# Patient Record
Sex: MALE | Race: WHITE | NOT HISPANIC OR LATINO | Employment: OTHER | URBAN - NONMETROPOLITAN AREA
[De-identification: names, ages, dates, MRNs, and addresses within clinical notes are randomized per-mention and may not be internally consistent; named-entity substitution may affect disease eponyms.]

---

## 2020-09-28 ENCOUNTER — HOSPITAL ENCOUNTER (EMERGENCY)
Facility: OTHER | Age: 70
Discharge: HOME | End: 2020-09-28
Attending: PHYSICIAN ASSISTANT | Admitting: PHYSICIAN ASSISTANT
Payer: COMMERCIAL

## 2020-09-28 ENCOUNTER — APPOINTMENT (OUTPATIENT)
Dept: GENERAL RADIOLOGY | Facility: OTHER | Age: 70
End: 2020-09-28
Attending: PHYSICIAN ASSISTANT
Payer: COMMERCIAL

## 2020-09-28 VITALS
DIASTOLIC BLOOD PRESSURE: 83 MMHG | HEART RATE: 72 BPM | RESPIRATION RATE: 15 BRPM | TEMPERATURE: 98.5 F | BODY MASS INDEX: 21.36 KG/M2 | SYSTOLIC BLOOD PRESSURE: 126 MMHG | WEIGHT: 175.4 LBS | HEIGHT: 76 IN | OXYGEN SATURATION: 97 %

## 2020-09-28 DIAGNOSIS — K92.1 MELENA: ICD-10-CM

## 2020-09-28 DIAGNOSIS — N39.0 URINARY TRACT INFECTION WITH PYURIA: ICD-10-CM

## 2020-09-28 DIAGNOSIS — K64.4 RESIDUAL HEMORRHOIDAL SKIN TAGS: ICD-10-CM

## 2020-09-28 DIAGNOSIS — K61.1 PERIRECTAL ABSCESS: ICD-10-CM

## 2020-09-28 LAB
ABO + RH BLD: NORMAL
ABO + RH BLD: NORMAL
ALBUMIN SERPL-MCNC: 4.3 G/DL (ref 3.5–5.7)
ALBUMIN UR-MCNC: 10 MG/DL
ALP SERPL-CCNC: 61 U/L (ref 34–104)
ALT SERPL W P-5'-P-CCNC: 10 U/L (ref 7–52)
ANION GAP SERPL CALCULATED.3IONS-SCNC: 8 MMOL/L (ref 3–14)
APPEARANCE UR: ABNORMAL
AST SERPL W P-5'-P-CCNC: 17 U/L (ref 13–39)
BASOPHILS # BLD AUTO: 0.1 10E9/L (ref 0–0.2)
BASOPHILS NFR BLD AUTO: 0.7 %
BILIRUB SERPL-MCNC: 0.7 MG/DL (ref 0.3–1)
BILIRUB UR QL STRIP: NEGATIVE
BLD GP AB SCN SERPL QL: NORMAL
BLOOD BANK CMNT PATIENT-IMP: NORMAL
BUN SERPL-MCNC: 19 MG/DL (ref 7–25)
CALCIUM SERPL-MCNC: 10.3 MG/DL (ref 8.6–10.3)
CHLORIDE SERPL-SCNC: 100 MMOL/L (ref 98–107)
CO2 SERPL-SCNC: 27 MMOL/L (ref 21–31)
COLOR UR AUTO: YELLOW
CREAT SERPL-MCNC: 0.99 MG/DL (ref 0.7–1.3)
DIFFERENTIAL METHOD BLD: NORMAL
EOSINOPHIL # BLD AUTO: 0.2 10E9/L (ref 0–0.7)
EOSINOPHIL NFR BLD AUTO: 2.7 %
ERYTHROCYTE [DISTWIDTH] IN BLOOD BY AUTOMATED COUNT: 12.2 % (ref 10–15)
GFR SERPL CREATININE-BSD FRML MDRD: 75 ML/MIN/{1.73_M2}
GLUCOSE SERPL-MCNC: 111 MG/DL (ref 70–105)
GLUCOSE UR STRIP-MCNC: NEGATIVE MG/DL
HCT VFR BLD AUTO: 46.8 % (ref 40–53)
HEMOCCULT STL QL: NEGATIVE
HGB BLD-MCNC: 15.9 G/DL (ref 13.3–17.7)
HGB UR QL STRIP: ABNORMAL
IMM GRANULOCYTES # BLD: 0 10E9/L (ref 0–0.4)
IMM GRANULOCYTES NFR BLD: 0.3 %
INR PPP: 1 (ref 0–1.3)
INTERPRETATION ECG - MUSE: NORMAL
KETONES UR STRIP-MCNC: NEGATIVE MG/DL
LEUKOCYTE ESTERASE UR QL STRIP: ABNORMAL
LYMPHOCYTES # BLD AUTO: 2.1 10E9/L (ref 0.8–5.3)
LYMPHOCYTES NFR BLD AUTO: 29 %
MCH RBC QN AUTO: 31.2 PG (ref 26.5–33)
MCHC RBC AUTO-ENTMCNC: 34 G/DL (ref 31.5–36.5)
MCV RBC AUTO: 92 FL (ref 78–100)
MONOCYTES # BLD AUTO: 0.6 10E9/L (ref 0–1.3)
MONOCYTES NFR BLD AUTO: 8.3 %
MUCOUS THREADS #/AREA URNS LPF: PRESENT /LPF
NEUTROPHILS # BLD AUTO: 4.2 10E9/L (ref 1.6–8.3)
NEUTROPHILS NFR BLD AUTO: 59 %
NITRATE UR QL: NEGATIVE
PH UR STRIP: 6.5 PH (ref 5–7)
PLATELET # BLD AUTO: 230 10E9/L (ref 150–450)
POTASSIUM SERPL-SCNC: 3.9 MMOL/L (ref 3.5–5.1)
PROT SERPL-MCNC: 7.6 G/DL (ref 6.4–8.9)
RBC # BLD AUTO: 5.09 10E12/L (ref 4.4–5.9)
RBC #/AREA URNS AUTO: 33 /HPF (ref 0–2)
SODIUM SERPL-SCNC: 135 MMOL/L (ref 134–144)
SOURCE: ABNORMAL
SP GR UR STRIP: 1.01 (ref 1–1.03)
SPECIMEN EXP DATE BLD: NORMAL
UROBILINOGEN UR STRIP-MCNC: NORMAL MG/DL (ref 0–2)
WBC # BLD AUTO: 7.1 10E9/L (ref 4–11)
WBC #/AREA URNS AUTO: >182 /HPF (ref 0–5)
WBC CLUMPS #/AREA URNS HPF: PRESENT /HPF
YEAST #/AREA URNS HPF: ABNORMAL /HPF

## 2020-09-28 PROCEDURE — 36415 COLL VENOUS BLD VENIPUNCTURE: CPT | Performed by: PHYSICIAN ASSISTANT

## 2020-09-28 PROCEDURE — 25000128 H RX IP 250 OP 636: Performed by: PHYSICIAN ASSISTANT

## 2020-09-28 PROCEDURE — 86850 RBC ANTIBODY SCREEN: CPT | Performed by: PHYSICIAN ASSISTANT

## 2020-09-28 PROCEDURE — 80053 COMPREHEN METABOLIC PANEL: CPT | Performed by: PHYSICIAN ASSISTANT

## 2020-09-28 PROCEDURE — 81001 URINALYSIS AUTO W/SCOPE: CPT | Performed by: PHYSICIAN ASSISTANT

## 2020-09-28 PROCEDURE — 25000132 ZZH RX MED GY IP 250 OP 250 PS 637: Performed by: PHYSICIAN ASSISTANT

## 2020-09-28 PROCEDURE — 99283 EMERGENCY DEPT VISIT LOW MDM: CPT | Mod: Z6 | Performed by: PHYSICIAN ASSISTANT

## 2020-09-28 PROCEDURE — 85610 PROTHROMBIN TIME: CPT | Performed by: PHYSICIAN ASSISTANT

## 2020-09-28 PROCEDURE — 93005 ELECTROCARDIOGRAM TRACING: CPT | Performed by: PHYSICIAN ASSISTANT

## 2020-09-28 PROCEDURE — 93010 ELECTROCARDIOGRAM REPORT: CPT | Performed by: INTERNAL MEDICINE

## 2020-09-28 PROCEDURE — 86901 BLOOD TYPING SEROLOGIC RH(D): CPT | Performed by: PHYSICIAN ASSISTANT

## 2020-09-28 PROCEDURE — 25800030 ZZH RX IP 258 OP 636: Performed by: PHYSICIAN ASSISTANT

## 2020-09-28 PROCEDURE — 85025 COMPLETE CBC W/AUTO DIFF WBC: CPT | Performed by: PHYSICIAN ASSISTANT

## 2020-09-28 PROCEDURE — 74019 RADEX ABDOMEN 2 VIEWS: CPT

## 2020-09-28 PROCEDURE — 99285 EMERGENCY DEPT VISIT HI MDM: CPT | Mod: 25 | Performed by: PHYSICIAN ASSISTANT

## 2020-09-28 PROCEDURE — 87086 URINE CULTURE/COLONY COUNT: CPT | Performed by: PHYSICIAN ASSISTANT

## 2020-09-28 PROCEDURE — C9113 INJ PANTOPRAZOLE SODIUM, VIA: HCPCS | Performed by: PHYSICIAN ASSISTANT

## 2020-09-28 PROCEDURE — 82272 OCCULT BLD FECES 1-3 TESTS: CPT | Performed by: PHYSICIAN ASSISTANT

## 2020-09-28 PROCEDURE — 96365 THER/PROPH/DIAG IV INF INIT: CPT | Performed by: PHYSICIAN ASSISTANT

## 2020-09-28 PROCEDURE — 86900 BLOOD TYPING SEROLOGIC ABO: CPT | Performed by: PHYSICIAN ASSISTANT

## 2020-09-28 RX ORDER — TAMSULOSIN HYDROCHLORIDE 0.4 MG/1
0.4 CAPSULE ORAL DAILY
COMMUNITY
End: 2021-12-14

## 2020-09-28 RX ORDER — FLUCONAZOLE 150 MG/1
150 TABLET ORAL ONCE
Status: COMPLETED | OUTPATIENT
Start: 2020-09-28 | End: 2020-09-28

## 2020-09-28 RX ORDER — FERROUS SULFATE 325(65) MG
325 TABLET ORAL
COMMUNITY
End: 2020-11-05

## 2020-09-28 RX ORDER — SULFAMETHOXAZOLE/TRIMETHOPRIM 800-160 MG
1 TABLET ORAL 2 TIMES DAILY
Qty: 20 TABLET | Refills: 0 | Status: SHIPPED | OUTPATIENT
Start: 2020-09-28 | End: 2020-10-08

## 2020-09-28 RX ORDER — SODIUM CHLORIDE 9 MG/ML
INJECTION, SOLUTION INTRAVENOUS CONTINUOUS
Status: DISCONTINUED | OUTPATIENT
Start: 2020-09-28 | End: 2020-09-28 | Stop reason: HOSPADM

## 2020-09-28 RX ORDER — FINASTERIDE 1 MG/1
5 TABLET, FILM COATED ORAL DAILY
COMMUNITY
End: 2020-11-05

## 2020-09-28 RX ORDER — SULFAMETHOXAZOLE/TRIMETHOPRIM 800-160 MG
1 TABLET ORAL ONCE
Status: COMPLETED | OUTPATIENT
Start: 2020-09-28 | End: 2020-09-28

## 2020-09-28 RX ADMIN — PANTOPRAZOLE SODIUM 80 MG: 40 INJECTION, POWDER, FOR SOLUTION INTRAVENOUS at 11:51

## 2020-09-28 RX ADMIN — SULFAMETHOXAZOLE AND TRIMETHOPRIM 1 TABLET: 800; 160 TABLET ORAL at 12:51

## 2020-09-28 RX ADMIN — SODIUM CHLORIDE 1000 ML: 9 INJECTION, SOLUTION INTRAVENOUS at 11:45

## 2020-09-28 RX ADMIN — FLUCONAZOLE 150 MG: 150 TABLET ORAL at 12:51

## 2020-09-28 ASSESSMENT — ENCOUNTER SYMPTOMS
NAUSEA: 0
SORE THROAT: 0
DIZZINESS: 0
SEIZURES: 0
CHEST TIGHTNESS: 0
VOMITING: 0
LIGHT-HEADEDNESS: 0
ACTIVITY CHANGE: 0
FACIAL SWELLING: 0
APPETITE CHANGE: 0
SINUS PRESSURE: 0
ABDOMINAL PAIN: 0
VOICE CHANGE: 0
FATIGUE: 0
BACK PAIN: 0
HEADACHES: 0
FEVER: 0
CHILLS: 0
WOUND: 0
ADENOPATHY: 0
WEAKNESS: 0
NECK PAIN: 0
COUGH: 0
TROUBLE SWALLOWING: 0
FREQUENCY: 0
HEMATURIA: 0
DIARRHEA: 0
CONFUSION: 0
SHORTNESS OF BREATH: 0
BRUISES/BLEEDS EASILY: 0
DYSURIA: 0
EYE PAIN: 0

## 2020-09-28 ASSESSMENT — MIFFLIN-ST. JEOR: SCORE: 1662.11

## 2020-09-28 NOTE — ED AVS SNAPSHOT
[facility]  [address]  [place] [address]  Phone:  [phone]  Fax:  [phone]                                    Avinash Wray   MRN: [id number]    Department:  [facility]   Date of Visit:  9/28/2020           After Visit Summary Signature Page    I have received my discharge instructions, and my questions have been answered. I have discussed any challenges I see with this plan with the nurse or doctor.    ..........................................................................................................................................  Patient/Patient Representative Signature      ..........................................................................................................................................  Patient Representative Print Name and Relationship to Patient    ..................................................               ................................................  Date                                   Time    ..........................................................................................................................................  Reviewed by Signature/Title    ...................................................              ..............................................  Date                                               Time          22EPIC Rev 08/18

## 2020-09-28 NOTE — ED PROVIDER NOTES
History     Chief Complaint   Patient presents with     Melena     HPI  Avinash Wray is a 70 year old male who [facility] is [facility].  He reports having a history of back pain that has caused him to take an increased amount of Tylenol and ibuprofen.  About a week ago he developed some black tarry stools.  He also noted to have some perirectal boils.  He reports one is in the groin at this time.  Denies any recent lightheadedness but to 1/2 years ago had an episode of this.  No nausea or vomiting.  Denies any dizziness or vertigo.  Denies any fever or chills.  No abdominal pain.  No diarrhea or constipation.  Denies any fatigue or weakness.  No shortness of breath or chest pain.    Allergies:  No Known Allergies    Problem List:    There are no active problems to display for this patient.       Past Medical History:    History reviewed. No pertinent past medical history.    Past Surgical History:    History reviewed. No pertinent surgical history.    Family History:    History reviewed. No pertinent family history.    Social History:  Marital Status:   [4]  Social History     Tobacco Use     Smoking status: Current Every Day Smoker     Packs/day: 1.00     Smokeless tobacco: Never Used   Substance Use Topics     Alcohol use: Not Currently     Drug use: Not Currently        Medications:    ferrous sulfate (FEROSUL) 325 (65 Fe) MG tablet  finasteride (PROPECIA) 1 MG tablet  Magnesium Oxide 250 MG TABS  tamsulosin (FLOMAX) 0.4 MG capsule          Review of Systems   Constitutional: Negative for activity change, appetite change, chills, fatigue and fever.   HENT: Negative for congestion, facial swelling, sinus pressure, sore throat, trouble swallowing and voice change.    Eyes: Negative for pain and visual disturbance.   Respiratory: Negative for cough, chest tightness and shortness of breath.    Cardiovascular: Negative for chest pain.   Gastrointestinal: Negative for abdominal pain, diarrhea, nausea and  "vomiting.        Dark tarry stools   Genitourinary: Negative for dysuria, frequency, hematuria and urgency.   Musculoskeletal: Negative for back pain and neck pain.   Skin: Negative for rash and wound.   Neurological: Negative for dizziness, seizures, syncope, weakness, light-headedness and headaches.   Hematological: Negative for adenopathy. Does not bruise/bleed easily.   Psychiatric/Behavioral: Negative for confusion.       Physical Exam   BP: 138/84  Pulse: 101  Temp: 98.5  F (36.9  C)  Resp: 15  Height: 193 cm (6' 4\")  Weight: 79.6 kg (175 lb 6.4 oz)  SpO2: 97 %      Physical Exam  Constitutional:       General: He is not in acute distress.     Appearance: He is not ill-appearing, toxic-appearing or diaphoretic.   HENT:      Head: No raccoon eyes or Miller's sign.      Jaw: No trismus.      Right Ear: No drainage or tenderness.      Left Ear: No drainage or tenderness.      Nose: Nose normal.   Eyes:      General: No scleral icterus.     Extraocular Movements: Extraocular movements intact.      Right eye: Normal extraocular motion and no nystagmus.      Left eye: Normal extraocular motion and no nystagmus.      Pupils: Pupils are equal, round, and reactive to light.      Right eye: Pupil is reactive and not sluggish.      Left eye: Pupil is reactive and not sluggish.      Funduscopic exam:     Right eye: No AV nicking, arteriolar narrowing or papilledema. Red reflex present.         Left eye: No AV nicking, arteriolar narrowing or papilledema. Red reflex present.  Neck:      Musculoskeletal: Normal range of motion. Normal range of motion. No neck rigidity, pain with movement, spinous process tenderness or muscular tenderness.      Vascular: No JVD.      Trachea: No tracheal deviation.   Cardiovascular:      Rate and Rhythm: Normal rate and regular rhythm.   Pulmonary:      Effort: Pulmonary effort is normal. No respiratory distress.      Breath sounds: Normal breath sounds. No stridor. No wheezing.   Abdominal: "      General: There is no distension.      Palpations: There is no mass.      Tenderness: There is no abdominal tenderness. There is no right CVA tenderness, left CVA tenderness, guarding or rebound.      Comments: Abdomen is soft nontender no rebound or masses.  Bowel sounds are normal   Genitourinary:     Comments: He has a small perirectal abscess noted as well as some deflated perirectal hemorrhoids.  Guaiac was obtained  Musculoskeletal: Normal range of motion.         General: No tenderness or deformity.   Lymphadenopathy:      Cervical: No cervical adenopathy.      Right cervical: No superficial cervical adenopathy.     Left cervical: No superficial cervical adenopathy.   Skin:     General: Skin is warm and dry.      Capillary Refill: Capillary refill takes less than 2 seconds.   Neurological:      Mental Status: He is alert and oriented to person, place, and time.      GCS: GCS eye subscore is 4. GCS verbal subscore is 5. GCS motor subscore is 6.      Motor: No tremor or seizure activity.      Coordination: Coordination normal.      Gait: Gait normal.   Psychiatric:         Mood and Affect: Mood normal.         Behavior: Behavior normal.         Thought Content: Thought content normal.         Judgment: Judgment normal.         ED Course     EKG shows normal sinus rhythm.  Septal infarct, age undetermined.  Heart rate is 76.    Results for orders placed or performed during the hospital encounter of 09/28/20 (from the past 24 hour(s))   CBC with platelets differential   Result Value Ref Range    WBC 7.1 4.0 - 11.0 10e9/L    RBC Count 5.09 4.4 - 5.9 10e12/L    Hemoglobin 15.9 13.3 - 17.7 g/dL    Hematocrit 46.8 40.0 - 53.0 %    MCV 92 78 - 100 fl    MCH 31.2 26.5 - 33.0 pg    MCHC 34.0 31.5 - 36.5 g/dL    RDW 12.2 10.0 - 15.0 %    Platelet Count 230 150 - 450 10e9/L    Diff Method Automated Method     % Neutrophils 59.0 %    % Lymphocytes 29.0 %    % Monocytes 8.3 %    % Eosinophils 2.7 %    % Basophils 0.7 %     % Immature Granulocytes 0.3 %    Absolute Neutrophil 4.2 1.6 - 8.3 10e9/L    Absolute Lymphocytes 2.1 0.8 - 5.3 10e9/L    Absolute Monocytes 0.6 0.0 - 1.3 10e9/L    Absolute Eosinophils 0.2 0.0 - 0.7 10e9/L    Absolute Basophils 0.1 0.0 - 0.2 10e9/L    Abs Immature Granulocytes 0.0 0 - 0.4 10e9/L   Comprehensive metabolic panel   Result Value Ref Range    Sodium 135 134 - 144 mmol/L    Potassium 3.9 3.5 - 5.1 mmol/L    Chloride 100 98 - 107 mmol/L    Carbon Dioxide 27 21 - 31 mmol/L    Anion Gap 8 3 - 14 mmol/L    Glucose 111 (H) 70 - 105 mg/dL    Urea Nitrogen 19 7 - 25 mg/dL    Creatinine 0.99 0.70 - 1.30 mg/dL    GFR Estimate 75 >60 mL/min/[1.73_m2]    GFR Estimate If Black >90 >60 mL/min/[1.73_m2]    Calcium 10.3 8.6 - 10.3 mg/dL    Bilirubin Total 0.7 0.3 - 1.0 mg/dL    Albumin 4.3 3.5 - 5.7 g/dL    Protein Total 7.6 6.4 - 8.9 g/dL    Alkaline Phosphatase 61 34 - 104 U/L    ALT 10 7 - 52 U/L    AST 17 13 - 39 U/L   INR   Result Value Ref Range    INR 1.00 0 - 1.3   ABO/Rh type and screen   Result Value Ref Range    ABO O     RH(D) Neg     Antibody Screen Neg     Test Valid Only At [facility]        Specimen Expires 10/01/2020    UA reflex to Microscopic   Result Value Ref Range    Color Urine Yellow     Appearance Urine Cloudy     Glucose Urine Negative NEG^Negative mg/dL    Bilirubin Urine Negative NEG^Negative    Ketones Urine Negative NEG^Negative mg/dL    Specific Gravity Urine 1.015 1.003 - 1.035    Blood Urine Small (A) NEG^Negative    pH Urine 6.5 5.0 - 7.0 pH    Protein Albumin Urine 10 (A) NEG^Negative mg/dL    Urobilinogen mg/dL Normal 0.0 - 2.0 mg/dL    Nitrite Urine Negative NEG^Negative    Leukocyte Esterase Urine Large (A) NEG^Negative    Source Midstream Urine     RBC Urine 33 (H) 0 - 2 /HPF    WBC Urine >182 (H) 0 - 5 /HPF    WBC Clumps Present (A) NEG^Negative /HPF    Yeast Urine Many (A) NEG^Negative /HPF    Mucous Urine Present (A) NEG^Negative /LPF   Occult blood  stool   Result Value Ref Range    Occult Blood Negative NEG^Negative   XR Abdomen 2 Views    Narrative    PROCEDURE: XR ABDOMEN 2 VW 9/28/2020 12:36 PM    HISTORY: melena    COMPARISONS: None.    TECHNIQUE: Flat and upright    FINDINGS: The intestinal gas pattern is normal. There is no  extraluminal gas or pathologic intra-abdominal calcifications. Lumbar  scoliosis and degenerative changes are noted.         Impression    IMPRESSION: Normal abdominal gas pattern    MARIANA SUMMERS MD       Medications   0.9% sodium chloride BOLUS (0 mLs Intravenous Stopped 9/28/20 1300)     Followed by   sodium chloride 0.9% infusion (has no administration in time range)   pantoprazole (PROTONIX) 80 mg in sodium chloride 0.9 % 100 mL intermittent infusion (0 mg Intravenous Stopped 9/28/20 1201)   fluconazole (DIFLUCAN) tablet 150 mg (150 mg Oral Given 9/28/20 1251)   sulfamethoxazole-trimethoprim (BACTRIM DS) 800-160 MG per tablet 1 tablet (1 tablet Oral Given 9/28/20 1251)       Assessments & Plan (with Medical Decision Making)     I have reviewed the nursing notes.    I have reviewed the findings, diagnosis, plan and need for follow up with the patient.      Discharge Medication List as of 9/28/2020  1:01 PM      START taking these medications    Details   psyllium (METAMUCIL/KONSYL) 58.6 % powder Mix 1  tablespoonful in 8 ounces of water and take p.o. twice daily, Disp-1 Bottle,R-2, Local Print      sulfamethoxazole-trimethoprim (BACTRIM DS) 800-160 MG tablet Take 1 tablet by mouth 2 times daily for 10 days, Disp-20 tablet,R-0, Local Print             Final diagnoses:   Melena   Urinary tract infection with pyuria   Residual hemorrhoidal skin tags   Perirectal abscess     Afebrile.  Vital signs stable.  Patient complained of black tarry stools.  IV established and he was given fluids and Protonix initially.  He was typed and screened.  Guaiac however returns negative.  CBC shows normal white blood cells and no left shift.  His  hemoglobin is 15.9.  Orthostatics are unremarkable.  CMP is normal.  His UA returns with a large amount of leukocyte Estrace and greater than 182 white blood cells with yeast in bacteria.  UC is pending.  He was given an oral Bactrim as well as Diflucan in the ER.  Patient had some skin tags from hemorrhoids as well as a small perirectal abscess.  I discussed close follow-up with his primary care at the [facility] for further evaluation within a week.  Follow-up sooner if there is any other concerns problems or questions.  He is overdue for colonoscopy as well.  9/28/2020   [facility]     Vinny Auguste PA-C  09/28/20 [id number]

## 2020-09-30 LAB
BACTERIA SPEC CULT: NO GROWTH
SPECIMEN SOURCE: NORMAL

## 2020-09-30 NOTE — RESULT ENCOUNTER NOTE
"Final urine culture report shows \"NO GROWTH\" and is NEGATIVE.  Emergency Dept discharge antibiotic: Sulfamethoxazole-Trimethoprim (Bactrim DS, Septra DS) 800-160 mg PO tablet,  1 tablet by mouth 2 times daily for 10 days.  Is ED discharge Rx antibiotic for UTI only (Yes/No): Perirectal abscess as well as UTI  Recommendations per [place] ED Lab result protocol - Urine culture protocol."

## 2020-10-27 ENCOUNTER — TELEPHONE (OUTPATIENT)
Dept: SURGERY | Facility: OTHER | Age: 70
End: 2020-10-27

## 2020-10-27 DIAGNOSIS — Z12.11 ENCOUNTER FOR SCREENING COLONOSCOPY: Primary | ICD-10-CM

## 2020-10-27 RX ORDER — POLYETHYLENE GLYCOL 3350, SODIUM CHLORIDE, SODIUM BICARBONATE, POTASSIUM CHLORIDE 420; 11.2; 5.72; 1.48 G/4L; G/4L; G/4L; G/4L
4000 POWDER, FOR SOLUTION ORAL ONCE
Qty: 4000 ML | Refills: 0 | Status: SHIPPED | OUTPATIENT
Start: 2020-10-27 | End: 2020-10-27

## 2020-10-27 RX ORDER — BISACODYL 5 MG/1
TABLET, DELAYED RELEASE ORAL
Qty: 2 TABLET | Refills: 0 | Status: ON HOLD | OUTPATIENT
Start: 2020-10-27 | End: 2020-11-12

## 2020-10-27 NOTE — TELEPHONE ENCOUNTER
[facility] ,  Diagnosis is black stool.  Notes have been sent. Please advise.  Thank you. Shannon Lynn on 10/27/2020 at 9:49 AM

## 2020-10-27 NOTE — TELEPHONE ENCOUNTER
Screening Questions for the Scheduling of Screening Colonoscopies   (If Colonoscopy is diagnostic, Provider should review the chart before scheduling.)  Are you younger than 50 or older than 80?   NO   Do you take aspirin or fish oil?  NO  (if yes, tell patient to stop 1 week prior to Colonoscopy)  Do you take warfarin (Coumadin), clopidogrel (Plavix), apixaban (Eliquis), dabigatram (Pradaxa), rivaroxaban (Xarelto) or any blood thinner? NO   Do you use oxygen at home?  NO   Do you have kidney disease? NO   Are you on dialysis? NO   Have you had a stroke or heart attack in the last year? NO   Have you had a stent in your heart or any blood vessel in the last year? NO   Have you had a transplant of any organ? NO   Have you had a colonoscopy or upper endoscopy (EGD) before? YES          When?  7 YRS AGO   Date of scheduled Colonoscopy/ EGD  11/12/2020  Provider [facility]   Pharm[facility]

## 2020-11-05 RX ORDER — FINASTERIDE 5 MG/1
5 TABLET, FILM COATED ORAL
COMMUNITY
End: 2021-12-14

## 2020-11-08 ENCOUNTER — ALLIED HEALTH/NURSE VISIT (OUTPATIENT)
Dept: FAMILY MEDICINE | Facility: OTHER | Age: 70
End: 2020-11-08
Attending: SURGERY
Payer: MEDICARE

## 2020-11-08 DIAGNOSIS — Z12.11 ENCOUNTER FOR SCREENING COLONOSCOPY: ICD-10-CM

## 2020-11-08 PROCEDURE — 99207 PR NO CHARGE NURSE ONLY: CPT

## 2020-11-08 PROCEDURE — U0003 INFECTIOUS AGENT DETECTION BY NUCLEIC ACID (DNA OR RNA); SEVERE ACUTE RESPIRATORY SYNDROME CORONAVIRUS 2 (SARS-COV-2) (CORONAVIRUS DISEASE [COVID-19]), AMPLIFIED PROBE TECHNIQUE, MAKING USE OF HIGH THROUGHPUT TECHNOLOGIES AS DESCRIBED BY CMS-2020-01-R: HCPCS | Mod: ZL | Performed by: SURGERY

## 2020-11-08 PROCEDURE — C9803 HOPD COVID-19 SPEC COLLECT: HCPCS

## 2020-11-09 LAB
SARS-COV-2 RNA SPEC QL NAA+PROBE: NOT DETECTED
SPECIMEN SOURCE: NORMAL

## 2020-11-12 ENCOUNTER — HOSPITAL ENCOUNTER (OUTPATIENT)
Facility: OTHER | Age: 70
Discharge: HOME | End: 2020-11-12
Attending: SURGERY | Admitting: SURGERY
Payer: MEDICARE

## 2020-11-12 ENCOUNTER — ANESTHESIA EVENT (OUTPATIENT)
Dept: SURGERY | Facility: OTHER | Age: 70
End: 2020-11-12
Payer: MEDICARE

## 2020-11-12 ENCOUNTER — ANESTHESIA (OUTPATIENT)
Dept: SURGERY | Facility: OTHER | Age: 70
End: 2020-11-12
Payer: MEDICARE

## 2020-11-12 VITALS
RESPIRATION RATE: 14 BRPM | SYSTOLIC BLOOD PRESSURE: 108 MMHG | TEMPERATURE: 97.5 F | WEIGHT: 175 LBS | BODY MASS INDEX: 21.3 KG/M2 | DIASTOLIC BLOOD PRESSURE: 75 MMHG | HEART RATE: 72 BPM | OXYGEN SATURATION: 99 %

## 2020-11-12 PROCEDURE — 45385 COLONOSCOPY W/LESION REMOVAL: CPT | Mod: PT | Performed by: SURGERY

## 2020-11-12 PROCEDURE — 250N000011 HC RX IP 250 OP 636: Performed by: NURSE ANESTHETIST, CERTIFIED REGISTERED

## 2020-11-12 PROCEDURE — 88305 TISSUE EXAM BY PATHOLOGIST: CPT

## 2020-11-12 PROCEDURE — 258N000003 HC RX IP 258 OP 636: Performed by: SURGERY

## 2020-11-12 PROCEDURE — 250N000009 HC RX 250: Performed by: SURGERY

## 2020-11-12 PROCEDURE — 999N000010 HC STATISTIC ANES STAT CODE-CRNA PER MINUTE: Performed by: SURGERY

## 2020-11-12 PROCEDURE — 45385 COLONOSCOPY W/LESION REMOVAL: CPT | Performed by: NURSE ANESTHETIST, CERTIFIED REGISTERED

## 2020-11-12 PROCEDURE — 45380 COLONOSCOPY AND BIOPSY: CPT | Performed by: SURGERY

## 2020-11-12 PROCEDURE — 250N000009 HC RX 250: Performed by: NURSE ANESTHETIST, CERTIFIED REGISTERED

## 2020-11-12 PROCEDURE — 43239 EGD BIOPSY SINGLE/MULTIPLE: CPT | Performed by: SURGERY

## 2020-11-12 RX ORDER — PROPOFOL 10 MG/ML
INJECTION, EMULSION INTRAVENOUS CONTINUOUS PRN
Status: DISCONTINUED | OUTPATIENT
Start: 2020-11-12 | End: 2020-11-12

## 2020-11-12 RX ORDER — PROCHLORPERAZINE MALEATE 5 MG
5 TABLET ORAL EVERY 6 HOURS PRN
Status: DISCONTINUED | OUTPATIENT
Start: 2020-11-12 | End: 2020-11-12 | Stop reason: HOSPADM

## 2020-11-12 RX ORDER — ONDANSETRON 2 MG/ML
4 INJECTION INTRAMUSCULAR; INTRAVENOUS
Status: DISCONTINUED | OUTPATIENT
Start: 2020-11-12 | End: 2020-11-12 | Stop reason: HOSPADM

## 2020-11-12 RX ORDER — LIDOCAINE HYDROCHLORIDE 20 MG/ML
INJECTION, SOLUTION INFILTRATION; PERINEURAL PRN
Status: DISCONTINUED | OUTPATIENT
Start: 2020-11-12 | End: 2020-11-12

## 2020-11-12 RX ORDER — SODIUM CHLORIDE, SODIUM LACTATE, POTASSIUM CHLORIDE, CALCIUM CHLORIDE 600; 310; 30; 20 MG/100ML; MG/100ML; MG/100ML; MG/100ML
INJECTION, SOLUTION INTRAVENOUS CONTINUOUS
Status: DISCONTINUED | OUTPATIENT
Start: 2020-11-12 | End: 2020-11-12 | Stop reason: HOSPADM

## 2020-11-12 RX ORDER — ONDANSETRON 2 MG/ML
4 INJECTION INTRAMUSCULAR; INTRAVENOUS EVERY 6 HOURS PRN
Status: DISCONTINUED | OUTPATIENT
Start: 2020-11-12 | End: 2020-11-12 | Stop reason: HOSPADM

## 2020-11-12 RX ORDER — LIDOCAINE 40 MG/G
CREAM TOPICAL
Status: DISCONTINUED | OUTPATIENT
Start: 2020-11-12 | End: 2020-11-12 | Stop reason: HOSPADM

## 2020-11-12 RX ORDER — ONDANSETRON 4 MG/1
4 TABLET, ORALLY DISINTEGRATING ORAL EVERY 6 HOURS PRN
Status: DISCONTINUED | OUTPATIENT
Start: 2020-11-12 | End: 2020-11-12 | Stop reason: HOSPADM

## 2020-11-12 RX ORDER — PROPOFOL 10 MG/ML
INJECTION, EMULSION INTRAVENOUS PRN
Status: DISCONTINUED | OUTPATIENT
Start: 2020-11-12 | End: 2020-11-12

## 2020-11-12 RX ORDER — NALOXONE HYDROCHLORIDE 0.4 MG/ML
.1-.4 INJECTION, SOLUTION INTRAMUSCULAR; INTRAVENOUS; SUBCUTANEOUS
Status: DISCONTINUED | OUTPATIENT
Start: 2020-11-12 | End: 2020-11-12 | Stop reason: HOSPADM

## 2020-11-12 RX ORDER — FLUMAZENIL 0.1 MG/ML
0.2 INJECTION, SOLUTION INTRAVENOUS
Status: DISCONTINUED | OUTPATIENT
Start: 2020-11-12 | End: 2020-11-12 | Stop reason: HOSPADM

## 2020-11-12 RX ADMIN — PROPOFOL 140 MCG/KG/MIN: 10 INJECTION, EMULSION INTRAVENOUS at 07:33

## 2020-11-12 RX ADMIN — SODIUM CHLORIDE, POTASSIUM CHLORIDE, SODIUM LACTATE AND CALCIUM CHLORIDE: 600; 310; 30; 20 INJECTION, SOLUTION INTRAVENOUS at 08:19

## 2020-11-12 RX ADMIN — PROPOFOL 40 MG: 10 INJECTION, EMULSION INTRAVENOUS at 07:33

## 2020-11-12 RX ADMIN — LIDOCAINE HYDROCHLORIDE 40 MG: 20 INJECTION, SOLUTION INFILTRATION; PERINEURAL at 07:33

## 2020-11-12 RX ADMIN — SODIUM CHLORIDE, POTASSIUM CHLORIDE, SODIUM LACTATE AND CALCIUM CHLORIDE: 600; 310; 30; 20 INJECTION, SOLUTION INTRAVENOUS at 07:07

## 2020-11-12 RX ADMIN — PROPOFOL 40 MG: 10 INJECTION, EMULSION INTRAVENOUS at 07:35

## 2020-11-12 ASSESSMENT — LIFESTYLE VARIABLES: TOBACCO_USE: 1

## 2020-11-12 NOTE — H&P
CHIEF COMPLAINT / REASON FOR PROCEDURE: Black stools, screening    PERTINENT HISTORY   Patient complains of black stools. Previous EGD - None.     History reviewed. No pertinent past medical history.    History reviewed. No pertinent surgical history.    Other:  None  Bleeding tendencies:  None      Relevant Family History: None     Relevant Social History:  None     10 point ROS of systems including Constitutional, Eyes, Respiratory, Cardiovascular, Gastroenterology, Genitourinary, Integumentary, Muscularskeletal, Psychiatric were all negative except for pertinent positives noted in my HPI.      ALLERGIES/SENSITIVITIES: No Known Allergies     CURRENT MEDICATIONS:  No current facility-administered medications on file prior to encounter.        finasteride (PROSCAR) 5 MG tablet, Take 5 mg by mouth       psyllium (METAMUCIL/KONSYL) 58.6 % powder, Mix 1  tablespoonful in 8 ounces of water and take p.o. twice daily       tamsulosin (FLOMAX) 0.4 MG capsule, Take 0.4 mg by mouth daily          PRE-SEDATION ASSESSMENT:    LUNGS:  CTA B/L, no wheezing or crackles.  Heart & CV:  RRR no murmur.  Intact distal pulses, good cap refill.    Comment(s):      IMPRESSION:  70 year old male in need of EGD to evaluate for bleeding and colonoscopy for screening.    PLAN:  I discussed diagnostic EGD/ screening colonoscopy with the patient. Anesthesia requested    Pj Person MD

## 2020-11-12 NOTE — OR NURSING
Discharge instructions given to patient and patient's spouse. No questions. W/C out of unit. Denies pain, nausea or dizziness

## 2020-11-12 NOTE — ANESTHESIA POSTPROCEDURE EVALUATION
Patient: Avinash Wray    Procedure(s):  ESOPHAGOGASTRODUODENOSCOPY, WITH BIOPSY  COLONOSCOPY, WITH POLYPECTOMY    Diagnosis:Black stools [K92.1]  Diagnosis Additional Information: No value filed.    Anesthesia Type:  MAC    Note:  Anesthesia Post Evaluation    Patient location during evaluation: Phase 2  Patient participation: Able to fully participate in evaluation  Level of consciousness: awake and alert  Pain management: adequate  Airway patency: patent  Cardiovascular status: acceptable  Respiratory status: acceptable  Hydration status: acceptable  PONV: none             Last vitals:  Vitals:    11/12/20 0845 11/12/20 0900 11/12/20 0906   BP: 112/71 (!) 96/33 108/75   Pulse: 69 70 72   Resp: 16 14 14   Temp:      SpO2: 97% 97% 99%         Electronically Signed By: TIMOTEO MORENO CRNA  November 12, 2020  10:58 AM

## 2020-11-12 NOTE — DISCHARGE INSTRUCTIONS
[facility]  Adult Discharge Orders & Instructions    ________________________________________________________________          For 12 hours after surgery  1. Get plenty of rest.  A responsible adult must stay with you for at least 12 hours after you leave the hospital.   2. You may feel lightheaded.  IF so, sit for a few minutes before standing.  Have someone help you get up.   3. You may have a slight fever. Call the doctor if your fever is over 101 F (38.3 C) (taken under the tongue) or lasts longer than 24 hours.  4. You may have a dry mouth, a sore throat, muscle aches or trouble sleeping.  These should go away after 24 hours.  5. Do not make important or legal decisions.  6.   Do not drive or use heavy equipment.  If you have weakness or tingling, don't drive or use heavy equipment until this feeling goes away.    To contact a doctor, call   [phone]_______________________

## 2020-11-12 NOTE — ANESTHESIA PREPROCEDURE EVALUATION
Anesthesia Pre-Procedure Evaluation    Patient: Avinash Wray   MRN: [id number] : 1950          Preoperative Diagnosis: Black stools [K92.1]    Procedure(s):  ESOPHAGOGASTRODUODENOSCOPY (EGD)  COLONOSCOPY    History reviewed. No pertinent past medical history.  History reviewed. No pertinent surgical history.    Anesthesia Evaluation     . Pt has had prior anesthetic.     No history of anesthetic complications          ROS/MED HX    ENT/Pulmonary: Comment: Hx [place] pleurodesis for recurrent pneumothorax     (+)tobacco use, Current use 1.5 packs/day  , . .    Neurologic: Comment: Unstable balance      Cardiovascular:  - neg cardiovascular ROS       METS/Exercise Tolerance:  >4 METS   Hematologic:  - neg hematologic  ROS       Musculoskeletal:   (+) arthritis,  -       GI/Hepatic:     (+) bowel prep,       Renal/Genitourinary:     (+) Other Renal/ Genitourinary, Hx bladder CA      Endo:  - neg endo ROS       Psychiatric:  - neg psychiatric ROS       Infectious Disease:  - neg infectious disease ROS       Malignancy:   (+) Malignancy History of Other  Other CA Remission status post Surgery Bladder CA        Other:    - neg other ROS                      Physical Exam  Normal systems: cardiovascular and pulmonary    Airway   Mallampati: II  TM distance: >3 FB  Neck ROM: full    Dental   (+) upper dentures and lower dentures    Cardiovascular   Rhythm and rate: regular and normal      Pulmonary (+) decreased breath sounds               Lab Results   Component Value Date    WBC 7.1 2020    HGB 15.9 2020    HCT 46.8 2020     2020     2020    POTASSIUM 3.9 2020    CHLORIDE 100 2020    CO2 27 2020    BUN 19 2020    CR 0.99 2020     (H) 2020    PRESTON 10.3 2020    ALBUMIN 4.3 2020    PROTTOTAL 7.6 2020    ALT 10 2020    AST 17 2020    ALKPHOS 61 2020    BILITOTAL 0.7 2020    INR 1.00 2020  "      Preop Vitals  BP Readings from Last 3 Encounters:   11/12/20 132/88   09/28/20 126/83    Pulse Readings from Last 3 Encounters:   09/28/20 72      Resp Readings from Last 3 Encounters:   11/12/20 18   09/28/20 15    SpO2 Readings from Last 3 Encounters:   11/12/20 97%   09/28/20 97%      Temp Readings from Last 1 Encounters:   11/12/20 97.5  F (36.4  C) (Tympanic)    Ht Readings from Last 1 Encounters:   09/28/20 1.93 m (6' 4\")      Wt Readings from Last 1 Encounters:   11/12/20 79.4 kg (175 lb)    Estimated body mass index is 21.3 kg/m  as calculated from the following:    Height as of 9/28/20: 1.93 m (6' 4\").    Weight as of this encounter: 79.4 kg (175 lb).       Anesthesia Plan      History & Physical Review      ASA Status:  2 .    NPO Status:  > 8 hours    Plan for MAC Reason for MAC:  Chronic cardiopulmonary disease (G9)           Postoperative Care      Consents  Anesthetic plan, risks, benefits and alternatives discussed with:  Patient.  Use of blood products discussed: No .   .                 TIMOTEO Garcia CRNA  "

## 2020-11-12 NOTE — OP NOTE
PROCEDURE NOTE    SURGEON:Pj Person MD    PRE-OP DIAGNOSIS:  Black stools, Screening      POST-OP DIAGNOSIS: Likely Reflux Esophagitis, Colon polyps    PROCEDURE PLANNED:   Diagnostic EGD      Screening Colonoscopy    PROCEDURE PERFORMED:  Flexible EGD with biopsies, Colonoscopy with cold snare.     SPECIMEN:      ID Type Source Tests Collected by Time Destination   A :  Biopsy Small Intestine, Duodenum SURGICAL PATHOLOGY EXAM Pj Person MD 11/12/2020  7:39 AM    B :  Biopsy Stomach, Antrum SURGICAL PATHOLOGY EXAM Pj Person MD 11/12/2020  7:40 AM    C :  Biopsy Esophagus, Distal SURGICAL PATHOLOGY EXAM Pj Person MD 11/12/2020  7:42 AM    D : polyps Polyp Large Intestine, Right/Ascending SURGICAL PATHOLOGY EXAM Pj Person MD 11/12/2020  7:56 AM    E :  Polyp Large Intestine, Transverse SURGICAL PATHOLOGY EXAM Pj Person MD 11/12/2020  8:03 AM    F : polyps Polyp Large Intestine, Sigmoid SURGICAL PATHOLOGY EXAM Pj Person MD 11/12/2020  8:16 AM          ANESTHESIA:  Monitor Anesthesia Care  No anesthesia staff entered.  Anesthesia coverage requested    ESTIMATED BLOOD LOSS: none    COMPLICATIONS:  None    INDICATION FOR THE PROCEDURE: The patient is a 70 year old male. The patient presents with need for screening and a hx of black stools. I explained to the patient the risks, benefits and alternatives to diagnostic EGD and colonoscopy for evaluating for GI bleeding and polyps. We specifically discussed the risks of bleeding, infection, perforation, potential inability to reach the cecum and the risks of sedation. The patient's questions were answered and the patient wished to proceed. Informed consent paperwork was completed.    PROCEDURE: The patient was taken to the endoscopy suite. Appropriate monitors were attached.  The patient was placed in the left lateral decubitus position. Bite block was positioned.Timeout was performed confirming the patient's identity and  procedure to be performed.  After appropriate sedation was confirmed, the flexible endoscope was advanced into the oropharynx. The posterior oropharynx appeared grossly normal. The scope was advanced into the proximal esophagus. The esophagus was insufflated with air. The scope was advanced under direct visualization. No acute abnormalities of the esophagus were noted. The scope was advanced into the stomach. The scope was advanced through the pylorus into the duodenal bulb. The bulb and distal duodenum appeared grossly normal. The scope was withdrawn back into the stomach. Antral biopsy was obtained and sent to pathology. The scope was retroflexed and the GE junction inspected. No abnormalities were noted. The scope was returned to a neutral position and the stomach was decompressed. The scope was withdrawnto the GE junction and biopsy obtained.  There was an area compatible with healing reflux esophagitis.  The mucosa of the esophagus was inspected while withdrawing the scope. No abnormalities were noted. The scope was withdrawn from the patient. The bite block was removed.    The patient was repositioned. After appropriate sedation, digital rectal exam was performed.  There was normal tone and no gross abnormality was noted.  The lubricated flexible colonoscope was introduced into the anus the colon was insufflated with air. The prep quality was adequate. Under direct visualization the scope was advanced to the cecum. The mucosa of colon was inspected while withdrawing the scope.  Four ascending colon polyps were removed with cold snare. (2-7mm)  3 transverse colon polyps were removed cold (5-9mm).  6 polyps were removed from the colon (2-7mm). The scope was retroflexed in the rectum and the anorectal junction was inspected. No abnormalities were noted. The scope was returned to a neutral position and the colon was decompressed. The scope was removed. The patient tolerated the procedure with no immediately  apparent complication. The patient was taken to recovery in stable condition.    FOLLOW UP:  RECOMMENDATIONS Continue PPI, Follow-up pathology.     Pj Person MD     CC: No Ref-Primary, Physician

## 2020-11-12 NOTE — ANESTHESIA CARE TRANSFER NOTE
Patient: Avinash Wray    Procedure(s):  ESOPHAGOGASTRODUODENOSCOPY, WITH BIOPSY  COLONOSCOPY, WITH POLYPECTOMY    Diagnosis: Black stools [K92.1]  Diagnosis Additional Information: No value filed.    Anesthesia Type:   MAC     Note:  Airway :Room Air  Patient transferred to:Phase II  Handoff Report: Identifed the Patient, Identified the Reponsible Provider, Reviewed the pertinent medical history, Discussed the surgical course, Reviewed Intra-OP anesthesia mangement and issues during anesthesia, Set expectations for post-procedure period and Allowed opportunity for questions and acknowledgement of understanding      Vitals: (Last set prior to Anesthesia Care Transfer)    CRNA VITALS  11/12/2020 0803 - 11/12/2020 0833      11/12/2020             Pulse:  72    Ht Rate:  72    SpO2:  97 %    Resp Rate (set):  10                Electronically Signed By: TIMOTEO MORENO CRNA  November 12, 2020  8:33 AM

## 2020-11-17 ENCOUNTER — TELEPHONE (OUTPATIENT)
Dept: SURGERY | Facility: OTHER | Age: 70
End: 2020-11-17

## 2020-11-17 DIAGNOSIS — K22.10 BARRETT'S ESOPHAGEAL ULCERATION: Primary | ICD-10-CM

## 2020-11-19 ENCOUNTER — TELEPHONE (OUTPATIENT)
Dept: SURGERY | Facility: OTHER | Age: 70
End: 2020-11-19

## 2020-11-19 DIAGNOSIS — K22.10 BARRETT'S ESOPHAGEAL ULCERATION: ICD-10-CM

## 2020-11-19 NOTE — TELEPHONE ENCOUNTER
Patient needs the medication Omeprazole 20 MG Oral Capsule Delayed Release [facility] Fax # [phone] ATTN: Vinny Valle. That way it gets covered

## 2020-12-27 ENCOUNTER — HEALTH MAINTENANCE LETTER (OUTPATIENT)
Age: 70
End: 2020-12-27

## 2021-02-25 ENCOUNTER — OFFICE VISIT (OUTPATIENT)
Dept: FAMILY MEDICINE | Facility: OTHER | Age: 71
End: 2021-02-25
Attending: NURSE PRACTITIONER
Payer: MEDICARE

## 2021-02-25 ENCOUNTER — HOSPITAL ENCOUNTER (OUTPATIENT)
Dept: GENERAL RADIOLOGY | Facility: OTHER | Age: 71
End: 2021-02-25
Attending: NURSE PRACTITIONER
Payer: MEDICARE

## 2021-02-25 VITALS
BODY MASS INDEX: 22.71 KG/M2 | RESPIRATION RATE: 20 BRPM | DIASTOLIC BLOOD PRESSURE: 66 MMHG | WEIGHT: 186.6 LBS | OXYGEN SATURATION: 98 % | SYSTOLIC BLOOD PRESSURE: 138 MMHG | TEMPERATURE: 97.9 F | HEART RATE: 89 BPM

## 2021-02-25 DIAGNOSIS — R07.81 RIB PAIN ON LEFT SIDE: ICD-10-CM

## 2021-02-25 DIAGNOSIS — S22.42XA CLOSED FRACTURE OF MULTIPLE RIBS OF LEFT SIDE, INITIAL ENCOUNTER: Primary | ICD-10-CM

## 2021-02-25 PROCEDURE — G0463 HOSPITAL OUTPT CLINIC VISIT: HCPCS

## 2021-02-25 PROCEDURE — 99213 OFFICE O/P EST LOW 20 MIN: CPT | Performed by: NURSE PRACTITIONER

## 2021-02-25 PROCEDURE — 71101 X-RAY EXAM UNILAT RIBS/CHEST: CPT | Mod: LT

## 2021-02-25 ASSESSMENT — PAIN SCALES - GENERAL: PAINLEVEL: EXTREME PAIN (8)

## 2021-02-25 NOTE — NURSING NOTE
"Chief Complaint   Patient presents with     Cough     Breathing Problem     Back Pain     low back     Patient is here for a cough, SOB and pain in his left \"lung\" that started 2/13/21. Patient states he had his second COVID vaccine on 2/12/21. Patient also has back pain in his lower back that he takes advil for with some relief of pain at night.     Initial /66   Pulse 89   Temp 97.9  F (36.6  C) (Tympanic)   Resp 20   Wt 84.6 kg (186 lb 9.6 oz)   SpO2 98%   BMI 22.71 kg/m   Estimated body mass index is 22.71 kg/m  as calculated from the following:    Height as of 9/28/20: 1.93 m (6' 4\").    Weight as of this encounter: 84.6 kg (186 lb 9.6 oz).  Medication Reconciliation: complete    Joselin Dumont LPN  "

## 2021-02-25 NOTE — PROGRESS NOTES
HPI:     Avinash Wray is a 70 year old male  who presents to [facility] today for concern for pneumonia.    States he has been having left lung and lower rib pain since 2/13, about 12 days now.  States he has a chronic cough and mild shortness of breath from smoking.  States his breathing is at his baseline.  Mild discomfort with deep breathing.  States he occasionally coughs up clear thick phlegm.  No chest tightness or heaviness.  No fevers.  Feels hot at night.  No chills.  Pain worsens with activity.  Painful to lay on left side.  States he also fell around the same time as the pain started.  States he landed on his left side on the deck while carrying in groceries.  Chronic back pain since 1996 from injury.  Chronic tingling in both feet.  Chronic weakness in bilateral legs.  No numbness or tingling in legs.  States he does exercises and stretches to keep up his strength.  States his back is not straight and his legs appear different lengths since his last chiropractor adjustment causing pain in his left lower back.  States he had his 1st covid vaccine on 1/14/21 and 2nd covid vaccine on 2/12.    Taking ibuprofen 600 mg at bedtime for his chronic back pain.        History reviewed. No pertinent past medical history.  Past Surgical History:   Procedure Laterality Date     COLONOSCOPY N/A 11/12/2020    7 tubular adenomas, follow up 3 years, 11/12/2023     ESOPHAGOSCOPY, GASTROSCOPY, DUODENOSCOPY (EGD), COMBINED N/A 11/12/2020    ALONSO's follow up 3 years, 11/12/2023     Social History     Tobacco Use     Smoking status: Current Every Day Smoker     Packs/day: 1.00     Smokeless tobacco: Never Used   Substance Use Topics     Alcohol use: Not Currently     Current Outpatient Medications   Medication Sig Dispense Refill     finasteride (PROSCAR) 5 MG tablet Take 5 mg by mouth       tamsulosin (FLOMAX) 0.4 MG capsule Take 0.4 mg by mouth daily       omeprazole (PRILOSEC) 20 MG DR capsule Take 1 capsule (20 mg)  by mouth daily (Patient not taking: Reported on 2/25/2021) 90 capsule 4     psyllium (METAMUCIL/KONSYL) 58.6 % powder Mix 1  tablespoonful in 8 ounces of water and take p.o. twice daily (Patient not taking: Reported on 2/25/2021) 1 Bottle 2     No Known Allergies      Past medical history, past surgical history, current medications and allergies reviewed and accurate to the best of my knowledge.        ROS:  Refer to HPI    /66   Pulse 89   Temp 97.9  F (36.6  C) (Tympanic)   Resp 20   Wt 84.6 kg (186 lb 9.6 oz)   SpO2 98%   BMI 22.71 kg/m      EXAM:  General Appearance: Well appearing adult male, appropriate appearance for age. No acute distress  Respiratory: normal chest wall and respirations.  Normal effort.  Clear to auscultation bilaterally, no wheezing, crackles or rhonchi.  No increased work of breathing. Occasional non productive cough appreciated.  Left lower lateral rib tenderness to palpation.  Cardiac: RRR with no murmurs  Musculoskeletal:  Equal movement of bilateral upper extremities.  Equal movement of bilateral lower extremities.  Limping gait due to leg height discrepancy.     Dermatological: Skin intact to back without erythema, rash, or bruising  Psychological: normal affect, alert, oriented, and pleasant.           Xray:  Results for orders placed or performed during the hospital encounter of 02/25/21   XR Ribs & Chest Lt 3v     Status: None    Narrative    XR RIBS & CHEST LT 3VW    HISTORY: 70 years Male Rib pain on left side    COMPARISON: None    TECHNIQUE: Chest x-ray with 3 views of the left ribs, 4 views total    FINDINGS: There are mildly displaced fractures of the distal left 10th  and 11th ribs.    Heart size and pulmonary vascularity are within normal limits. Lungs  are clear. There is no evidence of pneumothorax or hemothorax.      Impression    IMPRESSION: Left 10th and 11th rib fractures with mild displacement.  No evidence of pneumothorax or hemothorax.    JUDSON  MD STEVE           ASSESSMENT/PLAN:    I have reviewed the nursing notes.  I have reviewed the findings, diagnosis, plan and need for follow up with the patient.    1. Rib pain on left side    - XR Ribs & Chest Lt 3v; Future    2. Closed fracture of multiple ribs of left side, initial encounter    Xray of chest and left ribs completed and personally reviewed, appears to have a left rib fracture, radiologist over read:  Left 10th and 11th rib fractures with mild displacement.  No evidence of pneumothorax or hemothorax.        Symptomatic treatment - ice, heat, OTC lidocaine/pain patches PRN    May use over-the-counter Tylenol or ibuprofen PRN    Discussed warning signs/symptoms indicative of need to f/u  Follow up if symptoms persist or worsen or concerns      I explained my diagnostic considerations and recommendations to the patient, who voiced understanding and agreement with the treatment plan. All questions were answered. We discussed potential side effects of any prescribed or recommended therapies, as well as expectations for response to treatments.    Disclaimer:  This note consists of words and symbols derived from keyboarding, dictation, or using voice recognition software. As a result, there may be errors in the script that have gone undetected. Please consider this when interpreting information found in this note.

## 2021-03-04 ENCOUNTER — OFFICE VISIT (OUTPATIENT)
Dept: FAMILY MEDICINE | Facility: OTHER | Age: 71
End: 2021-03-04
Attending: NURSE PRACTITIONER
Payer: MEDICARE

## 2021-03-04 VITALS
DIASTOLIC BLOOD PRESSURE: 62 MMHG | HEIGHT: 76 IN | SYSTOLIC BLOOD PRESSURE: 108 MMHG | WEIGHT: 192 LBS | RESPIRATION RATE: 16 BRPM | OXYGEN SATURATION: 97 % | HEART RATE: 97 BPM | TEMPERATURE: 98.5 F | BODY MASS INDEX: 23.38 KG/M2

## 2021-03-04 DIAGNOSIS — N30.01 ACUTE CYSTITIS WITH HEMATURIA: Primary | ICD-10-CM

## 2021-03-04 DIAGNOSIS — R39.89 URINARY PROBLEM: ICD-10-CM

## 2021-03-04 DIAGNOSIS — Z87.898 H/O URINARY RETENTION: ICD-10-CM

## 2021-03-04 DIAGNOSIS — Z87.440 HISTORY OF UTI: ICD-10-CM

## 2021-03-04 LAB
ALBUMIN UR-MCNC: 30 MG/DL
APPEARANCE UR: ABNORMAL
BACTERIA #/AREA URNS HPF: ABNORMAL /HPF
BILIRUB UR QL STRIP: NEGATIVE
COLOR UR AUTO: YELLOW
GLUCOSE UR STRIP-MCNC: NEGATIVE MG/DL
HGB UR QL STRIP: ABNORMAL
KETONES UR STRIP-MCNC: NEGATIVE MG/DL
LEUKOCYTE ESTERASE UR QL STRIP: ABNORMAL
MUCOUS THREADS #/AREA URNS LPF: PRESENT /LPF
NITRATE UR QL: NEGATIVE
PH UR STRIP: 6.5 PH (ref 5–7)
RBC #/AREA URNS AUTO: 41 /HPF (ref 0–2)
SOURCE: ABNORMAL
SP GR UR STRIP: 1.02 (ref 1–1.03)
UROBILINOGEN UR STRIP-MCNC: NORMAL MG/DL (ref 0–2)
WBC #/AREA URNS AUTO: >182 /HPF (ref 0–5)
WBC CLUMPS #/AREA URNS HPF: PRESENT /HPF

## 2021-03-04 PROCEDURE — 99214 OFFICE O/P EST MOD 30 MIN: CPT | Performed by: NURSE PRACTITIONER

## 2021-03-04 PROCEDURE — G0463 HOSPITAL OUTPT CLINIC VISIT: HCPCS

## 2021-03-04 PROCEDURE — 81001 URINALYSIS AUTO W/SCOPE: CPT | Mod: ZL | Performed by: NURSE PRACTITIONER

## 2021-03-04 PROCEDURE — 87086 URINE CULTURE/COLONY COUNT: CPT | Mod: ZL | Performed by: NURSE PRACTITIONER

## 2021-03-04 RX ORDER — CIPROFLOXACIN 500 MG/1
500 TABLET, FILM COATED ORAL 2 TIMES DAILY
Qty: 14 TABLET | Refills: 0 | Status: SHIPPED | OUTPATIENT
Start: 2021-03-04 | End: 2021-03-11

## 2021-03-04 ASSESSMENT — MIFFLIN-ST. JEOR: SCORE: 1737.41

## 2021-03-04 ASSESSMENT — PAIN SCALES - GENERAL: PAINLEVEL: NO PAIN (0)

## 2021-03-04 NOTE — PROGRESS NOTES
HPI:    Avinash Wray is a 70 year old male  who presents to [facility] today for urinary concern.      States chronic urinary problems for years.  States [facility], next appointment is in June.  Difficulty initiating urinary stream, feels like pushing a boweling bowel out of a straw.  Nocturia about every 1.5 hours.  No urinary frequency during the day.  Intermittent dysuria.  States hx of incomplete bladder emptying.  States he voids small amounts.  No noted blood in urine.  States urine is darker, orange colored in the mornings.  States he doesn't drink water except sips with his medications.  Poor fluid intake.  States he drinks 2 to 3 cups of coffee and 1 can of ginger ale per day.    No fevers or chills.  No nausea or vomiting.  States abdominal pain and pressure in the morning just prior to urinating then resolves.  Chronic back pain, no change.  No diarrhea.  Constipation, irregular bowel movements, states he usually has a bowel movement about every 3 days but then goes up to 5 times that day but all hard stools.    Seen in ER 6 months ago with yeast in his urine but negative urine culture.  [facility]    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  States hx of bladder lesions that were non malignant that he had removed in 2013 and now gets scoped yearly for surveillance.    He states he previously had an enlarged prostate but since it has since reduced in size.  He takes Proscar and Flomax.    Normal colonoscopy November 2020.        History reviewed. No pertinent past medical history.  Past Surgical History:   Procedure Laterality Date     COLONOSCOPY N/A 11/12/2020    7 tubular adenomas, follow up 3 years, 11/12/2023     ESOPHAGOSCOPY, GASTROSCOPY, DUODENOSCOPY (EGD), COMBINED N/A 11/12/2020    ALONSO's follow up 3 years, 11/12/2023     Social History     Tobacco Use     Smoking status: Current Every  "Day Smoker     Packs/day: 1.00     Smokeless tobacco: Never Used   Substance Use Topics     Alcohol use: Not Currently     Current Outpatient Medications   Medication Sig Dispense Refill     ciprofloxacin (CIPRO) 500 MG tablet Take 1 tablet (500 mg) by mouth 2 times daily for 7 days 14 tablet 0     finasteride (PROSCAR) 5 MG tablet Take 5 mg by mouth       tamsulosin (FLOMAX) 0.4 MG capsule Take 0.4 mg by mouth daily       omeprazole (PRILOSEC) 20 MG DR capsule Take 1 capsule (20 mg) by mouth daily (Patient not taking: Reported on 2/25/2021) 90 capsule 4     psyllium (METAMUCIL/KONSYL) 58.6 % powder Mix 1  tablespoonful in 8 ounces of water and take p.o. twice daily (Patient not taking: Reported on 2/25/2021) 1 Bottle 2     No Known Allergies      Past medical history, past surgical history, current medications and allergies reviewed and accurate to the best of my knowledge.        ROS:  Refer to HPI    /62   Pulse 97   Temp 98.5  F (36.9  C) (Tympanic)   Resp 16   Ht 1.93 m (6' 4\")   Wt 87.1 kg (192 lb)   SpO2 97%   BMI 23.37 kg/m      EXAM:  General Appearance: Well appearing adult male, non ill appearance, appropriate appearance for age. No acute distress  Respiratory: normal chest wall and respirations.  Normal effort.  Clear to auscultation bilaterally, no wheezing, crackles or rhonchi.  No increased work of breathing.  No cough appreciated.  Cardiac: RRR with no murmurs  Abdomen: soft, nontender, no rigidity, no rebound tenderness or guarding, normal bowel sounds present  :  No suprapubic tenderness to palpation.  No CVA tenderness to palpation.     male exam deferred   Musculoskeletal:  Equal movement of bilateral upper extremities.  Equal movement of bilateral lower extremities.  Normal gait.    Psychological: normal affect, alert, oriented, and pleasant.       Labs:  Results for orders placed or performed in visit on 03/04/21   UA reflex to Microscopic and Culture     Status: Abnormal    " Specimen: Midstream Urine   Result Value Ref Range    Color Urine Yellow     Appearance Urine Slightly Cloudy     Glucose Urine Negative NEG^Negative mg/dL    Bilirubin Urine Negative NEG^Negative    Ketones Urine Negative NEG^Negative mg/dL    Specific Gravity Urine 1.019 1.003 - 1.035    Blood Urine Small (A) NEG^Negative    pH Urine 6.5 5.0 - 7.0 pH    Protein Albumin Urine 30 (A) NEG^Negative mg/dL    Urobilinogen mg/dL Normal 0.0 - 2.0 mg/dL    Nitrite Urine Negative NEG^Negative    Leukocyte Esterase Urine Large (A) NEG^Negative    Source Midstream Urine     RBC Urine 41 (H) 0 - 2 /HPF    WBC Urine >182 (H) 0 - 5 /HPF    WBC Clumps Present (A) NEG^Negative /HPF    Bacteria Urine Moderate (A) NEG^Negative /HPF    Mucous Urine Present (A) NEG^Negative /LPF               ASSESSMENT/PLAN:    I have reviewed the nursing notes.  I have reviewed the findings, diagnosis, plan and need for follow up with the patient.      ICD-10-CM    1. Acute cystitis with hematuria  N30.01 ciprofloxacin (CIPRO) 500 MG tablet   2. Urinary problem  R39.89 UA reflex to Microscopic and Culture     Urine Culture Aerobic Bacterial   3. H/O urinary retention  Z87.898 CANCELED: MEASURE POST-VOID RESIDUAL URINE/BLADDER CAPACITY, US NON-IMAGING   4. History of UTI  Z87.440          Urinalysis - slightly cloudy, small blood, negative nitrite, large leukocyte estrace  Urine microscopic - RBC 41, WBC >182 with clumps present, bacteria moderate    Urine culture pending  Will call if culture warrants change of abx.     May use Pyridium OTC PRN.     Continue Flomax and Proscar as previously prescribed    Encouraged fluids and frequent bladder emptying.    Patient refuses post void bladder scan today    Discussed warning signs/symptoms indicative of need to f/u    Follow up if symptoms persist or worsen or concerns  [facility]        I explained my diagnostic considerations and recommendations to the patient,  who voiced understanding and agreement with the treatment plan. All questions were answered. We discussed potential side effects of any prescribed or recommended therapies, as well as expectations for response to treatments.    Disclaimer:  This note consists of words and symbols derived from keyboarding, dictation, or using voice recognition software. As a result, there may be errors in the script that have gone undetected. Please consider this when interpreting information found in this note.

## 2021-03-04 NOTE — NURSING NOTE
"Chief Complaint   Patient presents with     Urinary Problem     Patient is here for pain with urination that started a \"while ago\". Patient states it has been worse in the last couple months.      Initial /62   Pulse 97   Temp 98.5  F (36.9  C) (Tympanic)   Resp 16   Ht 1.93 m (6' 4\")   Wt 87.1 kg (192 lb)   SpO2 97%   BMI 23.37 kg/m   Estimated body mass index is 23.37 kg/m  as calculated from the following:    Height as of this encounter: 1.93 m (6' 4\").    Weight as of this encounter: 87.1 kg (192 lb).  Medication Reconciliation: complete    Joselin Dumont LPN  "

## 2021-03-06 LAB
BACTERIA SPEC CULT: NORMAL
SPECIMEN SOURCE: NORMAL

## 2021-08-30 ENCOUNTER — TRANSFERRED RECORDS (OUTPATIENT)
Dept: HEALTH INFORMATION MANAGEMENT | Facility: OTHER | Age: 71
End: 2021-08-30

## 2021-10-09 ENCOUNTER — HEALTH MAINTENANCE LETTER (OUTPATIENT)
Age: 71
End: 2021-10-09

## 2021-10-12 ENCOUNTER — TRANSFERRED RECORDS (OUTPATIENT)
Dept: HEALTH INFORMATION MANAGEMENT | Facility: OTHER | Age: 71
End: 2021-10-12

## 2021-10-13 ENCOUNTER — TRANSFERRED RECORDS (OUTPATIENT)
Dept: HEALTH INFORMATION MANAGEMENT | Facility: OTHER | Age: 71
End: 2021-10-13

## 2021-12-12 ENCOUNTER — APPOINTMENT (OUTPATIENT)
Dept: ULTRASOUND IMAGING | Facility: OTHER | Age: 71
End: 2021-12-12
Attending: PHYSICIAN ASSISTANT
Payer: MEDICARE

## 2021-12-12 ENCOUNTER — HOSPITAL ENCOUNTER (EMERGENCY)
Facility: OTHER | Age: 71
Discharge: HOME | End: 2021-12-12
Attending: PHYSICIAN ASSISTANT | Admitting: PHYSICIAN ASSISTANT
Payer: MEDICARE

## 2021-12-12 VITALS
HEIGHT: 78 IN | RESPIRATION RATE: 18 BRPM | OXYGEN SATURATION: 100 % | SYSTOLIC BLOOD PRESSURE: 135 MMHG | TEMPERATURE: 97.4 F | DIASTOLIC BLOOD PRESSURE: 62 MMHG | BODY MASS INDEX: 21.4 KG/M2 | WEIGHT: 185 LBS | HEART RATE: 88 BPM

## 2021-12-12 DIAGNOSIS — N50.82 SCROTAL PAIN: ICD-10-CM

## 2021-12-12 DIAGNOSIS — I86.1 VARICOCELE: ICD-10-CM

## 2021-12-12 LAB
ALBUMIN SERPL-MCNC: 4.3 G/DL (ref 3.5–5.7)
ALBUMIN UR-MCNC: NEGATIVE MG/DL
ALP SERPL-CCNC: 46 U/L (ref 34–104)
ALT SERPL W P-5'-P-CCNC: 10 U/L (ref 7–52)
ANION GAP SERPL CALCULATED.3IONS-SCNC: 7 MMOL/L (ref 3–14)
APPEARANCE UR: CLEAR
AST SERPL W P-5'-P-CCNC: 16 U/L (ref 13–39)
BASOPHILS # BLD AUTO: 0.1 10E3/UL (ref 0–0.2)
BASOPHILS NFR BLD AUTO: 1 %
BILIRUB SERPL-MCNC: 0.6 MG/DL (ref 0.3–1)
BILIRUB UR QL STRIP: NEGATIVE
BUN SERPL-MCNC: 11 MG/DL (ref 7–25)
CALCIUM SERPL-MCNC: 9.9 MG/DL (ref 8.6–10.3)
CHLORIDE BLD-SCNC: 101 MMOL/L (ref 98–107)
CO2 SERPL-SCNC: 27 MMOL/L (ref 21–31)
COLOR UR AUTO: ABNORMAL
CREAT SERPL-MCNC: 0.95 MG/DL (ref 0.7–1.3)
EOSINOPHIL # BLD AUTO: 0.2 10E3/UL (ref 0–0.7)
EOSINOPHIL NFR BLD AUTO: 2 %
ERYTHROCYTE [DISTWIDTH] IN BLOOD BY AUTOMATED COUNT: 13.2 % (ref 10–15)
GFR SERPL CREATININE-BSD FRML MDRD: 81 ML/MIN/1.73M2
GLUCOSE BLD-MCNC: 111 MG/DL (ref 70–105)
GLUCOSE UR STRIP-MCNC: NEGATIVE MG/DL
HCT VFR BLD AUTO: 44.6 % (ref 40–53)
HGB BLD-MCNC: 15.3 G/DL (ref 13.3–17.7)
HGB UR QL STRIP: NEGATIVE
HYALINE CASTS: 1 /LPF
IMM GRANULOCYTES # BLD: 0 10E3/UL
IMM GRANULOCYTES NFR BLD: 0 %
KETONES UR STRIP-MCNC: NEGATIVE MG/DL
LACTATE SERPL-SCNC: 1.1 MMOL/L (ref 0.7–2)
LEUKOCYTE ESTERASE UR QL STRIP: ABNORMAL
LIPASE SERPL-CCNC: 25 U/L (ref 11–82)
LYMPHOCYTES # BLD AUTO: 1.5 10E3/UL (ref 0.8–5.3)
LYMPHOCYTES NFR BLD AUTO: 20 %
MCH RBC QN AUTO: 31.3 PG (ref 26.5–33)
MCHC RBC AUTO-ENTMCNC: 34.3 G/DL (ref 31.5–36.5)
MCV RBC AUTO: 91 FL (ref 78–100)
MONOCYTES # BLD AUTO: 0.5 10E3/UL (ref 0–1.3)
MONOCYTES NFR BLD AUTO: 6 %
MUCOUS THREADS #/AREA URNS LPF: PRESENT /LPF
NEUTROPHILS # BLD AUTO: 5.2 10E3/UL (ref 1.6–8.3)
NEUTROPHILS NFR BLD AUTO: 71 %
NITRATE UR QL: NEGATIVE
NRBC # BLD AUTO: 0 10E3/UL
NRBC BLD AUTO-RTO: 0 /100
PH UR STRIP: 6 [PH] (ref 5–9)
PLATELET # BLD AUTO: 181 10E3/UL (ref 150–450)
POTASSIUM BLD-SCNC: 4.2 MMOL/L (ref 3.5–5.1)
PROT SERPL-MCNC: 7.1 G/DL (ref 6.4–8.9)
RBC # BLD AUTO: 4.89 10E6/UL (ref 4.4–5.9)
RBC URINE: 3 /HPF
SODIUM SERPL-SCNC: 135 MMOL/L (ref 134–144)
SP GR UR STRIP: 1.01 (ref 1–1.03)
UROBILINOGEN UR STRIP-MCNC: NORMAL MG/DL
WBC # BLD AUTO: 7.4 10E3/UL (ref 4–11)
WBC URINE: 4 /HPF

## 2021-12-12 PROCEDURE — 99284 EMERGENCY DEPT VISIT MOD MDM: CPT | Performed by: PHYSICIAN ASSISTANT

## 2021-12-12 PROCEDURE — 36415 COLL VENOUS BLD VENIPUNCTURE: CPT | Performed by: PHYSICIAN ASSISTANT

## 2021-12-12 PROCEDURE — 76870 US EXAM SCROTUM: CPT | Mod: TC

## 2021-12-12 PROCEDURE — 250N000011 HC RX IP 250 OP 636: Performed by: PHYSICIAN ASSISTANT

## 2021-12-12 PROCEDURE — 96374 THER/PROPH/DIAG INJ IV PUSH: CPT | Performed by: PHYSICIAN ASSISTANT

## 2021-12-12 PROCEDURE — 80053 COMPREHEN METABOLIC PANEL: CPT | Performed by: PHYSICIAN ASSISTANT

## 2021-12-12 PROCEDURE — 250N000013 HC RX MED GY IP 250 OP 250 PS 637: Performed by: PHYSICIAN ASSISTANT

## 2021-12-12 PROCEDURE — 99284 EMERGENCY DEPT VISIT MOD MDM: CPT | Mod: 25 | Performed by: PHYSICIAN ASSISTANT

## 2021-12-12 PROCEDURE — 81001 URINALYSIS AUTO W/SCOPE: CPT | Performed by: PHYSICIAN ASSISTANT

## 2021-12-12 PROCEDURE — 83605 ASSAY OF LACTIC ACID: CPT | Performed by: PHYSICIAN ASSISTANT

## 2021-12-12 PROCEDURE — 85025 COMPLETE CBC W/AUTO DIFF WBC: CPT | Performed by: PHYSICIAN ASSISTANT

## 2021-12-12 PROCEDURE — 83690 ASSAY OF LIPASE: CPT | Performed by: PHYSICIAN ASSISTANT

## 2021-12-12 PROCEDURE — 87086 URINE CULTURE/COLONY COUNT: CPT | Performed by: PHYSICIAN ASSISTANT

## 2021-12-12 PROCEDURE — 81001 URINALYSIS AUTO W/SCOPE: CPT | Performed by: STUDENT IN AN ORGANIZED HEALTH CARE EDUCATION/TRAINING PROGRAM

## 2021-12-12 RX ORDER — IBUPROFEN 400 MG/1
400 TABLET, FILM COATED ORAL ONCE
Status: COMPLETED | OUTPATIENT
Start: 2021-12-12 | End: 2021-12-12

## 2021-12-12 RX ORDER — ONDANSETRON 2 MG/ML
4 INJECTION INTRAMUSCULAR; INTRAVENOUS ONCE
Status: COMPLETED | OUTPATIENT
Start: 2021-12-12 | End: 2021-12-12

## 2021-12-12 RX ADMIN — ONDANSETRON HYDROCHLORIDE 4 MG: 2 INJECTION, SOLUTION INTRAMUSCULAR; INTRAVENOUS at 13:21

## 2021-12-12 RX ADMIN — IBUPROFEN 400 MG: 400 TABLET, FILM COATED ORAL at 13:21

## 2021-12-12 ASSESSMENT — ENCOUNTER SYMPTOMS
CHILLS: 0
NAUSEA: 1
CONFUSION: 0
FEVER: 0
CHEST TIGHTNESS: 0
WOUND: 0
SHORTNESS OF BREATH: 0
BACK PAIN: 0
ABDOMINAL PAIN: 0
BRUISES/BLEEDS EASILY: 0
HEMATURIA: 0

## 2021-12-12 ASSESSMENT — MIFFLIN-ST. JEOR: SCORE: 1732.4

## 2021-12-12 NOTE — ED PROVIDER NOTES
History     Chief Complaint   Patient presents with     Groin Pain     Nausea     HPI  Avinash Wray is a 71 year old male with  history of  Groin pain left side started intermittently 1 week ago, no today constant pain, pt does self cath for urine, also c/o vomiting and dry heaves last past few days reported by patient   Significant symptoms had onset 1 week(s) ago.    Allergies:  No Known Allergies    Problem List:    There are no problems to display for this patient.       Past Medical History:    No past medical history on file.    Past Surgical History:    Past Surgical History:   Procedure Laterality Date     COLONOSCOPY N/A 11/12/2020    7 tubular adenomas, follow up 3 years, 11/12/2023     ESOPHAGOSCOPY, GASTROSCOPY, DUODENOSCOPY (EGD), COMBINED N/A 11/12/2020    ALONSO's follow up 3 years, 11/12/2023       Family History:    No family history on file.    Social History:  Marital Status:   [4]  Social History     Tobacco Use     Smoking status: Current Every Day Smoker     Packs/day: 1.00     Smokeless tobacco: Never Used   Substance Use Topics     Alcohol use: Not Currently     Drug use: Not Currently        Medications:    finasteride (PROSCAR) 5 MG tablet  tamsulosin (FLOMAX) 0.4 MG capsule  omeprazole (PRILOSEC) 20 MG DR capsule  psyllium (METAMUCIL/KONSYL) 58.6 % powder          Review of Systems   Constitutional: Negative for chills and fever.   HENT: Negative for congestion.    Eyes: Negative for visual disturbance.   Respiratory: Negative for chest tightness and shortness of breath.    Cardiovascular: Negative for chest pain.   Gastrointestinal: Positive for nausea. Negative for abdominal pain.   Genitourinary: Negative for hematuria.        Left testicle pain   Musculoskeletal: Negative for back pain.   Skin: Negative for rash and wound.   Neurological: Negative for syncope.   Hematological: Does not bruise/bleed easily.   Psychiatric/Behavioral: Negative for confusion.       Physical  "Exam   BP: 135/62  Pulse: 88  Temp: 97.4  F (36.3  C)  Resp: 18  Height: 198.1 cm (6' 6\")  Weight: 83.9 kg (185 lb)  SpO2: 100 %      Physical Exam  Constitutional:       General: He is not in acute distress.     Appearance: He is well-developed. He is not diaphoretic.   HENT:      Head: Normocephalic and atraumatic.   Eyes:      General: No scleral icterus.     Conjunctiva/sclera: Conjunctivae normal.   Cardiovascular:      Rate and Rhythm: Normal rate and regular rhythm.   Pulmonary:      Effort: Pulmonary effort is normal.      Breath sounds: Normal breath sounds.   Abdominal:      Palpations: Abdomen is soft.      Tenderness: There is no abdominal tenderness.   Genitourinary:     Comments: TTP left testicle/left inguinal canal area  Musculoskeletal:         General: No deformity.      Cervical back: Neck supple.   Lymphadenopathy:      Cervical: No cervical adenopathy.   Skin:     General: Skin is warm and dry.      Coloration: Skin is not jaundiced.      Findings: No rash.   Neurological:      Mental Status: He is alert and oriented to person, place, and time. Mental status is at baseline.   Psychiatric:         Mood and Affect: Mood normal.         Behavior: Behavior normal.         Thought Content: Thought content normal.         Judgment: Judgment normal.         ED Course                 Procedures              Critical Care time:  none               Results for orders placed or performed during the hospital encounter of 12/12/21 (from the past 24 hour(s))   UA with Microscopic reflex to Culture    Specimen: Urine, Midstream   Result Value Ref Range    Color Urine Light Yellow Colorless, Straw, Light Yellow, Yellow    Appearance Urine Clear Clear    Glucose Urine Negative Negative mg/dL    Bilirubin Urine Negative Negative    Ketones Urine Negative Negative mg/dL    Specific Gravity Urine 1.008 1.000 - 1.030    Blood Urine Negative Negative    pH Urine 6.0 5.0 - 9.0    Protein Albumin Urine Negative " Negative mg/dL    Urobilinogen Urine Normal Normal, 2.0 mg/dL    Nitrite Urine Negative Negative    Leukocyte Esterase Urine Moderate (A) Negative    Mucus Urine Present (A) None Seen /LPF    RBC Urine 3 (H) <=2 /HPF    WBC Urine 4 <=5 /HPF    Hyaline Casts Urine 1 <=2 /LPF    Narrative    Urine Culture ordered based on laboratory criteria   CBC with platelets differential    Narrative    The following orders were created for panel order CBC with platelets differential.  Procedure                               Abnormality         Status                     ---------                               -----------         ------                     CBC with platelets and d...[[id number]]                      Final result                 Please view results for these tests on the individual orders.   Comprehensive metabolic panel   Result Value Ref Range    Sodium 135 134 - 144 mmol/L    Potassium 4.2 3.5 - 5.1 mmol/L    Chloride 101 98 - 107 mmol/L    Carbon Dioxide (CO2) 27 21 - 31 mmol/L    Anion Gap 7 3 - 14 mmol/L    Urea Nitrogen 11 7 - 25 mg/dL    Creatinine 0.95 0.70 - 1.30 mg/dL    Calcium 9.9 8.6 - 10.3 mg/dL    Glucose 111 (H) 70 - 105 mg/dL    Alkaline Phosphatase 46 34 - 104 U/L    AST 16 13 - 39 U/L    ALT 10 7 - 52 U/L    Protein Total 7.1 6.4 - 8.9 g/dL    Albumin 4.3 3.5 - 5.7 g/dL    Bilirubin Total 0.6 0.3 - 1.0 mg/dL    GFR Estimate 81 >60 mL/min/1.73m2   Lipase   Result Value Ref Range    Lipase 25 11 - 82 U/L   Lactic acid whole blood   Result Value Ref Range    Lactic Acid 1.1 0.7 - 2.0 mmol/L   CBC with platelets and differential   Result Value Ref Range    WBC Count 7.4 4.0 - 11.0 10e3/uL    RBC Count 4.89 4.40 - 5.90 10e6/uL    Hemoglobin 15.3 13.3 - 17.7 g/dL    Hematocrit 44.6 40.0 - 53.0 %    MCV 91 78 - 100 fL    MCH 31.3 26.5 - 33.0 pg    MCHC 34.3 31.5 - 36.5 g/dL    RDW 13.2 10.0 - 15.0 %    Platelet Count 181 150 - 450 10e3/uL    % Neutrophils 71 %    % Lymphocytes 20 %    % Monocytes 6 %     % Eosinophils 2 %    % Basophils 1 %    % Immature Granulocytes 0 %    NRBCs per 100 WBC 0 <1 /100    Absolute Neutrophils 5.2 1.6 - 8.3 10e3/uL    Absolute Lymphocytes 1.5 0.8 - 5.3 10e3/uL    Absolute Monocytes 0.5 0.0 - 1.3 10e3/uL    Absolute Eosinophils 0.2 0.0 - 0.7 10e3/uL    Absolute Basophils 0.1 0.0 - 0.2 10e3/uL    Absolute Immature Granulocytes 0.0 <=0.4 10e3/uL    Absolute NRBCs 0.0 10e3/uL   US Testicular & Scrotum w Doppler Ltd    Narrative    PROCEDURE INFORMATION:   Exam: US Scrotum   Exam date and time: 12/12/2021 1:46 PM   Age: 71 years old   Clinical indication: Groin pain and scrotum pain; Additional info: Left   testicle and left inguinal pain, testicular abnormality, sign of inguinal   hernia?     TECHNIQUE:   Imaging protocol: Real-time ultrasound of the scrotum and contents with color   Doppler and image documentation.     COMPARISON:   No relevant prior studies available.     FINDINGS:   Right testicle: The right testicle is normal appearance measuring 3.9 x 2.5 x   2.1 cm. Normal arterial flow in the right testicle.   Left testicle: The left testicle is normal appearance measuring 4.3 x 3.0 x 1.8   cm. Normal arterial flow in the left testicle.   Epididymides: Normal.   Scrotum: Small left varicoceles. No hydrocele.   Other findings: Images of the left inguinal region demonstrate no definite   evidence of hernia.       Impression    IMPRESSION:   1. No evidence of testicular mass or torsion.   2. Images of the left inguinal region demonstrate no definite evidence of   hernia.     THIS DOCUMENT HAS BEEN ELECTRONICALLY SIGNED BY HAYDEE CELESTIN MD       Medications   ondansetron (ZOFRAN) injection 4 mg (4 mg Intravenous Given 12/12/21 1321)   ibuprofen (ADVIL/MOTRIN) tablet 400 mg (400 mg Oral Given 12/12/21 1321)       Assessments & Plan (with Medical Decision Making)   Pt nontoxic in NAD. Heart, lung, bowel sounds normal, abd soft, nontender to palpation, nondistended. VSS, afebrile.     He  does have TTP left testicle/left inguinal canal area.     Lab work appears excellent, urinalysis shows no sign of infection.    Ultrasound read as:  1. No evidence of testicular mass or torsion.   2. Images of the left inguinal region demonstrate no definite evidence of   hernia.     Although the ultrasound did also mention some left-sided varicoceles.    Upon reassessment patient continues to do very well.  His nausea is gone and he reports having no discomfort while at rest.  Does say when he stands up it seems to get a little bit worse.  I discussed her findings I did offer further imaging of abdomen but he declined at this time.  We did discuss the varicoceles.  I recommended that he wear some tighter fitting underwear/jockstrap.  He reports that he does not usually wear underwear at all.  He feels comfortable going home at this time and a referral is placed for him to have close follow-up.  He will continue with conservative treatment and return if there are worse or concerning symptoms.  He understands and agrees with plan the patient is discharged.    Zac Delgado PA-C    I have reviewed the nursing notes.    I have reviewed the findings, diagnosis, plan and need for follow up with the patient.       Discharge Medication List as of 12/12/2021  2:33 PM          Final diagnoses:   Varicocele   Scrotal pain       12/12/2021   [facility]     Zac Delgado PA  12/12/21 2646

## 2021-12-12 NOTE — ED TRIAGE NOTES
ED Nursing Triage Note (General)   ________________________________    Avinash AGUSTIN Nils is a 71 year old Male that presents to triage private car  With history of  Groin pain left side started intermittently 1 week ago, no today constant pain, pt does self cath for urine, also c/o vomiting and dry heaves last past few days reported by patient   Significant symptoms had onset 1 week(s) ago.    Patient appears alert , in mild distress., and cooperative behavior.    GCS Total = 15  Airway: intact  Breathing noted as Normal  Circulation Normal  Skin:  Normal  Action taken:  Triage order initiated      PRE HOSPITAL PRIOR LIVING SITUATION Spouse

## 2021-12-12 NOTE — DISCHARGE INSTRUCTIONS
Get plenty of fluids and rest.  As we discussed all of your lab work and imaging appear well.  It is a may have a little bit of a left-sided varicocele your scrotum, see attached descriptions and instructions.  In general, where some tight fitting underwear/jockstrap, can also alternate Tylenol and ibuprofen and use ice.  Usually these will resolve on their own but if you are having worsening or concerning symptoms, increased fevers, increased abdominal discomfort and please return for further evaluation.  Referral was placed follow-up with PCP as needed.

## 2021-12-14 ENCOUNTER — OFFICE VISIT (OUTPATIENT)
Dept: FAMILY MEDICINE | Facility: OTHER | Age: 71
End: 2021-12-14
Attending: FAMILY MEDICINE
Payer: MEDICARE

## 2021-12-14 VITALS
BODY MASS INDEX: 21.38 KG/M2 | RESPIRATION RATE: 20 BRPM | DIASTOLIC BLOOD PRESSURE: 64 MMHG | SYSTOLIC BLOOD PRESSURE: 100 MMHG | OXYGEN SATURATION: 98 % | TEMPERATURE: 97 F | WEIGHT: 185 LBS | HEART RATE: 78 BPM

## 2021-12-14 DIAGNOSIS — M47.27 OSTEOARTHRITIS OF SPINE WITH RADICULOPATHY, LUMBOSACRAL REGION: ICD-10-CM

## 2021-12-14 DIAGNOSIS — K22.10 BARRETT'S ESOPHAGEAL ULCERATION: ICD-10-CM

## 2021-12-14 DIAGNOSIS — R10.32 LEFT INGUINAL PAIN: Primary | ICD-10-CM

## 2021-12-14 PROBLEM — G25.81 RESTLESS LEG: Status: ACTIVE | Noted: 2021-12-14

## 2021-12-14 PROBLEM — J93.9 PNEUMOTHORAX: Status: ACTIVE | Noted: 2019-06-13

## 2021-12-14 PROBLEM — E78.5 HYPERLIPIDEMIA: Status: ACTIVE | Noted: 2021-12-14

## 2021-12-14 LAB — BACTERIA UR CULT: NO GROWTH

## 2021-12-14 PROCEDURE — G0463 HOSPITAL OUTPT CLINIC VISIT: HCPCS | Mod: 25

## 2021-12-14 PROCEDURE — 99215 OFFICE O/P EST HI 40 MIN: CPT | Performed by: FAMILY MEDICINE

## 2021-12-14 PROCEDURE — G0463 HOSPITAL OUTPT CLINIC VISIT: HCPCS

## 2021-12-14 ASSESSMENT — PAIN SCALES - GENERAL: PAINLEVEL: MODERATE PAIN (4)

## 2021-12-14 NOTE — RESULT ENCOUNTER NOTE
"Final urine culture report shows \"NO GROWTH\" and is NEGATIVE.  Recommendations in treatment per [facility] ED Lab result Urine culture protocol.  "

## 2021-12-14 NOTE — NURSING NOTE
"Patient presents to the clinic for ER follow up.    FOOD SECURITY SCREENING QUESTIONS:    The next two questions are to help us understand your food security.  If you are feeling you need any assistance in this area, we have resources available to support you today.    Hunger Vital Signs:  Within the past 12 months we worried whether our food would run out before we got money to buy more. Never  Within the past 12 months the food we bought just didn't last and we didn't have money to get more. Never    Advance Care Directive on file? yes  Advance Care Directive provided to patient? Declined[facility]    Chief Complaint   Patient presents with     Clinic Care Coordination - Follow-up     ER       Initial /64 (BP Location: Right arm, Patient Position: Sitting, Cuff Size: Adult Large)   Pulse 78   Temp 97  F (36.1  C) (Tympanic)   Resp 20   Wt 83.9 kg (185 lb)   SpO2 98%   BMI 21.38 kg/m   Estimated body mass index is 21.38 kg/m  as calculated from the following:    Height as of 12/12/21: 1.981 m (6' 6\").    Weight as of this encounter: 83.9 kg (185 lb).  Medication Reconciliation: complete    Shira Abraham LPN       "

## 2021-12-14 NOTE — PROGRESS NOTES
"Nursing Notes:   Shira Abraham LPN  12/14/2021 11:53 AM  Sign at exiting of workspace  Patient presents to the clinic for ER follow up.    FOOD SECURITY SCREENING QUESTIONS:    The next two questions are to help us understand your food security.  If you are feeling you need any assistance in this area, we have resources available to support you today.    Hunger Vital Signs:  Within the past 12 months we worried whether our food would run out before we got money to buy more. Never  Within the past 12 months the food we bought just didn't last and we didn't have money to get more. Never    Advance Care Directive on file? yes  Advance Care Directive provided to patient? Declined-[facility]    Chief Complaint   Patient presents with     Clinic Care Coordination - Follow-up     ER       Initial /64 (BP Location: Right arm, Patient Position: Sitting, Cuff Size: Adult Large)   Pulse 78   Temp 97  F (36.1  C) (Tympanic)   Resp 20   Wt 83.9 kg (185 lb)   SpO2 98%   BMI 21.38 kg/m   Estimated body mass index is 21.38 kg/m  as calculated from the following:    Height as of 12/12/21: 1.981 m (6' 6\").    Weight as of this encounter: 83.9 kg (185 lb).  Medication Reconciliation: complete    Shira Abraham LPN            Assessment & Plan       ICD-10-CM    1. Left inguinal pain  R10.32 MR Lumbar Spine w/o Contrast   2. Osteoarthritis of spine with radiculopathy, lumbosacral region  M47.27 MR Lumbar Spine w/o Contrast   3. Hou's esophageal ulceration  K22.10 omeprazole (PRILOSEC) 20 MG DR capsule     Reviewed imaging findings from the ED.  Discussed differential for his discomfort.  With the burning discomfort sounds more like Anoro pathic type pain.  He certainly could have a lumbar radiculopathy at L1 causing the symptoms.  Based on up on anatomical distribution, this seems more likely.  Otherwise he would have to have local entrapment of the ilioinguinal or genitofemoral nerves, which " seems less likely based on the distribution of pain.  Order lumbar MRI to see if there is any nerve entrapment at T12 or L1 that might explain his symptoms.    Discussed potential herpes zoster, but no lesions evident.  If MRI is fine, consider the local entrapment of nerve etiology and consider CT abdomen pelvis for further evaluation.    No treatment at this time as etiology is unclear.  He does have physical therapy at [facility] scheduled in January and seems most inclined to pursue this route a lumbar radiculopathy is present.    He has not been taking omeprazole for Hou's esophagus.  Recommend regular use of PPI.  Additionally we reviewed how smoking is a risk factor for esophageal cancer and he should quit.    Tobacco Cessation:   reports that he has been smoking. He has been smoking about 1.00 pack per day. He has never used smokeless tobacco.  Tobacco Cessation Action Plan: Information offered: Patient not interested at this time      Follow up based on MRI    42 minutes spent on the date of the encounter doing chart review, review of test results and patient visit     Felix Lambert MD     [facility]      SUBJECTIVE:  71 year old male presents to follow up on left groin and scrotal pain  Seen in ED on 12/12 with normal scrotal ultrasound only showing small varicoceles  Pain persist since that time  Worse when standing. Burning discomfort when this occurs.  Seems to radiate from the left groin into the left scrotum.  Better sitting and laying down.     He has BPH with urinary obstruction despite use of finasteride and tamsulosin and has been performing self cath.  He wonders if this could contribute to the pain.  However, does not notice any correlation between catheterization and the left groin pain.  No blood in the urine recently  History of left testicular torsion in 1971    Took magnesium citrate yesterday with good results. Nausea improved after BMs.    He reports [facility] bad  [facility]  Does not recall any MRI.  X-rays have been obtained per patient, but are not visible.    Hou's esophagus seen on EGD November 2020.  He has not been on omeprazole consistently.  Continues to smoke.    REVIEW OF SYSTEMS:    Pertinent items are noted in HPI.    Current Outpatient Medications   Medication Sig Dispense Refill     finasteride (PROSCAR) 5 MG tablet Take 5 mg by mouth       omeprazole (PRILOSEC) 20 MG DR capsule Take 1 capsule (20 mg) by mouth daily 90 capsule 4     tamsulosin (FLOMAX) 0.4 MG capsule Take 0.4 mg by mouth daily       No Known Allergies    OBJECTIVE:  /64 (BP Location: Right arm, Patient Position: Sitting, Cuff Size: Adult Large)   Pulse 78   Temp 97  F (36.1  C) (Tympanic)   Resp 20   Wt 83.9 kg (185 lb)   SpO2 98%   BMI 21.38 kg/m      EXAM:  General Appearance: Pleasant, alert, appropriate appearance for age. No acute distress  Musculoskeletal Exam: Lumbar Spine: mild tenderness over lumbar paraspinous muscles  Abdomen: Soft, mild right lower quadrant tenderness.  Not able to replicate left abdominal or groin discomfort.  : Scar on lateral left scrotum from previous torsion.  No signs of skin lesions.  No left inguinal hernia.  Palpation of the testicle does not replicate pain.  Neurologic Exam: reflexes 2+, strength symmetric lower extremities; SLR negative bilaterally  Psychiatric: Normal affect and mentation

## 2021-12-14 NOTE — PATIENT INSTRUCTIONS
Ordered lumbar MRI to see if there is a pinched nerve in the L1 region  Stop finasteride and tamsulosin  Take omeprazole 20 mg daily to protect the esophagus

## 2022-01-29 ENCOUNTER — HEALTH MAINTENANCE LETTER (OUTPATIENT)
Age: 72
End: 2022-01-29

## 2022-02-09 ENCOUNTER — TRANSFERRED RECORDS (OUTPATIENT)
Dept: HEALTH INFORMATION MANAGEMENT | Facility: OTHER | Age: 72
End: 2022-02-09

## 2022-06-22 ENCOUNTER — TRANSFERRED RECORDS (OUTPATIENT)
Dept: MULTI SPECIALTY CLINIC | Facility: CLINIC | Age: 72
End: 2022-06-22

## 2022-09-17 ENCOUNTER — HEALTH MAINTENANCE LETTER (OUTPATIENT)
Age: 72
End: 2022-09-17

## 2022-09-23 ENCOUNTER — TRANSFERRED RECORDS (OUTPATIENT)
Dept: HEALTH INFORMATION MANAGEMENT | Facility: OTHER | Age: 72
End: 2022-09-23

## 2022-10-07 ENCOUNTER — TRANSFERRED RECORDS (OUTPATIENT)
Dept: HEALTH INFORMATION MANAGEMENT | Facility: OTHER | Age: 72
End: 2022-10-07

## 2023-03-08 ENCOUNTER — ALLIED HEALTH/NURSE VISIT (OUTPATIENT)
Dept: FAMILY MEDICINE | Facility: OTHER | Age: 73
End: 2023-03-08
Payer: MEDICARE

## 2023-03-08 ENCOUNTER — TELEPHONE (OUTPATIENT)
Dept: NURSING | Facility: CLINIC | Age: 73
End: 2023-03-08

## 2023-03-08 DIAGNOSIS — Z20.822 EXPOSURE TO 2019 NOVEL CORONAVIRUS: Primary | ICD-10-CM

## 2023-03-08 LAB — SARS-COV-2 RNA RESP QL NAA+PROBE: POSITIVE

## 2023-03-08 PROCEDURE — C9803 HOPD COVID-19 SPEC COLLECT: HCPCS

## 2023-03-08 PROCEDURE — U0003 INFECTIOUS AGENT DETECTION BY NUCLEIC ACID (DNA OR RNA); SEVERE ACUTE RESPIRATORY SYNDROME CORONAVIRUS 2 (SARS-COV-2) (CORONAVIRUS DISEASE [COVID-19]), AMPLIFIED PROBE TECHNIQUE, MAKING USE OF HIGH THROUGHPUT TECHNOLOGIES AS DESCRIBED BY CMS-2020-01-R: HCPCS | Mod: ZL

## 2023-03-08 NOTE — TELEPHONE ENCOUNTER
Patient classified as COVID treatment eligible by Epic high risk algorithm:  Yes    Coronavirus (COVID-19) Notification    Reason for call  Notify of POSITIVE COVID-19 lab result, assess symptoms,  review [facility] recommendations    Lab Result   Lab test for 2019-nCoV rRt-PCR or SARS-COV-2 PCR  Oropharyngeal AND/OR nasopharyngeal swabs were POSITIVE for 2019-nCoV RNA [OR] SARS-COV-2 RNA (COVID-19) RNA     We have been unable to reach patient by phone at this time to notify of their Positive COVID-19 result.    Left voicemail message requesting a call back to [phone] [facility] for results.        A Positive COVID-19 letter will be sent via [facility] or the mail.    Karina Cristina

## 2023-03-09 ENCOUNTER — VIRTUAL VISIT (OUTPATIENT)
Dept: URGENT CARE | Facility: CLINIC | Age: 73
End: 2023-03-09
Payer: MEDICARE

## 2023-03-09 DIAGNOSIS — J93.11 PRIMARY SPONTANEOUS PNEUMOTHORAX: ICD-10-CM

## 2023-03-09 DIAGNOSIS — C67.9 MALIGNANT NEOPLASM OF URINARY BLADDER, UNSPECIFIED SITE (H): ICD-10-CM

## 2023-03-09 DIAGNOSIS — U07.1 INFECTION DUE TO 2019 NOVEL CORONAVIRUS: Primary | ICD-10-CM

## 2023-03-09 PROBLEM — N40.0 HYPERTROPHY OF PROSTATE WITHOUT URINARY OBSTRUCTION AND OTHER LOWER URINARY TRACT SYMPTOMS (LUTS): Status: ACTIVE | Noted: 2023-03-09

## 2023-03-09 PROCEDURE — 99213 OFFICE O/P EST LOW 20 MIN: CPT | Mod: CS

## 2023-03-09 RX ORDER — TAMSULOSIN HYDROCHLORIDE 0.4 MG/1
0.8 CAPSULE ORAL
COMMUNITY
Start: 2022-09-23

## 2023-03-09 RX ORDER — FINASTERIDE 5 MG/1
5 TABLET, FILM COATED ORAL
COMMUNITY
Start: 2022-09-23

## 2023-03-09 RX ORDER — ATORVASTATIN CALCIUM 20 MG/1
20 TABLET, FILM COATED ORAL
COMMUNITY
Start: 2023-01-13

## 2023-03-09 NOTE — PATIENT INSTRUCTIONS
Symptoms began March 8  Stay home and away from others through March 13  You may be around others wearing a well fitting mask on March 14- March 18  Your isolation restrictions are over on March 19 as long as your symptoms are improving and you have been fever free for 24 hours, even if you still test positive for COVID.  However, if you test negative twice, at least 48 hours apart between days 6-10, you can stop wearing your mask earlier    Paxlovid can cause a rebound. This can happen if there is enough virus left in your body when the course of medication is complete that your symptoms develop again. If you start to feel ill again soon after finishing your course of Paxlovid, please repeat the above isolation to prevent spreading the virus to others.    Hold atorvastatin and tamsulosin, restart March 18    Visit the CDC websites for more information and most up to date guidelines:  www.cdc.gov/coronavirus/2019-ncov/your-health/isolation.html  www.cdc.gov/coronavirus/2019-ncov/hcp/duration-isolation.html

## 2023-03-09 NOTE — PROGRESS NOTES
"Assessment & Plan     Infection due to 2019 novel coronavirus  - nirmatrelvir and ritonavir (PAXLOVID) 300 mg/100 mg therapy pack; Take 3 tablets by mouth 2 times daily for 5 days    Primary spontaneous pneumothorax  Spontaneous pneumothorax x3 in 2 months, had talc procedure.  No issues since.    Malignant neoplasm of urinary bladder, unspecified site (H)  Cancer in 1973 treated with ablation. In 2018 had tumor surgically removed with chemotherapy injection into bladder. Self caths.      Return in about 1 week (around 3/16/2023) for visit with primary care provider if not improving.   COVID-19 positive patient.  Encounter for consideration of medication intervention. Patient does qualify for a prescription. Full discussion with patient including medication options, risks and benefits. Potential drug interactions reviewed with patient.     Treatment Planned: Paxlovid  Temporary change to home medications: hold Flomax and atorvastatin, restart March 18    Estimated body mass index is 21.38 kg/m  as calculated from the following:    Height as of 12/12/21: 1.981 m (6' 6\").    Weight as of 12/14/21: 83.9 kg (185 lb).  GFR Estimate   Date Value Ref Range Status   12/12/2021 81 >60 mL/min/1.73m2 Final     Comment:     As of July 11, 2021, eGFR is calculated by the CKD-EPI creatinine equation, without race adjustment. eGFR can be influenced by muscle mass, exercise, and diet. The reported eGFR is an estimation only and is only applicable if the renal function is stable.   09/28/2020 75 >60 mL/min/[1.73_m2] Final     ALT   Date Value Ref Range Status   12/12/2021 10 7 - 52 U/L Final   09/28/2020 10 7 - 52 U/L Final     AST   Date Value Ref Range Status   12/12/2021 16 13 - 39 U/L Final   09/28/2020 17 13 - 39 U/L Final     Alkaline Phosphatase   Date Value Ref Range Status   12/12/2021 46 34 - 104 U/L Final   09/28/2020 61 34 - 104 U/L Final     Lab Results   Component Value Date    [id number] Positive (A) 03/08/2023 "       Aury Valle PA-C  [facility]    Subjective   Avinash Wray is a 72 year old who presents for the following health issues    HPI    Star presents via video after testing positive for COVID-19.  His wife has not been feeling although I will so she went to get a COVID test and he went in to be tested as well simply due to the closure.  He also tested positive.  He notes that he has been more short of breath than usual for the last 6 months or so but no recent changes.  Shortness of breath seems a bit worse when he is inside the house, not when he is outside shoveling or being active.  He is also had a productive cough.  He is a current smoker and has a CT yearly for evaluation.  He has been fully vaccinated against COVID, his most recent booster was Norma 3, 2022.    Review of Systems   ROS negative except as stated above.      Objective    Physical Exam   GENERAL: Healthy, alert and no distress  EYES: Eyes grossly normal to inspection.  No discharge or erythema, or obvious scleral/conjunctival abnormalities.  HENT: Normal cephalic/atraumatic.  External ears, nose and mouth without ulcers or lesions.  No nasal drainage visible.  RESP: No audible cough wheeze or visible cyanosis.  No visible retractions or increased work of breathing.    SKIN: Visible skin clear. No significant rash, abnormal pigmentation or lesions.  NEURO: Cranial nerves grossly intact.  Mentation and speech appropriate for age.  PSYCH: Mentation appears normal, affect normal/bright, judgement and insight intact, normal speech and appearance well-groomed.    Type of service:  Video Visit  Video Start Time: 10:58AM  Video End Time: 11:13AM  Originating Location (pt. Location): Home  Distant Location (provider location):  [facility]- offsite at home, [facility]  Platform used for Video Visit: Sada

## 2023-03-20 ENCOUNTER — NURSE TRIAGE (OUTPATIENT)
Dept: NURSING | Facility: CLINIC | Age: 73
End: 2023-03-20
Payer: MEDICARE

## 2023-03-20 NOTE — TELEPHONE ENCOUNTER
PT calling with COVID vaccine and isolation questions. All pt's questions were addressed.    Josie Torres, RN, BSN  [facility] Nurse Advisor 4:13 PM 3/20/2023       Reason for Disposition    Information only question and nurse able to answer    Additional Information    Negative: Nursing judgment    Negative: Nursing judgment    Negative: Nursing judgment    Negative: Nursing judgment    Protocols used: INFORMATION ONLY CALL - NO TRIAGE-A-[place]

## 2023-05-06 ENCOUNTER — HEALTH MAINTENANCE LETTER (OUTPATIENT)
Age: 73
End: 2023-05-06

## 2023-09-20 ENCOUNTER — TRANSFERRED RECORDS (OUTPATIENT)
Dept: HEALTH INFORMATION MANAGEMENT | Facility: OTHER | Age: 73
End: 2023-09-20

## 2023-09-20 LAB
ALT SERPL-CCNC: 19 U/L (ref 0–55)
AST SERPL-CCNC: 30 U/L (ref 5–40)
CHOLESTEROL (EXTERNAL): 191 MG/DL
CREATININE (EXTERNAL): 1 MG/DL (ref 0.5–1.5)
GFR ESTIMATED (EXTERNAL): 85 ML/MIN/1.73M2
GLUCOSE (EXTERNAL): 108 MG/DL (ref 70–180)
HDLC SERPL-MCNC: 68 MG/DL
LDL CHOLESTEROL CALCULATED (EXTERNAL): 80 MG/DL
POTASSIUM (EXTERNAL): 3.8 MMOL/L (ref 3.5–5)
TRIGLYCERIDES (EXTERNAL): 216 MG/DL (ref 0–199)
TSH SERPL-ACNC: 2.38 UIU/ML (ref 0.35–4.94)

## 2023-09-26 ENCOUNTER — TRANSFERRED RECORDS (OUTPATIENT)
Dept: HEALTH INFORMATION MANAGEMENT | Facility: OTHER | Age: 73
End: 2023-09-26

## 2024-02-05 DIAGNOSIS — K22.10 BARRETT'S ESOPHAGEAL ULCERATION: ICD-10-CM

## 2024-02-07 NOTE — TELEPHONE ENCOUNTER
Patient returned call and he verified his last name and . Pt states Dr. Lambert prescribed the omeprazole for heart burn following colonoscopy and EDG ([facility]). He had the prescription filled 22 and didn't start taking it until 6-8 weeks ago. He states it works great. The prescription was originally written 21 and is now . [facility]. He will be seen here if needed. Pt willing to wait for Dr. Lambert to return 24.    Aury Kincaid RN .............. 2024  4:24 PM

## 2024-02-07 NOTE — TELEPHONE ENCOUNTER
[facility] sent Rx request for the following:      Requested Prescriptions   Pending Prescriptions Disp Refills    omeprazole (PRILOSEC) 20 MG DR capsule [Pharmacy Med Name: OMEPRAZOLE 20MG DR CAPSULE] 90 capsule 4     Sig: TAKE 1 CAPSULE (20 MG) BY MOUTH DAILY       PPI Protocol Failed - 2/5/2024  2:17 PM        Failed - Recent (12 mo) or future (90 days) visit within the authorizing provider's specialty     The patient must have completed an in-person or virtual visit within the past 12 months or has a future visit scheduled within the next 90 days with the authorizing provider s specialty.  Urgent care and e-visits do not quality as an office visit for this protocol.         Last Prescription Date:   12/14/21  Last Fill Qty/Refills:         90 tab, R-4    Last Office Visit:              12/14/21 [facility]   Future Office visit:           None currently scheduled.     Pt has not been seen by a provider at [facility] since 2021. Call placed to pt to see if he has established elsewhere/assist in getting re-established with a provider here.     Message left for pt to return call to clinic.     Eunice Ferreira RN on 2/7/2024 at 11:43 AM

## 2024-02-12 NOTE — TELEPHONE ENCOUNTER
I sent in 90 days refill. He can [facility] for another [facility] or he will need to be seen in clinic for another prescription.

## 2024-05-29 ENCOUNTER — TRANSFERRED RECORDS (OUTPATIENT)
Dept: HEALTH INFORMATION MANAGEMENT | Facility: OTHER | Age: 74
End: 2024-05-29

## 2024-06-13 ENCOUNTER — OFFICE VISIT (OUTPATIENT)
Dept: FAMILY MEDICINE | Facility: OTHER | Age: 74
End: 2024-06-13
Attending: NURSE PRACTITIONER
Payer: MEDICARE

## 2024-06-13 VITALS
HEART RATE: 104 BPM | SYSTOLIC BLOOD PRESSURE: 120 MMHG | RESPIRATION RATE: 24 BRPM | BODY MASS INDEX: 19 KG/M2 | HEIGHT: 76 IN | DIASTOLIC BLOOD PRESSURE: 84 MMHG | WEIGHT: 156 LBS | OXYGEN SATURATION: 96 %

## 2024-06-13 DIAGNOSIS — Z00.00 ENCOUNTER FOR MEDICARE ANNUAL WELLNESS EXAM: Primary | ICD-10-CM

## 2024-06-13 DIAGNOSIS — R56.9 CONVULSIONS, UNSPECIFIED CONVULSION TYPE (H): ICD-10-CM

## 2024-06-13 DIAGNOSIS — N40.1 BENIGN LOCALIZED PROSTATIC HYPERPLASIA WITH LOWER URINARY TRACT SYMPTOMS (LUTS): ICD-10-CM

## 2024-06-13 DIAGNOSIS — F10.29 ALCOHOL DEPENDENCE WITH UNSPECIFIED ALCOHOL-INDUCED DISORDER (H): ICD-10-CM

## 2024-06-13 DIAGNOSIS — C67.9 MALIGNANT NEOPLASM OF URINARY BLADDER, UNSPECIFIED SITE (H): ICD-10-CM

## 2024-06-13 DIAGNOSIS — J44.9 CHRONIC OBSTRUCTIVE PULMONARY DISEASE, UNSPECIFIED COPD TYPE (H): ICD-10-CM

## 2024-06-13 DIAGNOSIS — Z78.9 SELF-CATHETERIZES URINARY BLADDER: ICD-10-CM

## 2024-06-13 DIAGNOSIS — K22.10 BARRETT'S ESOPHAGEAL ULCERATION: ICD-10-CM

## 2024-06-13 PROBLEM — N40.0 HYPERTROPHY OF PROSTATE WITHOUT URINARY OBSTRUCTION AND OTHER LOWER URINARY TRACT SYMPTOMS (LUTS): Status: RESOLVED | Noted: 2023-03-09 | Resolved: 2024-06-13

## 2024-06-13 PROCEDURE — 99214 OFFICE O/P EST MOD 30 MIN: CPT | Mod: 25 | Performed by: NURSE PRACTITIONER

## 2024-06-13 PROCEDURE — G0463 HOSPITAL OUTPT CLINIC VISIT: HCPCS | Performed by: NURSE PRACTITIONER

## 2024-06-13 PROCEDURE — G0439 PPPS, SUBSEQ VISIT: HCPCS | Performed by: NURSE PRACTITIONER

## 2024-06-13 SDOH — HEALTH STABILITY: PHYSICAL HEALTH: ON AVERAGE, HOW MANY DAYS PER WEEK DO YOU ENGAGE IN MODERATE TO STRENUOUS EXERCISE (LIKE A BRISK WALK)?: 2 DAYS

## 2024-06-13 ASSESSMENT — SOCIAL DETERMINANTS OF HEALTH (SDOH): HOW OFTEN DO YOU GET TOGETHER WITH FRIENDS OR RELATIVES?: ONCE A WEEK

## 2024-06-13 NOTE — PROGRESS NOTES
Preventive Care Visit  [facility]  TIMOTEO Seals CNP, Nurse Practitioner - Family  Jun 13, 2024      Assessment & Plan   Problem List Items Addressed This Visit          Nervous and Auditory    Convulsions, unspecified convulsion type (H)       Respiratory    Chronic obstructive pulmonary disease, unspecified COPD type (H)       Urinary    Malignant neoplasm of urinary bladder (H)    Benign localized prostatic hyperplasia with lower urinary tract symptoms (LUTS)       Other    Alcohol dependence with unspecified alcohol-induced disorder (H)     Other Visit Diagnoses       Encounter for Medicare annual wellness exam    -  Primary    Hou's esophageal ulceration        Relevant Medications    omeprazole (PRILOSEC) 20 MG DR capsule    Self-catheterizes urinary bladder               The records from [facility] that were available.  Diagnosis of convulsions, denies any recent concerns, not following with neurology.  COPD, continues to smoke daily, no interest in quitting.  No inhalers are currently being used. He did have fluticasone/salmeterol inhaler in the past ordered.   Bladder cancer with BPH/LUTS, managed with medications, self-catheterization twice daily, routine follow-up with urology through [facility].  Symptoms are stable  Alcohol dependence listed on diagnoses list through [facility] as an active problem.  Patient denies any alcohol use.  Hou's esophagitis, taking omeprazole, refill placed.  Last EGD and colonoscopy was 2020, he is due for colonoscopy again this year.  He will notify [facility] to get approval to have this completed here.    Patient has been advised of split billing requirements and indicates understanding: Yes       Nicotine/Tobacco Cessation  He reports that he has been smoking cigarettes. He started smoking about 52 years ago. He has a 52.5 pack-year smoking history. He has never used smokeless  tobacco.  Nicotine/Tobacco Cessation Plan  Information offered: Patient not interested at this time      Counseling  Appropriate preventive services were discussed with this patient, including applicable screening as appropriate for fall prevention, nutrition, physical activity, Tobacco-use cessation, weight loss and cognition.  Checklist reviewing preventive services available has been given to the patient.  Reviewed patient's diet, addressing concerns and/or questions.   He is at risk for lack of exercise and has been provided with information to increase physical activity for the benefit of his well-being.   The patient was instructed to see the dentist every 6 months.   Discussed possible causes of fatigue. Patient reported safety concerns were addressed today.      No follow-ups on file.      Oneil Graves is a 73 year old, presenting for the following:  Medicare Visit          Via the Health Maintenance questionnaire, the patient has reported the following services have been completed -Colonscopy: [facility] 2022-06-22, this information has been sent to the abstraction team.  Health Care Directive  Patient does not have a Health Care Directive or Living Will:     History of Present Illness       Hyperlipidemia:  He presents for follow up of hyperlipidemia.   He is taking medication to lower cholesterol. He is not having myalgia or other side effects to statin medications.    Reason for visit:  Heart Oscar consumes 1 sweetened beverage(s) daily. He exercises with enough effort to increase his heart rate 3 or less days per week. He is missing 1 dose(s) of medications per week.  He is not taking prescribed medications regularly due to remembering to take.    He presents to clinic today for medical wellness exam.  He gets his primary care through [facility] in [place] in [place].  Labs are all completed through there.  He does report upcoming visit in September for follow-up with  lab work.  [facility], currently on tamsulosin, finasteride, atorvastatin.  He does have a history of bladder cancer, initially diagnosed in 74, 7 years ago had further treatment.  He has been cancer free for 7 years.  Recent check at [facility] was stable.  He does have some ability to urinate in the mornings, self caths twice daily.  He has been told he does not drink enough fluids.  Does have reflux, takes omeprazole, this is not being managed through [facility] and he would like to have a refill of this today.    Dx of dermatitis/psoriasis.  [facility] indicates topical treatment, he is not using this currently.          6/13/2024   General Health   How would you rate your overall physical health? (!) FAIR   Feel stress (tense, anxious, or unable to sleep) Not at all         6/13/2024   Nutrition   Diet: Regular (no restrictions)         6/13/2024   Exercise   Days per week of moderate/strenous exercise 2 days   (!) EXERCISE CONCERN      6/13/2024   Social Factors   Frequency of gathering with friends or relatives Once a week   Worry food won't last until get money to buy more No   Food not last or not have enough money for food? No   Do you have housing?  Yes   Are you worried about losing your housing? No   Lack of transportation? No   Unable to get utilities (heat,electricity)? No         6/13/2024   Fall Risk   Fallen 2 or more times in the past year? Yes   Trouble with walking or balance? Yes           6/13/2024   Activities of Daily Living- Home Safety   Needs help with the following daily activites None of the above   Safety concerns in the home Poor lighting         6/13/2024   Dental   Dentist two times every year? (!) NO         6/13/2024   Hearing Screening   Hearing concerns? None of the above         6/13/2024   Driving Risk Screening   Patient/family members have concerns about driving No         6/13/2024   General  "Alertness/Fatigue Screening   Have you been more tired than usual lately? (!) YES         6/13/2024   Urinary Incontinence Screening   Bothered by leaking urine in past 6 months No         6/13/2024   TB Screening   Were you born outside of the [place]? No         Today's PHQ-2 Score:       6/13/2024     2:47 PM   PHQ-2 ( 1999 [employer])   Q1: Little interest or pleasure in doing things 1   Q2: Feeling down, depressed or hopeless 0   PHQ-2 Score 1   Q1: Little interest or pleasure in doing things Several days   Q2: Feeling down, depressed or hopeless Not at all   PHQ-2 Score 1           6/13/2024   Substance Use   If I could quit smoking, I would Completely agree   I want to quit somking, worry about health affects Completely agree   Willing to make a plan to quit smoking Neutral   Willing to cut down before quitting Neutral   Alcohol more than 3/day or more than 7/wk No   Do you have a current opioid prescription? No   How severe/bad is pain from 1 to 10? 2/10   Do you use any other substances recreationally? No     Social History     Tobacco Use    Smoking status: Every Day     Current packs/day: 1.00     Average packs/day: 1 pack/day for 52.5 years (52.5 ttl pk-yrs)     Types: Cigarettes     Start date: 1972    Smokeless tobacco: Never   Vaping Use    Vaping status: Never Used   Substance Use Topics    Alcohol use: Yes    Drug use: Not Currently     Comment: Former: \"POT and anything I could get my hands on.  I would stick things in my arms.\"       ASCVD Risk   The ASCVD Risk score (Renato DK, et al., 2019) failed to calculate for the following reasons:    Cannot find a previous HDL lab    Cannot find a previous total cholesterol lab            Reviewed and updated as needed this visit by Provider   Tobacco  Allergies  Meds  Problems  Med Hx  Surg Hx  Fam Hx            Past Medical History:   Diagnosis Date    Constipation 06/14/2024    Meningitis 06/14/2024    Oct 16, 2012 Entered By: KHADIJAH BULLOCK" Comment: While in [facility].      Pneumothorax 06/13/2019    Primary spontaneous pneumothorax 03/09/2023    Oct 16, 2012 Entered By: KHADIJAH BULLOCK Comment: Treated with chest tube       Past Surgical History:   Procedure Laterality Date    COLONOSCOPY N/A 11/12/2020    7 tubular adenomas, follow up 3 years, 11/12/2023    ESOPHAGOSCOPY, GASTROSCOPY, DUODENOSCOPY (EGD), COMBINED N/A 11/12/2020    ALONSO's follow up 3 years, 11/12/2023     Patient Active Problem List   Diagnosis    Other viral warts    Restless leg    Venous insufficiency    Malignant neoplasm of urinary bladder (H)    Convulsions, unspecified convulsion type (H)    Benign localized prostatic hyperplasia with lower urinary tract symptoms (LUTS)    Chronic obstructive pulmonary disease, unspecified COPD type (H)    Alcohol dependence with unspecified alcohol-induced disorder (H)    Abnormal liver enzymes    Asymptomatic varicose veins    Low back pain    Seborrheic dermatitis, unspecified    Multiple pulmonary nodules    Osteoarthrosis    Other psoriasis    Other retention of urine    Personal history of malignant neoplasm of bladder    Mixed hyperlipidemia     Past Surgical History:   Procedure Laterality Date    COLONOSCOPY N/A 11/12/2020    7 tubular adenomas, follow up 3 years, 11/12/2023    ESOPHAGOSCOPY, GASTROSCOPY, DUODENOSCOPY (EGD), COMBINED N/A 11/12/2020    ALONSO's follow up 3 years, 11/12/2023       Social History     Tobacco Use    Smoking status: Every Day     Current packs/day: 1.00     Average packs/day: 1 pack/day for 52.5 years (52.5 ttl pk-yrs)     Types: Cigarettes     Start date: 1972    Smokeless tobacco: Never   Substance Use Topics    Alcohol use: Yes     History reviewed. No pertinent family history.      Current Outpatient Medications   Medication Sig Dispense Refill    atorvastatin (LIPITOR) 20 MG tablet 20 mg      finasteride (PROSCAR) 5 MG tablet 5 mg      omeprazole (PRILOSEC) 20 MG DR capsule Take 1 capsule (20 mg) by  "mouth daily 90 capsule 3    tamsulosin (FLOMAX) 0.4 MG capsule 0.8 mg       No Known Allergies  Current providers sharing in care for this patient include:  Patient Care Team:  No Ref-Primary, Physician as PCP - Felix Ortez MD as Assigned PCP    The following health maintenance items are reviewed in Epic and correct as of today:  Health Maintenance   Topic Date Due    LIPID  Never done    ADVANCE CARE PLANNING  Never done    HEPATITIS C SCREENING  Never done    RSV VACCINE (Pregnancy & 60+) (1 - 1-dose 60+ series) Never done    AORTIC ANEURYSM SCREENING (SYSTEM ASSIGNED)  Never done    LUNG CANCER SCREENING  04/08/2020    NICOTINE/TOBACCO CESSATION COUNSELING Q 1 YR  12/14/2022    COVID-19 Vaccine (5 - 2023-24 season) 09/01/2023    COLORECTAL CANCER SCREENING  11/12/2023    GLUCOSE  12/12/2024    MEDICARE ANNUAL WELLNESS VISIT  06/13/2025    FALL RISK ASSESSMENT  06/13/2025    DTAP/TDAP/TD IMMUNIZATION (5 - Td or Tdap) 08/22/2026    PHQ-2 (once per calendar year)  Completed    INFLUENZA VACCINE  Completed    Pneumococcal Vaccine: 65+ Years  Completed    ZOSTER IMMUNIZATION  Completed    IPV IMMUNIZATION  Aged Out    HPV IMMUNIZATION  Aged Out    MENINGITIS IMMUNIZATION  Aged Out    RSV MONOCLONAL ANTIBODY  Aged Out            Objective    Exam  /84   Pulse 104   Resp 24   Ht 1.93 m (6' 4\")   Wt 70.8 kg (156 lb)   SpO2 96%   BMI 18.99 kg/m     Estimated body mass index is 18.99 kg/m  as calculated from the following:    Height as of this encounter: 1.93 m (6' 4\").    Weight as of this encounter: 70.8 kg (156 lb).    Physical Exam  GENERAL: alert and no distress  EYES: Eyes grossly normal to inspection, PERRL and conjunctivae and sclerae normal  HENT: ear canals and TM's normal, nose and mouth without ulcers or lesions  NECK: no adenopathy, no asymmetry, masses, or scars  RESP: lungs clear to auscultation - no rales, rhonchi or wheezes  CV: regular rate and rhythm, normal S1 S2, no S3 or " S4, no murmur, click or rub, no peripheral edema  ABDOMEN: soft, nontender, no hepatosplenomegaly, no masses and bowel sounds normal  MS: no gross musculoskeletal defects noted, no edema  SKIN: no suspicious lesions or rashes  NEURO: Normal strength and tone, mentation intact and speech normal  PSYCH: mentation appears normal, affect normal/bright        6/13/2024   Mini Cog   Clock Draw Score 2 Normal   3 Item Recall 3 objects recalled   Mini Cog Total Score 5              Signed Electronically by: TIMOTEO Seals CNP

## 2024-06-13 NOTE — PATIENT INSTRUCTIONS
"[facility] know [facility]  Have records sent to [facility] for upcoming labs    Patient Education   Preventive Care Advice   This is general advice we often give to help people stay healthy. Your care team may have specific advice just for you. Please talk to your care team about your own preventive care needs.  Lifestyle  Exercise at least 150 minutes each week (30 minutes a day, 5 days a week).  Do muscle strengthening activities 2 days a week. These help control your weight and prevent disease.  No smoking.  Wear sunscreen to prevent skin cancer.  Have your home tested for radon every 2 to 5 years. Radon is a colorless, odorless gas that can harm your lungs. To learn more, go to www.health.[facility].mn.us and search for \"Radon in Homes.\"  Keep guns unloaded and locked up in a safe place like a safe or gun vault, or, use a gun lock and hide the keys. Always lock away bullets separately. To learn more, visit Reffpedia.mn.gov and search for \"safe gun storage.\"  Nutrition  Eat 5 or more servings of fruits and vegetables each day.  Try wheat bread, brown rice and whole grain pasta (instead of white bread, rice, and pasta).  Get enough calcium and vitamin D. Check the label on foods and aim for 100% of the RDA (recommended daily allowance).  Regular exams  Have a dental exam and cleaning every 6 months.  See your health care team every year to talk about:  Any changes in your health.  Any medicines your care team has prescribed.  Preventive care, family planning, and ways to prevent chronic diseases.  Shots (vaccines)   HPV shots (up to age 26), if you've never had them before.  Hepatitis B shots (up to age 59), if you've never had them before.  COVID-19 shot: Get this shot when it's due.  Flu shot: Get a flu shot every year.  Tetanus shot: Get a tetanus shot every 10 years.  Pneumococcal, hepatitis A, and RSV shots: Ask your care team if you need these based on your risk.  Shingles shot (for age 50 and " up).  General health tests  Diabetes screening:  Starting at age 35, Get screened for diabetes at least every 3 years.  If you are younger than age 35, ask your care team if you should be screened for diabetes.  Cholesterol test: At age 39, start having a cholesterol test every 5 years, or more often if advised.  Bone density scan (DEXA): At age 50, ask your care team if you should have this scan for osteoporosis (brittle bones).  Hepatitis C: Get tested at least once in your life.  Abdominal aortic aneurysm screening: Talk to your doctor about having this screening if you:  Have ever smoked; and  Are biologically male; and  Are between the ages of 65 and 75.  STIs (sexually transmitted infections)  Before age 24: Ask your care team if you should be screened for STIs.  After age 24: Get screened for STIs if you're at risk. You are at risk for STIs (including HIV) if:  You are sexually active with more than one person.  You don't use condoms every time.  You or a partner was diagnosed with a sexually transmitted infection.  If you are at risk for HIV, ask about PrEP medicine to prevent HIV.  Get tested for HIV at least once in your life, whether you are at risk for HIV or not.  Cancer screening tests  Cervical cancer screening: If you have a cervix, begin getting regular cervical cancer screening tests at age 21. Most people who have regular screenings with normal results can stop after age 65. Talk about this with your provider.  Breast cancer scan (mammogram): If you've ever had breasts, begin having regular mammograms starting at age 40. This is a scan to check for breast cancer.  Colon cancer screening: It is important to start screening for colon cancer at age 45.  Have a colonoscopy test every 10 years (or more often if you're at risk) Or, ask your provider about stool tests like a FIT test every year or Cologuard test every 3 years.  To learn more about your testing options, visit:  www.[employer]/985761.pdf.  For help making a decision, visit: julio.majo/qw46559.  Prostate cancer screening test: If you have a prostate and are age 55 to 69, ask your provider if you would benefit from a yearly prostate cancer screening test.  Lung cancer screening: If you are a current or former smoker age 50 to 80, ask your care team if ongoing lung cancer screenings are right for you.  For informational purposes only. Not to replace the advice of your health care provider. Copyright   2023 [facility] [employer]. All rights reserved. Clinically reviewed by the [facility]. [employer] 720279 - REV 04/24.  Learning About Activities of Daily Living  What are activities of daily living?     Activities of daily living (ADLs) are the basic self-care tasks you do every day. These include eating, bathing, dressing, and moving around.  As you age, and if you have health problems, you may find that it's harder to do some of these tasks. If so, your doctor can suggest ideas that may help.  To measure what kind of help you may need, your doctor will ask how well you are able to do ADLs. Let your doctor know if there are any tasks that you are having trouble doing. This is an important first step to getting help. And when you have the help you need, you can stay as independent as possible.  How will a doctor assess your ADLs?  Asking about ADLs is part of a routine health checkup your doctor will likely do as you age. Your health check might be done in a doctor's office, in your home, or at a hospital. The goal is to find out if you are having any problems that could make it hard to care for yourself or that make it unsafe for you to be on your own.  To measure your ADLs, your doctor will ask how hard it is for you to do routine tasks. Your doctor may also want to know if you have changed the way you do a task because of a health problem. Your doctor may watch how you:  Walk back and forth.  Keep  your balance while you stand or walk.  Move from sitting to standing or from a bed to a chair.  Button or unbutton a shirt or sweater.  Remove and put on your shoes.  It's common to feel a little worried or anxious if you find you can't do all the things you used to be able to do. Talking with your doctor about ADLs is a way to make sure you're as safe as possible and able to care for yourself as well as you can. You may want to bring a caregiver, friend, or family member to your checkup. They can help you talk to your doctor.  Follow-up care is a key part of your treatment and safety. Be sure to make and go to all appointments, and call your doctor if you are having problems. It's also a good idea to know your test results and keep a list of the medicines you take.  Current as of: October 24, 2023               Content Version: 14.0    0254-2117 [employer].   Care instructions adapted under license by your healthcare professional. If you have questions about a medical condition or this instruction, always ask your healthcare professional. [employer] disclaims any warranty or liability for your use of this information.      Preventing Falls: Care Instructions  Injuries and health problems such as trouble walking or poor eyesight can increase your risk of falling. So can some medicines. But there are things you can do to help prevent falls. You can exercise to get stronger. You can also arrange your home to make it safer.    Talk to your doctor about the medicines you take. Ask if any of them increase the risk of falls and whether they can be changed or stopped.   Try to exercise regularly. It can help improve your strength and balance. This can help lower your risk of falling.     Practice fall safety and prevention.    Wear low-heeled shoes that fit well and give your feet good support. Talk to your doctor if you have foot problems that make this hard.  Carry a cellphone or wear a  "medical alert device that you can use to call for help.  Use stepladders instead of chairs to reach high objects. Don't climb if you're at risk for falls. Ask for help, if needed.  Wear the correct eyeglasses, if you need them.    Make your home safer.    Remove rugs, cords, clutter, and furniture from walkways.  Keep your house well lit. Use night-lights in hallways and bathrooms.  Install and use sturdy handrails on stairways.  Wear nonskid footwear, even inside. Don't walk barefoot or in socks without shoes.    Be safe outside.    Use handrails, curb cuts, and ramps whenever possible.  Keep your hands free by using a shoulder bag or backpack.  Try to walk in well-lit areas. Watch out for uneven ground, changes in pavement, and debris.  Be careful in the winter. Walk on the grass or gravel when sidewalks are slippery. Use de-icer on steps and walkways. Add non-slip devices to shoes.    Put grab bars and nonskid mats in your shower or tub and near the toilet. Try to use a shower chair or bath bench when bathing.   Get into a tub or shower by putting in your weaker leg first. Get out with your strong side first. Have a phone or medical alert device in the bathroom with you.   Where can you learn more?  Go to https://www.[employer].net/patiented  Enter G117 in the search box to learn more about \"Preventing Falls: Care Instructions.\"  Current as of: July 17, 2023               Content Version: 14.0    9122-4669 [employer].   Care instructions adapted under license by your healthcare professional. If you have questions about a medical condition or this instruction, always ask your healthcare professional. [employer] disclaims any warranty or liability for your use of this information.      Learning About Sleeping Well  What does sleeping well mean?     Sleeping well means getting enough sleep to feel good and stay healthy. How much sleep is enough varies among people.  The number of hours " you sleep and how you feel when you wake up are both important. If you do not feel refreshed, you probably need more sleep. Another sign of not getting enough sleep is feeling tired during the day.  Experts recommend that adults get at least 7 or more hours of sleep per day. Children and older adults need more sleep.  Why is getting enough sleep important?  Getting enough quality sleep is a basic part of good health. When your sleep suffers, your physical health, mood, and your thoughts can suffer too. You may find yourself feeling more grumpy or stressed. Not getting enough sleep also can lead to serious problems, including injury, accidents, anxiety, and depression.  What might cause poor sleeping?  Many things can cause sleep problems, including:  Changes to your sleep schedule.  Stress. Stress can be caused by fear about a single event, such as giving a speech. Or you may have ongoing stress, such as worry about work or school.  Depression, anxiety, and other mental or emotional conditions.  Changes in your sleep habits or surroundings. This includes changes that happen where you sleep, such as noise, light, or sleeping in a different bed. It also includes changes in your sleep pattern, such as having jet lag or working a late shift.  Health problems, such as pain, breathing problems, and restless legs syndrome.  Lack of regular exercise.  Using alcohol, nicotine, or caffeine before bed.  How can you help yourself?  Here are some tips that may help you sleep more soundly and wake up feeling more refreshed.  Your sleeping area   Use your bedroom only for sleeping and sex. A bit of light reading may help you fall asleep. But if it doesn't, do your reading elsewhere in the house. Try not to use your TV, computer, smartphone, or tablet while you are in bed.  Be sure your bed is big enough to stretch out comfortably, especially if you have a sleep partner.  Keep your bedroom quiet, dark, and cool. Use curtains,  "blinds, or a sleep mask to block out light. To block out noise, use earplugs, soothing music, or a \"white noise\" machine.  Your evening and bedtime routine   Create a relaxing bedtime routine. You might want to take a warm shower or bath, or listen to soothing music.  Go to bed at the same time every night. And get up at the same time every morning, even if you feel tired.  What to avoid   Limit caffeine (coffee, tea, caffeinated sodas) during the day, and don't have any for at least 6 hours before bedtime.  Avoid drinking alcohol before bedtime. Alcohol can cause you to wake up more often during the night.  Try not to smoke or use tobacco, especially in the evening. Nicotine can keep you awake.  Limit naps during the day, especially close to bedtime.  Avoid lying in bed awake for too long. If you can't fall asleep or if you wake up in the middle of the night and can't get back to sleep within about 20 minutes, get out of bed and go to another room until you feel sleepy.  Avoid taking medicine right before bed that may keep you awake or make you feel hyper or energized. Your doctor can tell you if your medicine may do this and if you can take it earlier in the day.  If you can't sleep   Imagine yourself in a peaceful, pleasant scene. Focus on the details and feelings of being in a place that is relaxing.  Get up and do a quiet or boring activity until you feel sleepy.  Avoid drinking any liquids before going to bed to help prevent waking up often to use the bathroom.  Where can you learn more?  Go to https://www.[employer].net/patiented  Enter J942 in the search box to learn more about \"Learning About Sleeping Well.\"  Current as of: July 10, 2023               Content Version: 14.0    9696-4012 [employer].   Care instructions adapted under license by your healthcare professional. If you have questions about a medical condition or this instruction, always ask your healthcare professional. [employer] " Incorporated disclaims any warranty or liability for your use of this information.

## 2024-06-14 PROBLEM — L40.8 OTHER PSORIASIS: Status: ACTIVE | Noted: 2024-06-14

## 2024-06-14 PROBLEM — I83.90 ASYMPTOMATIC VARICOSE VEINS: Status: ACTIVE | Noted: 2024-06-14

## 2024-06-14 PROBLEM — R33.8 OTHER RETENTION OF URINE: Status: ACTIVE | Noted: 2024-06-14

## 2024-06-14 PROBLEM — L21.9 SEBORRHEIC DERMATITIS, UNSPECIFIED: Status: ACTIVE | Noted: 2024-06-14

## 2024-06-14 PROBLEM — M54.50 LOW BACK PAIN: Status: ACTIVE | Noted: 2024-06-14

## 2024-06-14 PROBLEM — G03.9 MENINGITIS: Status: ACTIVE | Noted: 2024-06-14

## 2024-06-14 PROBLEM — J93.11 PRIMARY SPONTANEOUS PNEUMOTHORAX: Status: RESOLVED | Noted: 2023-03-09 | Resolved: 2024-06-14

## 2024-06-14 PROBLEM — R91.8 MULTIPLE PULMONARY NODULES: Status: ACTIVE | Noted: 2024-06-14

## 2024-06-14 PROBLEM — J44.9 CHRONIC OBSTRUCTIVE PULMONARY DISEASE, UNSPECIFIED COPD TYPE (H): Status: ACTIVE | Noted: 2024-06-14

## 2024-06-14 PROBLEM — E78.2 MIXED HYPERLIPIDEMIA: Status: ACTIVE | Noted: 2024-06-14

## 2024-06-14 PROBLEM — K59.00 CONSTIPATION: Status: RESOLVED | Noted: 2024-06-14 | Resolved: 2024-06-14

## 2024-06-14 PROBLEM — K59.00 CONSTIPATION: Status: ACTIVE | Noted: 2024-06-14

## 2024-06-14 PROBLEM — M19.90 OSTEOARTHROSIS: Status: ACTIVE | Noted: 2024-06-14

## 2024-06-14 PROBLEM — Z85.51 PERSONAL HISTORY OF MALIGNANT NEOPLASM OF BLADDER: Status: ACTIVE | Noted: 2024-06-14

## 2024-06-14 PROBLEM — G03.9 MENINGITIS: Status: RESOLVED | Noted: 2024-06-14 | Resolved: 2024-06-14

## 2024-06-14 PROBLEM — R74.8 ABNORMAL LIVER ENZYMES: Status: ACTIVE | Noted: 2024-06-14

## 2024-06-14 PROBLEM — J93.9 PNEUMOTHORAX: Status: RESOLVED | Noted: 2019-06-13 | Resolved: 2024-06-14

## 2024-06-14 PROBLEM — F10.29 ALCOHOL DEPENDENCE WITH UNSPECIFIED ALCOHOL-INDUCED DISORDER (H): Status: ACTIVE | Noted: 2024-06-14

## 2024-06-14 PROBLEM — E78.5 HYPERLIPIDEMIA: Status: RESOLVED | Noted: 2021-12-14 | Resolved: 2024-06-14

## 2024-07-03 ENCOUNTER — APPOINTMENT (OUTPATIENT)
Dept: GENERAL RADIOLOGY | Facility: OTHER | Age: 74
End: 2024-07-03
Attending: STUDENT IN AN ORGANIZED HEALTH CARE EDUCATION/TRAINING PROGRAM
Payer: MEDICARE

## 2024-07-03 ENCOUNTER — HOSPITAL ENCOUNTER (EMERGENCY)
Facility: OTHER | Age: 74
Discharge: HOME | End: 2024-07-03
Attending: STUDENT IN AN ORGANIZED HEALTH CARE EDUCATION/TRAINING PROGRAM | Admitting: STUDENT IN AN ORGANIZED HEALTH CARE EDUCATION/TRAINING PROGRAM
Payer: MEDICARE

## 2024-07-03 VITALS
TEMPERATURE: 97.9 F | WEIGHT: 156 LBS | BODY MASS INDEX: 19 KG/M2 | OXYGEN SATURATION: 93 % | HEART RATE: 94 BPM | SYSTOLIC BLOOD PRESSURE: 136 MMHG | RESPIRATION RATE: 18 BRPM | HEIGHT: 76 IN | DIASTOLIC BLOOD PRESSURE: 87 MMHG

## 2024-07-03 DIAGNOSIS — S60.419A ABRASION OF FINGER OF LEFT HAND, INITIAL ENCOUNTER: ICD-10-CM

## 2024-07-03 DIAGNOSIS — S60.419A ABRASION OF FINGER OF RIGHT HAND, INITIAL ENCOUNTER: ICD-10-CM

## 2024-07-03 PROCEDURE — 250N000009 HC RX 250: Performed by: STUDENT IN AN ORGANIZED HEALTH CARE EDUCATION/TRAINING PROGRAM

## 2024-07-03 PROCEDURE — 73140 X-RAY EXAM OF FINGER(S): CPT | Mod: LT

## 2024-07-03 PROCEDURE — 99283 EMERGENCY DEPT VISIT LOW MDM: CPT | Performed by: STUDENT IN AN ORGANIZED HEALTH CARE EDUCATION/TRAINING PROGRAM

## 2024-07-03 RX ORDER — GINSENG 100 MG
500 CAPSULE ORAL ONCE
Status: COMPLETED | OUTPATIENT
Start: 2024-07-03 | End: 2024-07-03

## 2024-07-03 RX ADMIN — BACITRACIN 1 G: 500 OINTMENT TOPICAL at 13:43

## 2024-07-03 ASSESSMENT — COLUMBIA-SUICIDE SEVERITY RATING SCALE - C-SSRS
1. IN THE PAST MONTH, HAVE YOU WISHED YOU WERE DEAD OR WISHED YOU COULD GO TO SLEEP AND NOT WAKE UP?: NO
6. HAVE YOU EVER DONE ANYTHING, STARTED TO DO ANYTHING, OR PREPARED TO DO ANYTHING TO END YOUR LIFE?: NO
2. HAVE YOU ACTUALLY HAD ANY THOUGHTS OF KILLING YOURSELF IN THE PAST MONTH?: NO

## 2024-07-03 ASSESSMENT — ACTIVITIES OF DAILY LIVING (ADL): ADLS_ACUITY_SCORE: 33

## 2024-07-03 NOTE — ED PROVIDER NOTES
History     Chief Complaint   Patient presents with    Laceration     Lacerations to 6 fingers after a fall last night at 1800       Avinash Wray is a 73 year old male presenting with finger wounds.  Yesterday evening he was taking out his garbage can and he pushed it over the curb when he accidentally lost his balance and landed on the asphalt hitting his hands on the asphalt.  He is skin tears with some deeper involvement over his knuckles and the left third finger PIP joint actually had exposed bone he believes.  He thoroughly rinsed these immediately afterwards.  Tetanus is up-to-date per immunization in EMR.  He is able to flex and extend fingers without issue.  Denies any other injury or pain or head trauma.      No Known Allergies    Patient Active Problem List    Diagnosis Date Noted    Chronic obstructive pulmonary disease, unspecified COPD type (H) 06/14/2024     Priority: Medium    Alcohol dependence with unspecified alcohol-induced disorder (H) 06/14/2024     Priority: Medium    Abnormal liver enzymes 06/14/2024     Priority: Medium    Asymptomatic varicose veins 06/14/2024     Priority: Medium    Low back pain 06/14/2024     Priority: Medium    Seborrheic dermatitis, unspecified 06/14/2024     Priority: Medium    Multiple pulmonary nodules 06/14/2024     Priority: Medium     Jan 07, 2019 Entered By: ISABELA CHAPPELL Comment: Lung nodule tracking begun 10/10/2017.      Osteoarthrosis 06/14/2024     Priority: Medium     Oct 16, 2012 Entered By: KHADIJAH BULLOCK Comment: Neck, shoulders, right wrist and bilateral hands      Other psoriasis 06/14/2024     Priority: Medium    Other retention of urine 06/14/2024     Priority: Medium    Personal history of malignant neoplasm of bladder 06/14/2024     Priority: Medium    Mixed hyperlipidemia 06/14/2024     Priority: Medium    Convulsions, unspecified convulsion type (H) 06/13/2024     Priority: Medium    Benign localized prostatic hyperplasia with lower  "urinary tract symptoms (LUTS) 06/13/2024     Priority: Medium    Malignant neoplasm of urinary bladder (H) 03/09/2023     Priority: Medium    Restless leg 12/14/2021     Priority: Medium    Venous insufficiency 01/11/2014     Priority: Medium    Other viral warts 05/18/2007     Priority: Medium       Past Medical History:   Diagnosis Date    Constipation 06/14/2024    Meningitis 06/14/2024    Pneumothorax 06/13/2019    Primary spontaneous pneumothorax 03/09/2023       Past Surgical History:   Procedure Laterality Date    COLONOSCOPY N/A 11/12/2020    7 tubular adenomas, follow up 3 years, 11/12/2023    ESOPHAGOSCOPY, GASTROSCOPY, DUODENOSCOPY (EGD), COMBINED N/A 11/12/2020    ALONSO's follow up 3 years, 11/12/2023       No family history on file.    Social History     Tobacco Use    Smoking status: Every Day     Current packs/day: 1.00     Average packs/day: 1 pack/day for 52.5 years (52.5 ttl pk-yrs)     Types: Cigarettes     Start date: 1972    Smokeless tobacco: Never   Vaping Use    Vaping status: Never Used   Substance Use Topics    Alcohol use: Yes    Drug use: Not Currently     Comment: Former: \"POT and anything I could get my hands on.  I would stick things in my arms.\"       Medications:    atorvastatin (LIPITOR) 20 MG tablet  finasteride (PROSCAR) 5 MG tablet  omeprazole (PRILOSEC) 20 MG DR capsule  tamsulosin (FLOMAX) 0.4 MG capsule        Review of Systems: See HPI for pertinent negatives and positives. All other systems reviewed and found to be negative.    Physical Exam   /87   Pulse 94   Temp 97.9  F (36.6  C) (Tympanic)   Resp 18   Ht 1.93 m (6' 4\")   Wt 70.8 kg (156 lb)   SpO2 93%   BMI 18.99 kg/m       General: awake, comfortable  HEENT: atraumatic  Respiratory: normal effort  Cardiovascular: Hands appear well-perfused  Extremities: no deformities, edema, left bilateral third PIP joint with tenderness to palpation  MSK: flexion/extension of all digits bilaterally  Skin: Approximately " 1 cm skin tear with loss of subcutaneous fat over third dorsal PIP joint, right dorsal finger skin abrasion approximately 2 x 1 cm over middle phalanx, several other more superficial small abrasion wounds over dorsal fingers. See below image.  Neuro: alert, distal sensation intact        ED Course           Results for orders placed or performed during the hospital encounter of 07/03/24 (from the past 24 hour(s))   XR Finger Port Left G/E 2 Views    Narrative    Exam: XR FINGER PORT LEFT G/E 2 VIEWS     History:Male, age 73 years, PIP joint injury - ?fx    Comparison:  No relevant prior imaging.    Technique: Three views are submitted.    Findings: Bones are normally mineralized. No evidence of acute or  subacute fracture.  No evidence of dislocation.           Impression    Impression:  No evidence of acute or subacute bony abnormality.    AP COLON MD         SYSTEM ID:  [id number]       Medications   bacitracin ointment 1 g (has no administration in time range)           Procedures    Assessments & Plan (with Medical Decision Making)     I have reviewed the nursing notes.    Mechanical ground-level fall with dorsal finger skin abrasions with 1 particular deeper wound over the left third finger dorsal PIP joint laceration.  Tetanus up-to-date.  X-ray personally interpreted without sign of fracture.  Radiology read returns with no evidence of acute bony injury.  Wounds were thoroughly washed yesterday when injury occurred.  Wounds are being treated with bacitracin and covered with sterile dressing.  General surgery referral placed to have patient follow-up to make sure the the wounds are healing without need for skin graft, particularly deeper wound over the left 3rd finger. Discharged home with below plan.    I have reviewed the findings, diagnosis, plan and need for follow up with the patient.    Patient instructions:   Keep wounds clean and dry.  Change dressing daily looking for signs of infection which  include redness of the surrounding skin, white yellow-green drainage, swelling, or significant worsening pain.    Apply bacitracin antibiotic ointment (can pickup over-the-counter) over the wounds underneath sterile dressing with dressing changes.    Follow-up with general surgery outpatient appointment to ensure wounds are healing appropriately.    Please review attached instructions including reasons to return to the emergency department.     New Prescriptions    No medications on file       Final diagnoses:   Abrasion of finger of left hand, initial encounter   Abrasion of finger of right hand, initial encounter       7/3/2024   [facility]       Carl Rodríguez MD  07/03/24 3523

## 2024-07-03 NOTE — DISCHARGE INSTRUCTIONS
Keep wounds clean and dry.  Change dressing daily looking for signs of infection which include redness of the surrounding skin, white yellow-green drainage, swelling, or significant worsening pain.    Apply bacitracin antibiotic ointment (can pickup over-the-counter) over the wounds underneath sterile dressing with dressing changes.    Follow-up with general surgery outpatient appointment to ensure wounds are healing appropriately.    Please review attached instructions including reasons to return to the emergency department.

## 2024-07-03 NOTE — ED TRIAGE NOTES
"Patient being evaluated today after suffering several lacerations last night to his fingers. He was taking out the garbage when he tripped and fell. Patient denies hitting his head or any LOC. He cleansed his wounds and applied ABX ointment. Possible closure needed to bilateral 3rd digits. Clean dressing applied in triage. Bleeding controlled. /87   Pulse 94   Temp 97.9  F (36.6  C) (Tympanic)   Resp 18   Ht 1.93 m (6' 4\")   Wt 70.8 kg (156 lb)   SpO2 93%   BMI 18.99 kg/m         Triage Assessment (Adult)       Row Name 07/03/24 1159          Triage Assessment    Airway WDL WDL        Respiratory WDL    Respiratory WDL WDL        Skin Circulation/Temperature WDL    Skin Circulation/Temperature WDL X  lacerations        Cardiac WDL    Cardiac WDL WDL        Peripheral/Neurovascular WDL    Peripheral Neurovascular WDL WDL        Cognitive/Neuro/Behavioral WDL    Cognitive/Neuro/Behavioral WDL WDL                     "

## 2024-07-03 NOTE — ED NOTES
Wounds dressed with bacitracin, sterile gauze, secured with paper tape. Two of the smaller wounds covered with adhesive bandages. Pt verbalized understanding of wound care instructions, denies further questions at this time.

## 2024-07-10 ENCOUNTER — OFFICE VISIT (OUTPATIENT)
Dept: SURGERY | Facility: OTHER | Age: 74
End: 2024-07-10
Attending: SURGERY
Payer: MEDICARE

## 2024-07-10 VITALS
OXYGEN SATURATION: 97 % | SYSTOLIC BLOOD PRESSURE: 132 MMHG | RESPIRATION RATE: 18 BRPM | WEIGHT: 159.4 LBS | DIASTOLIC BLOOD PRESSURE: 64 MMHG | BODY MASS INDEX: 19.4 KG/M2 | TEMPERATURE: 97.6 F | HEART RATE: 74 BPM

## 2024-07-10 DIAGNOSIS — S60.419A ABRASION OF FINGER OF LEFT HAND, INITIAL ENCOUNTER: ICD-10-CM

## 2024-07-10 DIAGNOSIS — S60.419A ABRASION OF FINGER OF RIGHT HAND, INITIAL ENCOUNTER: ICD-10-CM

## 2024-07-10 PROCEDURE — G0463 HOSPITAL OUTPT CLINIC VISIT: HCPCS | Performed by: SURGERY

## 2024-07-10 PROCEDURE — 99212 OFFICE O/P EST SF 10 MIN: CPT | Performed by: SURGERY

## 2024-07-10 RX ORDER — CEPHALEXIN 500 MG/1
500 CAPSULE ORAL 2 TIMES DAILY
Qty: 20 CAPSULE | Refills: 0 | Status: SHIPPED | OUTPATIENT
Start: 2024-07-10 | End: 2024-07-20

## 2024-07-10 ASSESSMENT — PAIN SCALES - GENERAL: PAINLEVEL: NO PAIN (0)

## 2024-07-10 NOTE — PROGRESS NOTES
Patient presents for post surgical visit after ER visit for hand lacerations on 7/3/2024. Patient has done well.  Patient has not been using any of the bacitracin.  He now has dry scabbed over lesions.  The middle finger has some erythema and warmth.    /64 (BP Location: Right arm, Patient Position: Sitting, Cuff Size: Adult Regular)   Pulse 74   Temp 97.6  F (36.4  C) (Temporal)   Resp 18   Wt 72.3 kg (159 lb 6.4 oz)   SpO2 97%   BMI 19.40 kg/m      General: NAD, pleasant and cooperative with exam and interview.  Hand: Left hand shows skin lesions.  There is slight erythema and warmth of the middle finger.  Psychiatry: awake, alert and oriented. Appropriate affect.    Assessment/Plan:  73-year-old with tremor unsteady gait and fall resulting in finger lacerations.  Slight erythema concerning for cellulitis.      Start Keflex  Follow-up if worsening  Use bacitracin or Vaseline to moisturize tissue defects.    Pj Person MD on 7/10/2024 at 11:35 AM

## 2024-07-10 NOTE — NURSING NOTE
"Chief Complaint   Patient presents with    RECHECK     Referral from ER    left hand abrasion          Medication reconciliation completed.    FOOD SECURITY SCREENING QUESTIONS:    The next two questions are to help us understand your food security.  If you are feeling you need any assistance in this area, we have resources available to support you today.    Hunger Vital Signs:  Within the past 12 months we worried whether our food would run out before we got money to buy more. Never  Within the past 12 months the food we bought just didn't last and we didn't have money to get more. Never    Initial /64 (BP Location: Right arm, Patient Position: Sitting, Cuff Size: Adult Regular)   Pulse 74   Temp 97.6  F (36.4  C) (Temporal)   Resp 18   Wt 72.3 kg (159 lb 6.4 oz)   SpO2 97%   BMI 19.40 kg/m   Estimated body mass index is 19.4 kg/m  as calculated from the following:    Height as of 7/3/24: 1.93 m (6' 4\").    Weight as of this encounter: 72.3 kg (159 lb 6.4 oz).       Yen Charles LPN .......  7/10/2024  11:23 AM    "

## 2024-10-17 DIAGNOSIS — R73.03 PRE-DIABETES: Primary | ICD-10-CM

## 2024-10-24 ENCOUNTER — HOSPITAL ENCOUNTER (OUTPATIENT)
Dept: CT IMAGING | Facility: OTHER | Age: 74
Discharge: HOME | End: 2024-10-24
Attending: NURSE PRACTITIONER | Admitting: NURSE PRACTITIONER
Payer: MEDICARE

## 2024-10-24 ENCOUNTER — LAB (OUTPATIENT)
Dept: LAB | Facility: OTHER | Age: 74
End: 2024-10-24
Payer: COMMERCIAL

## 2024-10-24 DIAGNOSIS — Z01.89 ENCOUNTER FOR OTHER SPECIFIED SPECIAL EXAMINATIONS: ICD-10-CM

## 2024-10-24 DIAGNOSIS — R73.03 PRE-DIABETES: ICD-10-CM

## 2024-10-24 LAB
CREAT SERPL-MCNC: 0.92 MG/DL (ref 0.67–1.17)
EGFRCR SERPLBLD CKD-EPI 2021: 88 ML/MIN/1.73M2

## 2024-10-24 PROCEDURE — 82565 ASSAY OF CREATININE: CPT | Mod: ZL

## 2024-10-24 PROCEDURE — 36415 COLL VENOUS BLD VENIPUNCTURE: CPT | Mod: ZL

## 2024-10-24 PROCEDURE — 250N000011 HC RX IP 250 OP 636

## 2024-10-24 PROCEDURE — 250N000009 HC RX 250

## 2024-10-24 PROCEDURE — 74177 CT ABD & PELVIS W/CONTRAST: CPT

## 2024-10-24 RX ORDER — IOPAMIDOL 755 MG/ML
91 INJECTION, SOLUTION INTRAVASCULAR ONCE
Status: COMPLETED | OUTPATIENT
Start: 2024-10-24 | End: 2024-10-24

## 2024-10-24 RX ADMIN — SODIUM CHLORIDE 60 ML: 9 INJECTION, SOLUTION INTRAVENOUS at 16:02

## 2024-10-24 RX ADMIN — IOPAMIDOL 91 ML: 755 INJECTION, SOLUTION INTRAVENOUS at 15:59

## 2024-11-11 ENCOUNTER — HOSPITAL ENCOUNTER (OUTPATIENT)
Dept: CT IMAGING | Facility: OTHER | Age: 74
Discharge: HOME | End: 2024-11-11
Attending: NURSE PRACTITIONER | Admitting: NURSE PRACTITIONER
Payer: COMMERCIAL

## 2024-11-11 DIAGNOSIS — Z87.891 PERSONAL HISTORY OF NICOTINE DEPENDENCE: ICD-10-CM

## 2024-11-11 DIAGNOSIS — Z12.2 SCREENING FOR MALIGNANT NEOPLASM OF RESPIRATORY ORGAN: ICD-10-CM

## 2024-11-11 PROCEDURE — 71271 CT THORAX LUNG CANCER SCR C-: CPT

## 2025-03-04 ENCOUNTER — HOSPITAL ENCOUNTER (OUTPATIENT)
Dept: CT IMAGING | Facility: OTHER | Age: 75
Discharge: HOME | End: 2025-03-04
Payer: COMMERCIAL

## 2025-03-04 DIAGNOSIS — R91.1 SOLITARY PULMONARY NODULE: ICD-10-CM

## 2025-03-04 DIAGNOSIS — Z09 FOLLOW-UP EXAM, 3-6 MONTHS SINCE PREVIOUS EXAM: ICD-10-CM

## 2025-03-04 PROCEDURE — 71250 CT THORAX DX C-: CPT

## 2025-03-21 ENCOUNTER — APPOINTMENT (OUTPATIENT)
Dept: CT IMAGING | Facility: OTHER | Age: 75
End: 2025-03-21
Attending: EMERGENCY MEDICINE
Payer: COMMERCIAL

## 2025-03-21 ENCOUNTER — HOSPITAL ENCOUNTER (INPATIENT)
Facility: OTHER | Age: 75
LOS: 4 days | Discharge: HOME | End: 2025-03-25
Attending: EMERGENCY MEDICINE | Admitting: INTERNAL MEDICINE
Payer: COMMERCIAL

## 2025-03-21 DIAGNOSIS — F10.11 HISTORY OF ALCOHOL ABUSE: ICD-10-CM

## 2025-03-21 DIAGNOSIS — E78.2 MIXED HYPERLIPIDEMIA: ICD-10-CM

## 2025-03-21 DIAGNOSIS — C67.9 MALIGNANT NEOPLASM OF URINARY BLADDER, UNSPECIFIED SITE (H): ICD-10-CM

## 2025-03-21 DIAGNOSIS — E83.42 HYPOMAGNESEMIA: ICD-10-CM

## 2025-03-21 DIAGNOSIS — N39.0 SEPSIS DUE TO URINARY TRACT INFECTION (H): ICD-10-CM

## 2025-03-21 DIAGNOSIS — A41.9 UNSPECIFIED SEPTICEMIA(038.9) (H): Primary | ICD-10-CM

## 2025-03-21 DIAGNOSIS — N39.0 URINARY TRACT INFECTION WITHOUT HEMATURIA, SITE UNSPECIFIED: ICD-10-CM

## 2025-03-21 DIAGNOSIS — N30.01 ACUTE CYSTITIS WITH HEMATURIA: ICD-10-CM

## 2025-03-21 DIAGNOSIS — A41.9 SEPSIS DUE TO URINARY TRACT INFECTION (H): ICD-10-CM

## 2025-03-21 DIAGNOSIS — N40.1 BENIGN LOCALIZED PROSTATIC HYPERPLASIA WITH LOWER URINARY TRACT SYMPTOMS (LUTS): ICD-10-CM

## 2025-03-21 DIAGNOSIS — R65.20 SEVERE SEPSIS WITH ACUTE ORGAN DYSFUNCTION (H): ICD-10-CM

## 2025-03-21 DIAGNOSIS — A41.9 SEVERE SEPSIS WITH ACUTE ORGAN DYSFUNCTION (H): ICD-10-CM

## 2025-03-21 LAB
ALBUMIN SERPL BCG-MCNC: 4.4 G/DL (ref 3.5–5.2)
ALBUMIN UR-MCNC: 200 MG/DL
ALP SERPL-CCNC: 91 U/L (ref 40–150)
ALT SERPL W P-5'-P-CCNC: 125 U/L (ref 0–70)
AMMONIA PLAS-SCNC: 15 UMOL/L (ref 16–60)
ANION GAP SERPL CALCULATED.3IONS-SCNC: 16 MMOL/L (ref 7–15)
APPEARANCE UR: ABNORMAL
AST SERPL W P-5'-P-CCNC: 188 U/L (ref 0–45)
BACTERIA #/AREA URNS HPF: ABNORMAL /HPF
BASOPHILS # BLD AUTO: 0 10E3/UL (ref 0–0.2)
BASOPHILS NFR BLD AUTO: 1 %
BILIRUB SERPL-MCNC: 2.9 MG/DL
BILIRUB UR QL STRIP: ABNORMAL
BUN SERPL-MCNC: 35.7 MG/DL (ref 8–23)
CALCIUM SERPL-MCNC: 10.2 MG/DL (ref 8.8–10.4)
CHLORIDE SERPL-SCNC: 91 MMOL/L (ref 98–107)
CK SERPL-CCNC: 70 U/L (ref 39–308)
COLOR UR AUTO: ABNORMAL
CREAT SERPL-MCNC: 1.11 MG/DL (ref 0.67–1.17)
EGFRCR SERPLBLD CKD-EPI 2021: 70 ML/MIN/1.73M2
EOSINOPHIL # BLD AUTO: 0 10E3/UL (ref 0–0.7)
EOSINOPHIL NFR BLD AUTO: 1 %
ERYTHROCYTE [DISTWIDTH] IN BLOOD BY AUTOMATED COUNT: 14.6 % (ref 10–15)
ETHANOL SERPL-MCNC: <0.01 G/DL
GLUCOSE SERPL-MCNC: 116 MG/DL (ref 70–99)
GLUCOSE UR STRIP-MCNC: NEGATIVE MG/DL
HCO3 SERPL-SCNC: 31 MMOL/L (ref 22–29)
HCT VFR BLD AUTO: 41.1 % (ref 40–53)
HGB BLD-MCNC: 14.9 G/DL (ref 13.3–17.7)
HGB UR QL STRIP: ABNORMAL
HOLD SPECIMEN: NORMAL
HYALINE CASTS: 10 /LPF
IMM GRANULOCYTES # BLD: 0 10E3/UL
IMM GRANULOCYTES NFR BLD: 0 %
INR PPP: 0.99 (ref 0.85–1.15)
KETONES UR STRIP-MCNC: ABNORMAL MG/DL
LACTATE SERPL-SCNC: 2 MMOL/L (ref 0.7–2)
LACTATE SERPL-SCNC: 3.1 MMOL/L (ref 0.7–2)
LEUKOCYTE ESTERASE UR QL STRIP: ABNORMAL
LYMPHOCYTES # BLD AUTO: 1.8 10E3/UL (ref 0.8–5.3)
LYMPHOCYTES NFR BLD AUTO: 23 %
MAGNESIUM SERPL-MCNC: 0.8 MG/DL (ref 1.7–2.3)
MCH RBC QN AUTO: 36.2 PG (ref 26.5–33)
MCHC RBC AUTO-ENTMCNC: 36.3 G/DL (ref 31.5–36.5)
MCV RBC AUTO: 100 FL (ref 78–100)
MONOCYTES # BLD AUTO: 0.6 10E3/UL (ref 0–1.3)
MONOCYTES NFR BLD AUTO: 8 %
MUCOUS THREADS #/AREA URNS LPF: PRESENT /LPF
NEUTROPHILS # BLD AUTO: 5.2 10E3/UL (ref 1.6–8.3)
NEUTROPHILS NFR BLD AUTO: 67 %
NITRATE UR QL: NEGATIVE
NRBC # BLD AUTO: 0 10E3/UL
NRBC BLD AUTO-RTO: 0 /100
PH UR STRIP: 6 [PH] (ref 5–9)
PLATELET # BLD AUTO: 183 10E3/UL (ref 150–450)
POTASSIUM SERPL-SCNC: 3.6 MMOL/L (ref 3.4–5.3)
PROCALCITONIN SERPL IA-MCNC: 0.21 NG/ML
PROT SERPL-MCNC: 7.4 G/DL (ref 6.4–8.3)
RBC # BLD AUTO: 4.12 10E6/UL (ref 4.4–5.9)
RBC URINE: 63 /HPF
SODIUM SERPL-SCNC: 138 MMOL/L (ref 135–145)
SP GR UR STRIP: 1.02 (ref 1–1.03)
TROPONIN T SERPL HS-MCNC: 25 NG/L
TROPONIN T SERPL HS-MCNC: 26 NG/L
TROPONIN T SERPL HS-MCNC: 30 NG/L
UROBILINOGEN UR STRIP-MCNC: >12 MG/DL
WBC # BLD AUTO: 7.7 10E3/UL (ref 4–11)
WBC URINE: >182 /HPF

## 2025-03-21 PROCEDURE — 258N000003 HC RX IP 258 OP 636: Performed by: FAMILY MEDICINE

## 2025-03-21 PROCEDURE — 74177 CT ABD & PELVIS W/CONTRAST: CPT

## 2025-03-21 PROCEDURE — 36415 COLL VENOUS BLD VENIPUNCTURE: CPT | Performed by: FAMILY MEDICINE

## 2025-03-21 PROCEDURE — 36415 COLL VENOUS BLD VENIPUNCTURE: CPT | Performed by: EMERGENCY MEDICINE

## 2025-03-21 PROCEDURE — 120N000001 HC R&B MED SURG/OB

## 2025-03-21 PROCEDURE — 258N000003 HC RX IP 258 OP 636: Performed by: EMERGENCY MEDICINE

## 2025-03-21 PROCEDURE — 84145 PROCALCITONIN (PCT): CPT | Performed by: EMERGENCY MEDICINE

## 2025-03-21 PROCEDURE — 96368 THER/DIAG CONCURRENT INF: CPT | Performed by: EMERGENCY MEDICINE

## 2025-03-21 PROCEDURE — 85610 PROTHROMBIN TIME: CPT | Performed by: EMERGENCY MEDICINE

## 2025-03-21 PROCEDURE — 82550 ASSAY OF CK (CPK): CPT | Performed by: EMERGENCY MEDICINE

## 2025-03-21 PROCEDURE — 80053 COMPREHEN METABOLIC PANEL: CPT | Performed by: EMERGENCY MEDICINE

## 2025-03-21 PROCEDURE — 85025 COMPLETE CBC W/AUTO DIFF WBC: CPT | Performed by: EMERGENCY MEDICINE

## 2025-03-21 PROCEDURE — 81001 URINALYSIS AUTO W/SCOPE: CPT | Performed by: EMERGENCY MEDICINE

## 2025-03-21 PROCEDURE — 96366 THER/PROPH/DIAG IV INF ADDON: CPT | Performed by: EMERGENCY MEDICINE

## 2025-03-21 PROCEDURE — 87088 URINE BACTERIA CULTURE: CPT | Performed by: EMERGENCY MEDICINE

## 2025-03-21 PROCEDURE — 84484 ASSAY OF TROPONIN QUANT: CPT | Performed by: FAMILY MEDICINE

## 2025-03-21 PROCEDURE — 93010 ELECTROCARDIOGRAM REPORT: CPT | Performed by: INTERNAL MEDICINE

## 2025-03-21 PROCEDURE — 93005 ELECTROCARDIOGRAM TRACING: CPT | Performed by: EMERGENCY MEDICINE

## 2025-03-21 PROCEDURE — 96361 HYDRATE IV INFUSION ADD-ON: CPT | Performed by: EMERGENCY MEDICINE

## 2025-03-21 PROCEDURE — 250N000011 HC RX IP 250 OP 636: Performed by: EMERGENCY MEDICINE

## 2025-03-21 PROCEDURE — 87186 SC STD MICRODIL/AGAR DIL: CPT | Performed by: EMERGENCY MEDICINE

## 2025-03-21 PROCEDURE — 83735 ASSAY OF MAGNESIUM: CPT | Performed by: EMERGENCY MEDICINE

## 2025-03-21 PROCEDURE — 250N000009 HC RX 250: Performed by: EMERGENCY MEDICINE

## 2025-03-21 PROCEDURE — 82077 ASSAY SPEC XCP UR&BREATH IA: CPT | Performed by: EMERGENCY MEDICINE

## 2025-03-21 PROCEDURE — 99221 1ST HOSP IP/OBS SF/LOW 40: CPT | Performed by: INTERNAL MEDICINE

## 2025-03-21 PROCEDURE — 84484 ASSAY OF TROPONIN QUANT: CPT | Performed by: EMERGENCY MEDICINE

## 2025-03-21 PROCEDURE — 71275 CT ANGIOGRAPHY CHEST: CPT

## 2025-03-21 PROCEDURE — 87040 BLOOD CULTURE FOR BACTERIA: CPT | Performed by: FAMILY MEDICINE

## 2025-03-21 PROCEDURE — 83605 ASSAY OF LACTIC ACID: CPT | Performed by: EMERGENCY MEDICINE

## 2025-03-21 PROCEDURE — 70450 CT HEAD/BRAIN W/O DYE: CPT

## 2025-03-21 PROCEDURE — 99285 EMERGENCY DEPT VISIT HI MDM: CPT | Mod: 25 | Performed by: EMERGENCY MEDICINE

## 2025-03-21 PROCEDURE — 99285 EMERGENCY DEPT VISIT HI MDM: CPT | Performed by: EMERGENCY MEDICINE

## 2025-03-21 PROCEDURE — 82140 ASSAY OF AMMONIA: CPT | Performed by: EMERGENCY MEDICINE

## 2025-03-21 PROCEDURE — 96365 THER/PROPH/DIAG IV INF INIT: CPT | Mod: XU | Performed by: EMERGENCY MEDICINE

## 2025-03-21 RX ORDER — ATORVASTATIN CALCIUM 20 MG/1
20 TABLET, FILM COATED ORAL DAILY
Status: DISCONTINUED | OUTPATIENT
Start: 2025-03-22 | End: 2025-03-25 | Stop reason: HOSPADM

## 2025-03-21 RX ORDER — PANTOPRAZOLE SODIUM 40 MG/1
40 TABLET, DELAYED RELEASE ORAL
Status: DISCONTINUED | OUTPATIENT
Start: 2025-03-22 | End: 2025-03-25 | Stop reason: HOSPADM

## 2025-03-21 RX ORDER — FINASTERIDE 5 MG/1
5 TABLET, FILM COATED ORAL DAILY
Status: DISCONTINUED | OUTPATIENT
Start: 2025-03-22 | End: 2025-03-25 | Stop reason: HOSPADM

## 2025-03-21 RX ORDER — TAMSULOSIN HYDROCHLORIDE 0.4 MG/1
0.8 CAPSULE ORAL EVERY EVENING
Status: DISCONTINUED | OUTPATIENT
Start: 2025-03-21 | End: 2025-03-25 | Stop reason: HOSPADM

## 2025-03-21 RX ORDER — CEFTRIAXONE 1 G/1
1 INJECTION, POWDER, FOR SOLUTION INTRAMUSCULAR; INTRAVENOUS EVERY 24 HOURS
Status: DISCONTINUED | OUTPATIENT
Start: 2025-03-22 | End: 2025-03-25 | Stop reason: HOSPADM

## 2025-03-21 RX ORDER — AMOXICILLIN 250 MG
1 CAPSULE ORAL 2 TIMES DAILY PRN
Status: DISCONTINUED | OUTPATIENT
Start: 2025-03-21 | End: 2025-03-25 | Stop reason: HOSPADM

## 2025-03-21 RX ORDER — IOPAMIDOL 755 MG/ML
75 INJECTION, SOLUTION INTRAVASCULAR ONCE
Status: COMPLETED | OUTPATIENT
Start: 2025-03-21 | End: 2025-03-21

## 2025-03-21 RX ORDER — CEFTRIAXONE 1 G/1
1 INJECTION, POWDER, FOR SOLUTION INTRAMUSCULAR; INTRAVENOUS ONCE
Status: COMPLETED | OUTPATIENT
Start: 2025-03-21 | End: 2025-03-21

## 2025-03-21 RX ORDER — CALCIUM CARBONATE 500 MG/1
1000 TABLET, CHEWABLE ORAL 4 TIMES DAILY PRN
Status: DISCONTINUED | OUTPATIENT
Start: 2025-03-21 | End: 2025-03-25 | Stop reason: HOSPADM

## 2025-03-21 RX ORDER — AMOXICILLIN 250 MG
2 CAPSULE ORAL 2 TIMES DAILY PRN
Status: DISCONTINUED | OUTPATIENT
Start: 2025-03-21 | End: 2025-03-25 | Stop reason: HOSPADM

## 2025-03-21 RX ORDER — DOCUSATE SODIUM 100 MG/1
100 CAPSULE, LIQUID FILLED ORAL 2 TIMES DAILY
Status: DISCONTINUED | OUTPATIENT
Start: 2025-03-21 | End: 2025-03-25 | Stop reason: HOSPADM

## 2025-03-21 RX ORDER — LIDOCAINE 40 MG/G
CREAM TOPICAL
Status: DISCONTINUED | OUTPATIENT
Start: 2025-03-21 | End: 2025-03-25 | Stop reason: HOSPADM

## 2025-03-21 RX ORDER — MAGNESIUM SULFATE HEPTAHYDRATE 40 MG/ML
4 INJECTION, SOLUTION INTRAVENOUS EVERY 4 HOURS
Status: COMPLETED | OUTPATIENT
Start: 2025-03-21 | End: 2025-03-21

## 2025-03-21 RX ADMIN — SODIUM CHLORIDE 500 ML: 0.9 INJECTION, SOLUTION INTRAVENOUS at 16:58

## 2025-03-21 RX ADMIN — CEFTRIAXONE SODIUM 1 G: 1 INJECTION, POWDER, FOR SOLUTION INTRAMUSCULAR; INTRAVENOUS at 19:25

## 2025-03-21 RX ADMIN — SODIUM CHLORIDE 500 ML: 0.9 INJECTION, SOLUTION INTRAVENOUS at 19:30

## 2025-03-21 RX ADMIN — SODIUM CHLORIDE 80 ML: 9 INJECTION, SOLUTION INTRAVENOUS at 18:59

## 2025-03-21 RX ADMIN — MAGNESIUM SULFATE HEPTAHYDRATE 4 G: 4 INJECTION, SOLUTION INTRAVENOUS at 21:46

## 2025-03-21 RX ADMIN — SODIUM CHLORIDE 1000 ML: 0.9 INJECTION, SOLUTION INTRAVENOUS at 19:30

## 2025-03-21 RX ADMIN — IOPAMIDOL 75 ML: 755 INJECTION, SOLUTION INTRAVENOUS at 18:59

## 2025-03-21 RX ADMIN — MAGNESIUM SULFATE HEPTAHYDRATE 4 G: 4 INJECTION, SOLUTION INTRAVENOUS at 17:40

## 2025-03-21 ASSESSMENT — ENCOUNTER SYMPTOMS
PHOTOPHOBIA: 0
NECK PAIN: 0
GASTROINTESTINAL NEGATIVE: 1
PSYCHIATRIC NEGATIVE: 1
FATIGUE: 1
CARDIOVASCULAR NEGATIVE: 1
RESPIRATORY NEGATIVE: 1
MUSCULOSKELETAL NEGATIVE: 1
UNEXPECTED WEIGHT CHANGE: 0
NECK STIFFNESS: 0
HEMATOLOGIC/LYMPHATIC NEGATIVE: 1
EYES NEGATIVE: 1
ALLERGIC/IMMUNOLOGIC NEGATIVE: 1
ENDOCRINE NEGATIVE: 1
NEUROLOGICAL NEGATIVE: 1
APPETITE CHANGE: 0

## 2025-03-21 ASSESSMENT — ACTIVITIES OF DAILY LIVING (ADL)
ADLS_ACUITY_SCORE: 41
ADLS_ACUITY_SCORE: 34
ADLS_ACUITY_SCORE: 41
ADLS_ACUITY_SCORE: 41

## 2025-03-21 ASSESSMENT — COLUMBIA-SUICIDE SEVERITY RATING SCALE - C-SSRS
2. HAVE YOU ACTUALLY HAD ANY THOUGHTS OF KILLING YOURSELF IN THE PAST MONTH?: NO
1. IN THE PAST MONTH, HAVE YOU WISHED YOU WERE DEAD OR WISHED YOU COULD GO TO SLEEP AND NOT WAKE UP?: NO
6. HAVE YOU EVER DONE ANYTHING, STARTED TO DO ANYTHING, OR PREPARED TO DO ANYTHING TO END YOUR LIFE?: NO

## 2025-03-21 NOTE — ED TRIAGE NOTES
Pt arrives VIA EMS with concerns of failure to thrive. Pt states he has felt generally weak for about one year and does not know why. Pts wife called him in.   BP (!) 122/101   Pulse 103   Temp 97.7  F (36.5  C) (Tympanic)   Resp 18   SpO2 97%         Triage Assessment (Adult)       Row Name 03/21/25 3343          Triage Assessment    Airway WDL WDL        Respiratory WDL    Respiratory WDL WDL        Skin Circulation/Temperature WDL    Skin Circulation/Temperature WDL WDL        Cardiac WDL    Cardiac WDL WDL        Peripheral/Neurovascular WDL    Peripheral Neurovascular WDL WDL        Cognitive/Neuro/Behavioral WDL    Cognitive/Neuro/Behavioral WDL WDL

## 2025-03-21 NOTE — ED PROVIDER NOTES
History     Chief Complaint   Patient presents with    Generalized Weakness     HPI  Avinash Wray is a 74 year old male who presents today with complaints of generalized weakness.  Patient states symptoms present since 2019 when he reports that he had a seizure.  Has had a workup for his symptoms without a diagnosis.  Patient also states in addition to weakness he has also had difficulty with balance.  Again no one's been able to elucidate the etiology of his balance problems.  What patient's wife called for him to be seen.  Patient denies any additional complaints at this time.    Allergies:  No Known Allergies    Problem List:    Patient Active Problem List    Diagnosis Date Noted    Chronic obstructive pulmonary disease, unspecified COPD type (H) 06/14/2024     Priority: Medium    Alcohol dependence with unspecified alcohol-induced disorder (H) 06/14/2024     Priority: Medium    Abnormal liver enzymes 06/14/2024     Priority: Medium    Asymptomatic varicose veins 06/14/2024     Priority: Medium    Low back pain 06/14/2024     Priority: Medium    Seborrheic dermatitis, unspecified 06/14/2024     Priority: Medium    Multiple pulmonary nodules 06/14/2024     Priority: Medium     Jan 07, 2019 Entered By: ISABELA CHAPPELL Comment: Lung nodule tracking begun 10/10/2017.      Osteoarthrosis 06/14/2024     Priority: Medium     Oct 16, 2012 Entered By: KHADIJAH BULLOCK Comment: Neck, shoulders, right wrist and bilateral hands      Other psoriasis 06/14/2024     Priority: Medium    Other retention of urine 06/14/2024     Priority: Medium    Personal history of malignant neoplasm of bladder 06/14/2024     Priority: Medium    Mixed hyperlipidemia 06/14/2024     Priority: Medium    Convulsions, unspecified convulsion type (H) 06/13/2024     Priority: Medium    Benign localized prostatic hyperplasia with lower urinary tract symptoms (LUTS) 06/13/2024     Priority: Medium    Malignant neoplasm of urinary bladder (H)  "03/09/2023     Priority: Medium    Restless leg 12/14/2021     Priority: Medium    Venous insufficiency 01/11/2014     Priority: Medium    Other viral warts 05/18/2007     Priority: Medium        Past Medical History:    Past Medical History:   Diagnosis Date    Constipation 06/14/2024    Meningitis 06/14/2024    Pneumothorax 06/13/2019    Primary spontaneous pneumothorax 03/09/2023       Past Surgical History:    Past Surgical History:   Procedure Laterality Date    COLONOSCOPY N/A 11/12/2020    7 tubular adenomas, follow up 3 years, 11/12/2023    ESOPHAGOSCOPY, GASTROSCOPY, DUODENOSCOPY (EGD), COMBINED N/A 11/12/2020    ALONSO's follow up 3 years, 11/12/2023       Family History:    No family history on file.    Social History:  Marital Status:   [4]  Social History     Tobacco Use    Smoking status: Every Day     Current packs/day: 1.00     Average packs/day: 1 pack/day for 53.2 years (53.2 ttl pk-yrs)     Types: Cigarettes     Start date: 1972    Smokeless tobacco: Never   Vaping Use    Vaping status: Never Used   Substance Use Topics    Alcohol use: Yes    Drug use: Not Currently     Comment: Former: \"POT and anything I could get my hands on.  I would stick things in my arms.\"        Medications:    atorvastatin (LIPITOR) 20 MG tablet  finasteride (PROSCAR) 5 MG tablet  omeprazole (PRILOSEC) 20 MG DR capsule  tamsulosin (FLOMAX) 0.4 MG capsule          Review of Systems   Constitutional:  Positive for fatigue. Negative for appetite change and unexpected weight change.   HENT: Negative.     Eyes: Negative.  Negative for photophobia.   Respiratory: Negative.     Cardiovascular: Negative.    Gastrointestinal: Negative.    Endocrine: Negative.    Genitourinary: Negative.    Musculoskeletal: Negative.  Negative for neck pain and neck stiffness.   Skin: Negative.    Allergic/Immunologic: Negative.    Neurological: Negative.    Hematological: Negative.    Psychiatric/Behavioral: Negative.         Physical " Exam   BP: (!) 122/101  Pulse: 103  Temp: 97.7  F (36.5  C)  Resp: 18  SpO2: 97 %      Physical Exam  Constitutional:       General: He is not in acute distress.     Appearance: Normal appearance. He is not ill-appearing.      Comments: Patient thin in appearance   HENT:      Head: Normocephalic.   Cardiovascular:      Rate and Rhythm: Normal rate.   Pulmonary:      Effort: Pulmonary effort is normal.   Abdominal:      General: Abdomen is flat.   Musculoskeletal:         General: Normal range of motion.      Cervical back: Normal range of motion.   Skin:     General: Skin is warm.      Capillary Refill: Capillary refill takes less than 2 seconds.   Neurological:      General: No focal deficit present.      Mental Status: He is alert and oriented to person, place, and time.   Psychiatric:         Mood and Affect: Mood normal.         Behavior: Behavior normal.         ED Course     ED Course as of 03/22/25 1548   Fri Mar 21, 2025   1911 Signed out to Dr. Bloom - CT pending. If non-acute findings noted, plan admit for Mg replacement and UTI     Procedures         EKG -sinus tachycardia with premature atrial complexes         Results for orders placed or performed during the hospital encounter of 03/21/25 (from the past 24 hours)   [place] Draw    Narrative    The following orders were created for panel order [place] Draw.  Procedure                               Abnormality         Status                     ---------                               -----------         ------                     Extra Blue Top Tube[[id number]]                             Final result               Extra Red Top Tube[[id number]]                              Final result               Extra Green Top (Lithiu...[[id number]]                      Final result               Extra Purple Top Tube[[id number]]                           Final result               Extra Green Top (Lithiu...[[id number]]                      Final result                  Please view results for these tests on the individual orders.   Extra Blue Top Tube   Result Value Ref Range    Hold Specimen x    Extra Red Top Tube   Result Value Ref Range    Hold Specimen x    Extra Green Top (Lithium Heparin) Tube   Result Value Ref Range    Hold Specimen x    Extra Purple Top Tube   Result Value Ref Range    Hold Specimen x    Extra Green Top (Lithium Heparin) ON ICE   Result Value Ref Range    Hold Specimen x    CBC with platelets differential    Narrative    The following orders were created for panel order CBC with platelets differential.  Procedure                               Abnormality         Status                     ---------                               -----------         ------                     CBC with platelets and ...[[id number]]  Abnormal            Final result                 Please view results for these tests on the individual orders.   INR   Result Value Ref Range    INR 0.99 0.85 - 1.15   Comprehensive metabolic panel   Result Value Ref Range    Sodium 138 135 - 145 mmol/L    Potassium 3.6 3.4 - 5.3 mmol/L    Carbon Dioxide (CO2) 31 (H) 22 - 29 mmol/L    Anion Gap 16 (H) 7 - 15 mmol/L    Urea Nitrogen 35.7 (H) 8.0 - 23.0 mg/dL    Creatinine 1.11 0.67 - 1.17 mg/dL    GFR Estimate 70 >60 mL/min/1.73m2    Calcium 10.2 8.8 - 10.4 mg/dL    Chloride 91 (L) 98 - 107 mmol/L    Glucose 116 (H) 70 - 99 mg/dL    Alkaline Phosphatase 91 40 - 150 U/L     (H) 0 - 45 U/L     (H) 0 - 70 U/L    Protein Total 7.4 6.4 - 8.3 g/dL    Albumin 4.4 3.5 - 5.2 g/dL    Bilirubin Total 2.9 (H) <=1.2 mg/dL   Lactic acid whole blood with 1x repeat in 2 hr when >2   Result Value Ref Range    Lactic Acid, Initial 3.1 (H) 0.7 - 2.0 mmol/L   Procalcitonin   Result Value Ref Range    Procalcitonin 0.21 <0.50 ng/mL   Troponin T, High Sensitivity   Result Value Ref Range    Troponin T, High Sensitivity 30 (H) <=22 ng/L   Magnesium   Result Value Ref Range    Magnesium 0.8 (LL) 1.7  - 2.3 mg/dL   CBC with platelets and differential   Result Value Ref Range    WBC Count 7.7 4.0 - 11.0 10e3/uL    RBC Count 4.12 (L) 4.40 - 5.90 10e6/uL    Hemoglobin 14.9 13.3 - 17.7 g/dL    Hematocrit 41.1 40.0 - 53.0 %     78 - 100 fL    MCH 36.2 (H) 26.5 - 33.0 pg    MCHC 36.3 31.5 - 36.5 g/dL    RDW 14.6 10.0 - 15.0 %    Platelet Count 183 150 - 450 10e3/uL    % Neutrophils 67 %    % Lymphocytes 23 %    % Monocytes 8 %    % Eosinophils 1 %    % Basophils 1 %    % Immature Granulocytes 0 %    NRBCs per 100 WBC 0 <1 /100    Absolute Neutrophils 5.2 1.6 - 8.3 10e3/uL    Absolute Lymphocytes 1.8 0.8 - 5.3 10e3/uL    Absolute Monocytes 0.6 0.0 - 1.3 10e3/uL    Absolute Eosinophils 0.0 0.0 - 0.7 10e3/uL    Absolute Basophils 0.0 0.0 - 0.2 10e3/uL    Absolute Immature Granulocytes 0.0 <=0.4 10e3/uL    Absolute NRBCs 0.0 10e3/uL   Ammonia (on ice)   Result Value Ref Range    Ammonia 15 (L) 16 - 60 umol/L   UA with Microscopic reflex to Culture    Specimen: Urine, Catheter   Result Value Ref Range    Color Urine Orange (A) Colorless, Straw, Light Yellow, Yellow    Appearance Urine Slightly Cloudy (A) Clear    Glucose Urine Negative Negative mg/dL    Bilirubin Urine Small (A) Negative    Ketones Urine Trace (A) Negative mg/dL    Specific Gravity Urine 1.022 1.000 - 1.030    Blood Urine Moderate (A) Negative    pH Urine 6.0 5.0 - 9.0    Protein Albumin Urine 200 (A) Negative mg/dL    Urobilinogen Urine >12.0 (A) Normal, 2.0 mg/dL    Nitrite Urine Negative Negative    Leukocyte Esterase Urine Large (A) Negative    Bacteria Urine Many (A) None Seen /HPF    Mucus Urine Present (A) None Seen /LPF    RBC Urine 63 (H) <=2 /HPF    WBC Urine >182 (H) <=5 /HPF    Hyaline Casts Urine 10 (H) <=2 /LPF    Narrative    Urine Culture ordered based on laboratory criteria       Medications   sodium chloride 0.9% BOLUS 500 mL (has no administration in time range)       Assessments & Plan (with Medical Decision Making)              New Prescriptions    No medications on file       Final diagnoses:   Sepsis due to urinary tract infection (H)   Hypomagnesemia   History of alcohol abuse       3/21/2025   [facility] AND [facility]       Keysha Carson MD  03/22/25 0560

## 2025-03-22 ENCOUNTER — APPOINTMENT (OUTPATIENT)
Dept: PHYSICAL THERAPY | Facility: OTHER | Age: 75
End: 2025-03-22
Attending: INTERNAL MEDICINE
Payer: COMMERCIAL

## 2025-03-22 ENCOUNTER — APPOINTMENT (OUTPATIENT)
Dept: OCCUPATIONAL THERAPY | Facility: OTHER | Age: 75
End: 2025-03-22
Attending: INTERNAL MEDICINE
Payer: COMMERCIAL

## 2025-03-22 PROBLEM — E78.2 MIXED HYPERLIPIDEMIA: Status: ACTIVE | Noted: 2024-06-14

## 2025-03-22 PROBLEM — R65.20 SEVERE SEPSIS WITH ACUTE ORGAN DYSFUNCTION (H): Status: ACTIVE | Noted: 2025-03-21

## 2025-03-22 PROBLEM — F10.29 ALCOHOL DEPENDENCE WITH UNSPECIFIED ALCOHOL-INDUCED DISORDER (H): Status: ACTIVE | Noted: 2024-06-14

## 2025-03-22 PROBLEM — E87.6 HYPOKALEMIA: Status: ACTIVE | Noted: 2025-03-22

## 2025-03-22 PROBLEM — Z85.51 PERSONAL HISTORY OF MALIGNANT NEOPLASM OF BLADDER: Status: ACTIVE | Noted: 2024-06-14

## 2025-03-22 PROBLEM — N30.01 ACUTE CYSTITIS WITH HEMATURIA: Status: ACTIVE | Noted: 2025-03-22

## 2025-03-22 PROBLEM — J44.9 CHRONIC OBSTRUCTIVE PULMONARY DISEASE, UNSPECIFIED COPD TYPE (H): Status: ACTIVE | Noted: 2024-06-14

## 2025-03-22 LAB
ANION GAP SERPL CALCULATED.3IONS-SCNC: 8 MMOL/L (ref 7–15)
BUN SERPL-MCNC: 25.8 MG/DL (ref 8–23)
CALCIUM SERPL-MCNC: 8.5 MG/DL (ref 8.8–10.4)
CHLORIDE SERPL-SCNC: 95 MMOL/L (ref 98–107)
CREAT SERPL-MCNC: 0.77 MG/DL (ref 0.67–1.17)
EGFRCR SERPLBLD CKD-EPI 2021: >90 ML/MIN/1.73M2
ERYTHROCYTE [DISTWIDTH] IN BLOOD BY AUTOMATED COUNT: 14.6 % (ref 10–15)
GLUCOSE SERPL-MCNC: 101 MG/DL (ref 70–99)
HCO3 SERPL-SCNC: 30 MMOL/L (ref 22–29)
HCT VFR BLD AUTO: 32.2 % (ref 40–53)
HGB BLD-MCNC: 11.6 G/DL (ref 13.3–17.7)
MAGNESIUM SERPL-MCNC: 2.3 MG/DL (ref 1.7–2.3)
MCH RBC QN AUTO: 36 PG (ref 26.5–33)
MCHC RBC AUTO-ENTMCNC: 36 G/DL (ref 31.5–36.5)
MCV RBC AUTO: 100 FL (ref 78–100)
PLATELET # BLD AUTO: 131 10E3/UL (ref 150–450)
POTASSIUM SERPL-SCNC: 2.9 MMOL/L (ref 3.4–5.3)
POTASSIUM SERPL-SCNC: 3.9 MMOL/L (ref 3.4–5.3)
RBC # BLD AUTO: 3.22 10E6/UL (ref 4.4–5.9)
SODIUM SERPL-SCNC: 133 MMOL/L (ref 135–145)
TROPONIN T SERPL HS-MCNC: 28 NG/L
WBC # BLD AUTO: 6.2 10E3/UL (ref 4–11)

## 2025-03-22 PROCEDURE — 84132 ASSAY OF SERUM POTASSIUM: CPT | Performed by: INTERNAL MEDICINE

## 2025-03-22 PROCEDURE — 36415 COLL VENOUS BLD VENIPUNCTURE: CPT | Performed by: INTERNAL MEDICINE

## 2025-03-22 PROCEDURE — 83735 ASSAY OF MAGNESIUM: CPT | Performed by: INTERNAL MEDICINE

## 2025-03-22 PROCEDURE — 250N000013 HC RX MED GY IP 250 OP 250 PS 637: Performed by: INTERNAL MEDICINE

## 2025-03-22 PROCEDURE — 97166 OT EVAL MOD COMPLEX 45 MIN: CPT | Mod: GO

## 2025-03-22 PROCEDURE — 120N000001 HC R&B MED SURG/OB

## 2025-03-22 PROCEDURE — 97535 SELF CARE MNGMENT TRAINING: CPT | Mod: GO

## 2025-03-22 PROCEDURE — 97530 THERAPEUTIC ACTIVITIES: CPT | Mod: GO

## 2025-03-22 PROCEDURE — 85027 COMPLETE CBC AUTOMATED: CPT | Performed by: INTERNAL MEDICINE

## 2025-03-22 PROCEDURE — 80048 BASIC METABOLIC PNL TOTAL CA: CPT | Performed by: INTERNAL MEDICINE

## 2025-03-22 PROCEDURE — 97116 GAIT TRAINING THERAPY: CPT | Mod: GP

## 2025-03-22 PROCEDURE — 84484 ASSAY OF TROPONIN QUANT: CPT | Performed by: INTERNAL MEDICINE

## 2025-03-22 PROCEDURE — 97161 PT EVAL LOW COMPLEX 20 MIN: CPT | Mod: GP

## 2025-03-22 PROCEDURE — 250N000011 HC RX IP 250 OP 636: Performed by: INTERNAL MEDICINE

## 2025-03-22 PROCEDURE — 99232 SBSQ HOSP IP/OBS MODERATE 35: CPT | Performed by: FAMILY MEDICINE

## 2025-03-22 PROCEDURE — 36415 COLL VENOUS BLD VENIPUNCTURE: CPT | Performed by: FAMILY MEDICINE

## 2025-03-22 RX ORDER — POTASSIUM CHLORIDE 7.45 MG/ML
10 INJECTION INTRAVENOUS
Status: DISCONTINUED | OUTPATIENT
Start: 2025-03-22 | End: 2025-03-22

## 2025-03-22 RX ORDER — POTASSIUM CHLORIDE 1500 MG/1
40 TABLET, EXTENDED RELEASE ORAL ONCE
Status: COMPLETED | OUTPATIENT
Start: 2025-03-22 | End: 2025-03-22

## 2025-03-22 RX ORDER — IPRATROPIUM BROMIDE AND ALBUTEROL SULFATE 2.5; .5 MG/3ML; MG/3ML
3 SOLUTION RESPIRATORY (INHALATION)
Status: DISCONTINUED | OUTPATIENT
Start: 2025-03-22 | End: 2025-03-22

## 2025-03-22 RX ORDER — LIDOCAINE HYDROCHLORIDE 20 MG/ML
JELLY TOPICAL
Status: DISCONTINUED | OUTPATIENT
Start: 2025-03-22 | End: 2025-03-25 | Stop reason: HOSPADM

## 2025-03-22 RX ORDER — IPRATROPIUM BROMIDE AND ALBUTEROL SULFATE 2.5; .5 MG/3ML; MG/3ML
3 SOLUTION RESPIRATORY (INHALATION) EVERY 4 HOURS PRN
Status: DISCONTINUED | OUTPATIENT
Start: 2025-03-22 | End: 2025-03-25 | Stop reason: HOSPADM

## 2025-03-22 RX ORDER — POTASSIUM CHLORIDE 1500 MG/1
20 TABLET, EXTENDED RELEASE ORAL ONCE
Status: COMPLETED | OUTPATIENT
Start: 2025-03-22 | End: 2025-03-22

## 2025-03-22 RX ADMIN — ATORVASTATIN CALCIUM 20 MG: 20 TABLET, FILM COATED ORAL at 10:32

## 2025-03-22 RX ADMIN — TAMSULOSIN HYDROCHLORIDE 0.8 MG: 0.4 CAPSULE ORAL at 21:58

## 2025-03-22 RX ADMIN — PANTOPRAZOLE SODIUM 40 MG: 40 TABLET, DELAYED RELEASE ORAL at 08:03

## 2025-03-22 RX ADMIN — POTASSIUM CHLORIDE 40 MEQ: 1500 TABLET, EXTENDED RELEASE ORAL at 04:16

## 2025-03-22 RX ADMIN — POTASSIUM CHLORIDE 20 MEQ: 1500 TABLET, EXTENDED RELEASE ORAL at 05:46

## 2025-03-22 RX ADMIN — CEFTRIAXONE SODIUM 1 G: 1 INJECTION, POWDER, FOR SOLUTION INTRAMUSCULAR; INTRAVENOUS at 22:01

## 2025-03-22 RX ADMIN — DOCUSATE SODIUM 100 MG: 100 CAPSULE, LIQUID FILLED ORAL at 10:32

## 2025-03-22 RX ADMIN — FINASTERIDE 5 MG: 5 TABLET, FILM COATED ORAL at 10:32

## 2025-03-22 ASSESSMENT — ACTIVITIES OF DAILY LIVING (ADL)
ADLS_ACUITY_SCORE: 34
ADLS_ACUITY_SCORE: 39
ADLS_ACUITY_SCORE: 39
ADLS_ACUITY_SCORE: 34
ADLS_ACUITY_SCORE: 35
ADLS_ACUITY_SCORE: 47
ADLS_ACUITY_SCORE: 37
ADLS_ACUITY_SCORE: 34
ADLS_ACUITY_SCORE: 34
ADLS_ACUITY_SCORE: 39
ADLS_ACUITY_SCORE: 35
ADLS_ACUITY_SCORE: 37
ADLS_ACUITY_SCORE: 34
ADLS_ACUITY_SCORE: 37
ADLS_ACUITY_SCORE: 39
ADLS_ACUITY_SCORE: 37
ADLS_ACUITY_SCORE: 35
ADLS_ACUITY_SCORE: 39
ADLS_ACUITY_SCORE: 39
ADLS_ACUITY_SCORE: 34
ADLS_ACUITY_SCORE: 39
ADLS_ACUITY_SCORE: 35
ADLS_ACUITY_SCORE: 37

## 2025-03-22 NOTE — PROGRESS NOTES
[facility]    Medicine Progress Note - Hospitalist Service    Date of Admission:  3/21/2025    Assessment & Plan   This 73 yo male with a PMH significant for bladder cancer with urinary retention requiring self cath, hyperlipidemia, COPD and alcohol dependence who presented to the ER with complaints of progressive weakness over the past year.  He is unable to be managed at home safely any longer.  ER evaluation found very low mag at 0.8, , , bili 2.9, lactic acid 3.1, troponin 30 decreased to 26, UA positive for blood, leukocyte esterase, >182 WBCs, many bacteria.  He was treated with sepsis fluids 30 ml/kg, mag replaced and started on Rocephin.  He is admitted for continued cares of sepsis due to UTI.    Principal Problem:    Severe sepsis with acute organ dysfunction (H) - hyperlactatemia due to UTI    Acute cystitis with hematuria    Assessment: Acute    Plan: Likely caused by the need to self cath for urinary retention.  Lactic acid normalized after initial treatment.  The day after admission the patient is having urinary frequency and bladder scan showed over 1 L of urine present that the patient has been unable to void.  Pearce catheter placed.  Continue finasteride and tamsulosin.  With increased weakness and inability to manage at home any longer he will require a rehab stay.    Active Problems:    Chronic obstructive pulmonary disease, unspecified COPD type (H)    Assessment: Stable    Plan: Continues to smoke but declines a nicotine patch during his hospitalization as he got a rash the last time and the first time he just kept smoking with the patch on so it did not help at all.  Not on regular medications but may use DuoNeb every 4 hours if needed.      Alcohol dependence with unspecified alcohol-induced disorder (H)    Assessment: Stable    Plan: Unclear if he has been drinking recently as he is a poor historian and there are no family members present during the visit.   "Monitor for any concerns but he is doing well right now.      Personal history of malignant neoplasm of bladder    Assessment: In remission    Plan: Has prostatic hyperplasia also with urinary retention requiring self cath.  Now with urinary retention so matta catheter placed.  Continue to follow with urology annually as he has been.      Mixed hyperlipidemia    Assessment: Chronic    Plan: Last lipid profile in this chart from 9/20/2023 but [facility] are not [facility].  Taking Lipitor 20 mg daily and will continue.  Has elevated LFT's but this is likely chronic as it was listed on his problem list 9 months ago.      Hypomagnesemia    Assessment: Replaced    Plan: Given 4 gm mag sulfate with improvement in his level to 2.3.  Recheck periodically and replace as needed.        Hypokalemia    Assessment: Acute    Plan: Replaced po and recheck improved.  Repeat check in AM and replace as needed.          Diet: Combination Diet Regular Diet Adult    DVT Prophylaxis: Pneumatic Compression Devices  Matta Catheter: Not present  Lines: None     Cardiac Monitoring: None  Code Status: No CPR- Do NOT Intubate      Clinically Significant Risk Factors Present on Admission        # Hypokalemia: Lowest K = 2.9 mmol/L in last 2 days, will replace as needed  # Hyponatremia: Lowest Na = 133 mmol/L in last 2 days, will monitor as appropriate  # Hypochloremia: Lowest Cl = 91 mmol/L in last 2 days, will monitor as appropriate  # Hypocalcemia: Lowest Ca = 8.5 mg/dL in last 2 days, will monitor and replace as appropriate   # Hypomagnesemia: Lowest Mg = 0.8 mg/dL in last 2 days, will replace as needed                 # Cachexia: Estimated body mass index is 16.9 kg/m  as calculated from the following:    Height as of this encounter: 1.93 m (6' 4\").    Weight as of this encounter: 63 kg (138 lb 12.8 oz).              Social Drivers of Health    Housing Stability: High Risk (3/21/2025)    Housing Stability "     Do you have housing? : Yes     Are you worried about losing your housing?: Yes   Tobacco Use: High Risk (7/10/2024)    Patient History     Smoking Tobacco Use: Every Day     Smokeless Tobacco Use: Never   Alcohol Use: Alcohol Misuse (6/15/2019)    [facility]    AUDIT-C     Frequency of Alcohol Consumption: 4 or more times a week     Average Number of Drinks: 5 or 6   Transportation Needs: High Risk (3/21/2025)    Transportation Needs     Within the past 12 months, has lack of transportation kept you from medical appointments, getting your medicines, non-medical meetings or appointments, work, or from getting things that you need?: Yes   Physical Activity: Unknown (6/13/2024)    Exercise Vital Sign     Days of Exercise per Week: 2 days   Social Connections: Unknown (6/13/2024)    Social Connection and Isolation Panel [NHANES]     Frequency of Social Gatherings with Friends and Family: Once a week          Disposition Plan     Medically Ready for Discharge: Anticipated in 2-4 Days             Coty Mobley MD  Hospitalist Service  [facility]  Securely message with [employer] (more info)  Text page via [facility] Paging/Directory   ______________________________________________________________________    Interval History   Feeling OK.  Having to get up to urinate frequently and bladder scan found to have 1 L of urine.  Reports he was supposed to cath TID but he didn't have much urine so decreased to BID and now just doing daily as he does not have much urine out regularly.  Does not drink much fluid during the day.    Physical Exam   Vital Signs: Temp: 98.8  F (37.1  C) Temp src: Tympanic BP: 101/65 Pulse: 71   Resp: 16 SpO2: 98 % O2 Device: None (Room air)    Weight: 138 lbs 12.8 oz    Constitutional: awake, alert, cooperative, no apparent distress, appears older than stated age, and thin  Eyes: pupils equal, round and reactive to light, extra-ocular muscles intact,  and conjunctiva normal  ENT: normocephalic, atraumatic  Respiratory: no increased work of breathing, decreased air exchange throughout with faint expiratory wheezing  Cardiovascular: regular rate and rhythm, normal S1 and S2, no murmur noted but very distant heart sounds and no edema  GI: normal bowel sounds, soft, non-distended, and non-tender  Skin: bruises on arms but no redness, warmth, or swelling  Musculoskeletal: no lower extremity pitting edema present  there is no redness, warmth, or swelling of the joints  full range of motion noted  motor strength is 5 out of 5 all extremities bilaterally  Neurologic: Mental Status Exam:  Fund of Knowledge:  normal  Attention/Concentration:  normal  Language:  normal  Cranial Nerves:  cranial nerves II-XII are grossly intact  Motor Exam:  Motor exam is symmetrical 5 out of 5 all extremities bilaterally  Neuropsychiatric: General: normal, calm, and normal eye contact  Level of consciousness: alert / normal  Affect: normal  Orientation: oriented to self, place, time and situation  Memory and insight: normal and thought process normal    Medical Decision Making             Data     I have personally reviewed the following data over the past 24 hrs:    6.2  \   11.6 (L)   / 131 (L)     133 (L) 95 (L) 25.8 (H) /  101 (H)   3.9 30 (H) 0.77 \     Trop: 28 (H) BNP: N/A     Procal: N/A CRP: N/A Lactic Acid: 2.0         Imaging results reviewed over the past 24 hrs:   Recent Results (from the past 24 hours)   CT Head w/o Contrast    Narrative    EXAM: CT HEAD W/O CONTRAST  LOCATION: [facility]  DATE: 3/21/2025    INDICATION: 74 year old male with complaints of weakness  COMPARISON: None.  TECHNIQUE: Routine CT Head without IV contrast. Multiplanar reformats. Dose reduction techniques were used.    FINDINGS:  INTRACRANIAL CONTENTS: No evidence of acute intracranial hemorrhage or mass effect. Scattered foci of decreased attenuation within the cerebral  hemispheric white matter which are nonspecific, though most commonly ascribed to chronic small vessel ischemic   disease. The ventricles and sulci are prominent consistent with mild brain parenchymal volume loss. Gray-white matter differentiation is maintained. The basilar cisterns are patent.    VISUALIZED ORBITS/SINUSES/MASTOIDS: The globes are unremarkable. The partially imaged paranasal sinuses, mastoid air cells and middle ear cavities are unremarkable.     BONES/SOFT TISSUES: The visualized skull base and calvarium are unremarkable.      Impression    IMPRESSION:    1.  No evidence of acute intracranial hemorrhage or mass effect.  2.  Mild nonspecific white matter changes.  3.  Mild brain parenchymal volume loss.   CT Chest (PE) Abdomen Pelvis w Contrast    Narrative    EXAM: CT CHEST PE ABDOMEN PELVIS W CONTRAST  LOCATION: [facility]  DATE: 3/21/2025    INDICATION: Bladder cancer; weakness; indeterminate pulmonary nodule on previous outside imaging.  COMPARISON: None.  TECHNIQUE: CT chest pulmonary angiogram and routine CT abdomen pelvis with IV contrast. Arterial phase through the chest and venous phase through the abdomen and pelvis. Multiplanar reformats and MIP reconstructions were performed. Dose reduction   techniques were used.   CONTRAST: 75mL Isovue 370    FINDINGS: Patient was imaged with arm(s) at side, limiting study quality.    ANGIOGRAM CHEST: No acute pulmonary embolism.    Thoracic aorta is normal in course and caliber. Moderate atherosclerotic calcifications.    LUNGS AND PLEURA: Mild tracheobronchial debris and diffuse bronchial wall thickening. Advanced pulmonary emphysema and minimal dependent atelectatic change. No acute airspace consolidation. Mild biapical pleural/parenchymal scarring, greatest on the   right.       Few sub-4 mm pulmonary nodules.    No pneumothorax.     No pleural effusion.     MEDIASTINUM/AXILLAE: No mediastinal/hilar lymphadenopathy.      Thoracic esophagus is unremarkable.      No axillary lymphadenopathy.    Mild bilateral gynecomastia.    Heart is normal in size. No pericardial effusion.    CORONARY ARTERY CALCIFICATION: Mild.    HEPATOBILIARY: Severe hepatic steatosis. Mild hepatomegaly.      Gallbladder is normal.    No intrahepatic or intrahepatic biliary ductal dilatation.    PANCREAS: Enhances normally. No peripancreatic inflammatory fat stranding.    SPLEEN: Enhances normally. Normal size.    ADRENAL GLANDS: Normal.    KIDNEYS: Both kidneys enhance symmetrically, without hydronephrosis.    No nephroureterolithiasis. Benign appearing renal cyst(s), in addition to other low attenuation renal lesion(s), which are too small to characterize, though statistically likely to represent simple cysts; no further follow up recommended.    Diffusely thick-walled urinary bladder, some of which may correspond to patient's reported bladder malignancy.    PELVIC ORGANS: Mild prostatomegaly.    BOWEL: Wall thickening of the proximal ascending colon, series 11 image 156, likely due to incomplete distention, though a colonic malignancy is difficult to exclude. Correlation colonoscopy is recommended on a nonemergent basis. No evidence of acute   gastrointestinal inflammation or obstruction. Normal appendix.    No intraperitoneal free fluid or free air.    LYMPH NODES: No suspicious abdominal or pelvic lymphadenopathy.    VASCULATURE: Aneurysm dilatation of the infrarenal abdominal aorta, measuring up to 3.2 cm. Severe atheromatous disease of the abdominal aorta and branch vessels. Notably dense atherosclerotic calcification within the proximal left common iliac artery,   resulting in focal occlusion versus severe stenosis, though distal patency is preserved.    MUSCULOSKELETAL: Old posterior right rib fracture deformities. Old fracture deformities of the right L2 and L3 transverse processes.    OTHER: No additionally significant abnormalities.       Impression    IMPRESSION:  1.  No acute pulmonary embolism.  2.  No acute CT abnormality of the abdomen/pelvis.  3.  Diffusely thick-walled urinary bladder, some of which may correspond to patient's reported bladder malignancy.  4.  Wall thickening of the proximal ascending colon, likely due to incomplete distention, though a colonic malignancy is difficult to exclude. Correlation with colonoscopy is recommended on a nonemergent basis.  5.  Severe hepatic steatosis.  6.  Aneurysmal dilatation of the infrarenal abdominal aorta, measuring up to 3.2 cm.  7.  Severe atheromatous disease of the abdominal aorta and branch vessels, with focal occlusion versus severe stenosis of the proximal left common iliac artery. Distal patency is preserved.  8.  Few sub-4 mm pulmonary nodules. Follow-up is recommended according to Fleischner criteria, as detailed below.  9.  Advanced pulmonary emphysema.      ATTENTION: ABNORMAL REPORT    THE DICTATION ABOVE DESCRIBES AN ABNORMALITY FOR WHICH FOLLOWUP IS NEEDED.      Fleischner Society Recommendations for Pulmonary Nodules    Nodule size less than 6 mm:     Single or multiple nodules:     Nodules < 6 mm do not require routine follow-up, but certain patients at high risk with suspicious nodule morphology, upper lobe location, or both may warrant 12-month follow-up.

## 2025-03-22 NOTE — PLAN OF CARE
"Goal Outcome Evaluation:      Plan of Care Reviewed With: patient    Overall Patient Progress: improvingOverall Patient Progress: improving    Outcome Evaluation: VSS, alert & oriented. Reports that he self straight caths, able to urinate on his own today in the restroom. Lungs diminished/clear    BP 99/58 (BP Location: Left arm, Patient Position: Semi-Willoughby's, Cuff Size: Adult Small)   Pulse 72   Temp 98  F (36.7  C) (Tympanic)   Resp 16   Ht 1.93 m (6' 4\")   Wt 63 kg (138 lb 12.8 oz)   SpO2 98%   BMI 16.90 kg/m      Freida Bernal RN on 3/22/2025 at 2:31 PM   "

## 2025-03-22 NOTE — PROGRESS NOTES
Admission Note    Data:  Avinash Wray admitted to [facility] from emergency room at 2155.      Action:  Dr. Robert has been notified of admission. Pt oriented to unit, call light in reach.     Response:  Patient tolerated transfer to [facility].

## 2025-03-22 NOTE — H&P
HOSPITALIST TELEMED ADMISSION H&P    Service Date : 3/21/2025  Dr. Xochilt GARCIA am located in [place], [place]  Avinash Wray is located in [place] at [facility]     RN, Arcadio,  on Med/Surg is assisting me today with this patient.    chief complaint: Generalized weakness    History of Present Illness:  Avinash Wray is a 74 year old male with  Hyperlipidemia, alcohol use disorder, restless leg syndrome, COPD, pulmonary nodules, BPH, and  malignant neoplasm of the urinary bladder and self catheterizes secondary to urinary obstruction. He was seen in [place] every year to evaluate if the bladder cancer returns. He has been cancer free 6 years.    [facility]  He states that his ex-wife had called the ambulance because he had had poor appetite, so that he had not eaten in the past 5 days.  He indicated that he believes that his ex-wife wants to try to move him out so that she can move in her ailing brother.   He indicated that he has been falling more frequently secondary to generalized weakness.  He vomited once yesterday and it was yellowish-green.  He also had 1 episode of diarrhea.  He said that he was experiencing lightheadedness and dizziness for the past few months and that more recently he has almost passed out.  He has denied any chest pain but states that he has a cough because he continues to smoke a pack of cigarettes a day.  He said that he falls because his legs have gotten really weak.  He denied any fever or chills but said that his  hands and feet are always cold        Emergency Room Course -  in the emergency room the patient was given IV fluids, magnesium replacement, and and started on Rocephin for UTI     Review of the initial comprehensive metabolic panel  is remarkable for carbon dioxide is 31, anion gap 16, BUN 35.7, chloride 91, glucose 116, , , total bili 2.9.  these lab indices indicate the  presence of alcohol use disorder   With impaired kidney function.     Initial troponin T is 30, repeat troponin T is 26 and final troponin is 25     Initial lactate is 3.1, after IV fluids the lactic acid had normalized to 2.0     Initial magnesium 0.8, after magnesium replacement, the magnesium is 2.3     Urinalysis shows the urine color to be orange, the appearance is slightly cloudy, the urine bilirubin there is a small amount, there is trace ketones, there is a moderate amount of blood, the pH is 6.0, protein albumin is 200, the urobilinogen is greater than 12, the leukocyte esterase is large, the bacteria are many, the mucus is present, RBC urine is 63, WBCs are greater than 182 and the hyaline cast are greater than 10.   The combination of the findings would suggest an inflammatory response in the urinary tract.  The absence of nitrite rules out a urinary tract infection caused by E. Coli,   However the combination of the findings does suggest a possible urinary tract infection given that there is a large amount of bacteria.       Radiological diagnostics included:    Narrative & Impression   EXAM: CT HEAD W/O CONTRAST  LOCATION: [facility]  DATE: 3/21/2025     INDICATION: 74 year old male with complaints of weakness  COMPARISON: None.  TECHNIQUE: Routine CT Head without IV contrast. Multiplanar reformats. Dose reduction techniques were used.     FINDINGS:  INTRACRANIAL CONTENTS: No evidence of acute intracranial hemorrhage or mass effect. Scattered foci of decreased attenuation within the cerebral hemispheric white matter which are nonspecific, though most commonly ascribed to chronic small vessel ischemic   disease. The ventricles and sulci are prominent consistent with mild brain parenchymal volume loss. Gray-white matter differentiation is maintained. The basilar cisterns are patent.     VISUALIZED ORBITS/SINUSES/MASTOIDS: The globes are unremarkable. The partially imaged paranasal  sinuses, mastoid air cells and middle ear cavities are unremarkable.      BONES/SOFT TISSUES: The visualized skull base and calvarium are unremarkable.                                                                      IMPRESSION:    1.  No evidence of acute intracranial hemorrhage or mass effect.  2.  Mild nonspecific white matter changes.  3.  Mild brain parenchymal volume loss.          Narrative & Impression   EXAM: CT CHEST PE ABDOMEN PELVIS W CONTRAST  LOCATION: [facility]  DATE: 3/21/2025     INDICATION: Bladder cancer; weakness; indeterminate pulmonary nodule on previous outside imaging.  COMPARISON: None.  TECHNIQUE: CT chest pulmonary angiogram and routine CT abdomen pelvis with IV contrast. Arterial phase through the chest and venous phase through the abdomen and pelvis. Multiplanar reformats and MIP reconstructions were performed. Dose reduction   techniques were used.   CONTRAST: 75mL Isovue 370     FINDINGS: Patient was imaged with arm(s) at side, limiting study quality.     ANGIOGRAM CHEST: No acute pulmonary embolism.     Thoracic aorta is normal in course and caliber. Moderate atherosclerotic calcifications.     LUNGS AND PLEURA: Mild tracheobronchial debris and diffuse bronchial wall thickening. Advanced pulmonary emphysema and minimal dependent atelectatic change. No acute airspace consolidation. Mild biapical pleural/parenchymal scarring, greatest on the   right.       Few sub-4 mm pulmonary nodules.     No pneumothorax.      No pleural effusion.     MEDIASTINUM/AXILLAE: No mediastinal/hilar lymphadenopathy.      Thoracic esophagus is unremarkable.       No axillary lymphadenopathy.     Mild bilateral gynecomastia.     Heart is normal in size. No pericardial effusion.     CORONARY ARTERY CALCIFICATION: Mild.     HEPATOBILIARY: Severe hepatic steatosis. Mild hepatomegaly.       Gallbladder is normal.     No intrahepatic or intrahepatic biliary ductal dilatation.      PANCREAS: Enhances normally. No peripancreatic inflammatory fat stranding.     SPLEEN: Enhances normally. Normal size.     ADRENAL GLANDS: Normal.     KIDNEYS: Both kidneys enhance symmetrically, without hydronephrosis.     No nephroureterolithiasis. Benign appearing renal cyst(s), in addition to other low attenuation renal lesion(s), which are too small to characterize, though statistically likely to represent simple cysts; no further follow up recommended.     Diffusely thick-walled urinary bladder, some of which may correspond to patient's reported bladder malignancy.     PELVIC ORGANS: Mild prostatomegaly.     BOWEL: Wall thickening of the proximal ascending colon, series 11 image 156, likely due to incomplete distention, though a colonic malignancy is difficult to exclude. Correlation colonoscopy is recommended on a nonemergent basis. No evidence of acute   gastrointestinal inflammation or obstruction. Normal appendix.     No intraperitoneal free fluid or free air.     LYMPH NODES: No suspicious abdominal or pelvic lymphadenopathy.     VASCULATURE: Aneurysm dilatation of the infrarenal abdominal aorta, measuring up to 3.2 cm. Severe atheromatous disease of the abdominal aorta and branch vessels. Notably dense atherosclerotic calcification within the proximal left common iliac artery,   resulting in focal occlusion versus severe stenosis, though distal patency is preserved.     MUSCULOSKELETAL: Old posterior right rib fracture deformities. Old fracture deformities of the right L2 and L3 transverse processes.     OTHER: No additionally significant abnormalities.                                                                      IMPRESSION:  1.  No acute pulmonary embolism.  2.  No acute CT abnormality of the abdomen/pelvis.  3.  Diffusely thick-walled urinary bladder, some of which may correspond to patient's reported bladder malignancy.  4.  Wall thickening of the proximal ascending colon, likely due to  incomplete distention, though a colonic malignancy is difficult to exclude. Correlation with colonoscopy is recommended on a nonemergent basis.  5.  Severe hepatic steatosis.  6.  Aneurysmal dilatation of the infrarenal abdominal aorta, measuring up to 3.2 cm.  7.  Severe atheromatous disease of the abdominal aorta and branch vessels, with focal occlusion versus severe stenosis of the proximal left common iliac artery. Distal patency is preserved.  8.  Few sub-4 mm pulmonary nodules. Follow-up is recommended according to Fleischner criteria, as detailed below.  9.  Advanced pulmonary emphysema.        ATTENTION: ABNORMAL REPORT     THE DICTATION ABOVE DESCRIBES AN ABNORMALITY FOR WHICH FOLLOWUP IS NEEDED.        Fleischner Society Recommendations for Pulmonary Nodules     Nodule size less than 6 mm:      Single or multiple nodules:      Nodules < 6 mm do not require routine follow-up, but certain patients at high risk with suspicious nodule morphology, upper lobe location, or both may warrant 12-month follow-up.        Past Medical History  Past Medical History:   Diagnosis Date    Constipation 06/14/2024    Meningitis 06/14/2024    Oct 16, 2012 Entered By: KHADIJAH BULLOCK Comment: While in [facility].      Pneumothorax 06/13/2019    Primary spontaneous pneumothorax 03/09/2023    Oct 16, 2012 Entered By: KHADIJAH BULLOCK Comment: Treated with chest tube        Patient Active Problem List   Diagnosis    Other viral warts    Restless leg    Venous insufficiency    Malignant neoplasm of urinary bladder (H)    Convulsions, unspecified convulsion type (H)    Benign localized prostatic hyperplasia with lower urinary tract symptoms (LUTS)    Chronic obstructive pulmonary disease, unspecified COPD type (H)    Alcohol dependence with unspecified alcohol-induced disorder (H)    Abnormal liver enzymes    Asymptomatic varicose veins    Low back pain    Seborrheic dermatitis, unspecified    Multiple pulmonary nodules     "Osteoarthrosis    Other psoriasis    Other retention of urine    Personal history of malignant neoplasm of bladder    Mixed hyperlipidemia    Hypomagnesemia    Sepsis due to urinary tract infection (H)         Past Surgical History  Past Surgical History:   Procedure Laterality Date    COLONOSCOPY N/A 11/12/2020    7 tubular adenomas, follow up 3 years, 11/12/2023    ESOPHAGOSCOPY, GASTROSCOPY, DUODENOSCOPY (EGD), COMBINED N/A 11/12/2020    ALONSO's follow up 3 years, 11/12/2023      Social History  Avinash  reports that he has been smoking cigarettes. He started smoking about 53 years ago. He has a 53.2 pack-year smoking history. He has never used smokeless tobacco. He reports current alcohol use. He reports that he does not currently use drugs.    Family History  Avinash's family history is not on file.    Home Medications  Current Outpatient Medications   Medication Instructions    atorvastatin (LIPITOR) 20 mg    finasteride (PROSCAR) 5 mg    omeprazole (PRILOSEC) 20 mg, Oral, DAILY    tamsulosin (FLOMAX) 0.8 mg       Allergies  No Known Allergies     10 pt ROS neg except as noted in HPI    Physical Exam:  I performed all aspects of the physical examination via Telemedicine associated with two way audio and video communication.    Vital Signs: Blood pressure 117/76, pulse 94, temperature 97.7  F (36.5  C), temperature source Tympanic, resp. rate 18, height 1.93 m (6' 4\"), weight 65.8 kg (145 lb), SpO2 99%.    Physical Exam    Gen: Elderly male, in no acute distress, lying semi-supine in his hospital bed  HEENT: NC/AT,  hearing intact to voice  Resp:  No accessory muscle use, breath sounds clear; no wheezes no rales no rhonchi  Card:  No murmur, normal S1, S2   Abd:  Soft per RN exam, no TTP, non-distended, normoactive bowel sounds are present  Ext: No clubbing, cyanosis or edema was noted  Skin: No rashes or skin breakdown  Psych:  Not anxious, not agitated    DATA:    I&O: No intake/output data recorded.      " "    Labs:  I have personally reviewed the following studies:  Recent Labs   Lab 03/21/25  1629   POTASSIUM 3.6   CHLORIDE 91*   CO2 31*   BUN 35.7*   ALBUMIN 4.4   ALKPHOS 91   *   *   INR 0.99      Recent Labs   Lab 03/21/25  1629   WBC 7.7   HGB 14.9   HCT 41.1            Imaging: ER imaging reviewed    Misc  DVT prophylaxis:pneumatic compression devices  Code Status: Full code   Cardiac Monitoring: No active telemetry  Pearce: None  Lines:  peripheral IV  Diet:  regular diet      ASSESSMENT/PLAN:    74-year-old with generalized weakness, hypomagnesemia,  urinalysis suggestive of a UTI will be admitted to the ICU for further management and stabilization.      This patient's current admission to an acute care setting is essential for the treatment of UTI and severe hypomagnesemia    The patient's recovery is dependent on the following treatments of   -  Magnesium replacement  -   IV Rocephin 1g q 24hr  -follow up on Urine Cx  - Monitor with BMP and magnesium level  - PT/OT  -Correcting any electrolyte  abnormalities to stabilize this condition.       The patient is at risk of developing further complications of end organ failures if not treated in an acute care setting. I expect the patient's hospital stay to require       Our patient will be admitted under the hospitalist services and further management to be based on patient's clinical progress.         # ACTIVE PROBLEM LISTS:    #COPD/Advanced Emphysema  - Duoneb Tx q 6h    # Hyperlipidemia: Stable  - Continue medication management with atorvastatin 20mg     #BPH  - Proscar 5mg q day  - Flomax 0.4mg q day    #Urinary retention  - patient self catheterizes once daily    # Cachexia:    Estimated body mass index is 17.65 kg/m  as calculated from the following:    Height as of this encounter: 1.93 m (6' 4\").    Weight as of this encounter: 65.8 kg (145 lb).       Clinically Significant Risk Factors Present on Admission        Our patient will be " admitted under the hospitalist services and further management to be based on patient's clinical progress.      As the provider for the telehealth service, I attest that I introduced myself to the patient and provided my credentials. Based on review of the patient s chart and/or a discussion with members of the patient s treatment team, I determined that telemedicine via a real-time, two-way, interactive audio and video platform is an appropriate means of providing this service.     The encounter was approximately 25 minutes. The nurse was present for the duration of the encounter.    Chart reviewed,labs and available imaging reviewed,consultant notes reviewed. Previous available medical records have been reviewed  Care plan discussed with care team    I have seen and examined the patient today. Documentation for this visit was completed using a template. Everything documented was personally performed today with the necessary additions, deletions and changes made as appropriate with the diagnoses being listed in no particular order of clinical relevance.    [facility]  Securely message with the Vocera Web Console (learn more here)  Text page via [employer] Paging/Directory

## 2025-03-22 NOTE — PROGRESS NOTES
03/22/25 1200   Appointment Info   Signing Clinician's Name / Credentials (PT) Catherine Garduno DPT   Living Environment   People in Home other (see comments)  (roommate/ex-wife)   Current Living Arrangements mobile home   Home Accessibility stairs to enter home   Number of Stairs, Main Entrance 3   Stair Railings, Main Entrance railings on both sides of stairs   Living Environment Comments pt has complicated relationship with current roommate, he stated he may be moving to a [facility] soon   Self-Care   Usual Activity Tolerance fair   Current Activity Tolerance poor   Equipment Currently Used at Home none   Fall history within last six months yes   Number of times patient has fallen within last six months 2   Activity/Exercise/Self-Care Comment patient is unsteady, used FWW for ambulation which helped increased stabiltiy   General Information   Weight-Bearing Status - LLE full weight-bearing   Weight-Bearing Status - RLE full weight-bearing   Cognition   Affect/Mental Status (Cognition) confused  (mild confusion)   Orientation Status (Cognition) oriented x 3   Safety Deficit (Cognition) minimal deficit;judgment;safety precautions awareness   Pain Assessment   Patient Currently in Pain No   Range of Motion (ROM)   Range of Motion ROM is WFL   Strength (Manual Muscle Testing)   Strength (Manual Muscle Testing) strength is WFL   Bed Mobility   Bed Mobility no deficits identified   Transfers   Transfers sit-stand transfer   Impairments Contributing to Impaired Transfers impaired balance;decreased sensation   Sit-Stand Transfer   Sit-Stand [place] (Transfers) contact guard   Gait/Stairs (Locomotion)   [place] Level (Gait) contact guard   Assistive Device (Gait) walker, front-wheeled   Distance in Feet (Gait) 60   Pattern (Gait) step-through   Deviations/Abnormal Patterns (Gait) base of support, wide;reagan decreased   Balance   Balance other (describe)   Balance Comments impaired balance, improved  with FWW   Sensory Examination   Sensory Perception other (describe)   Sensory Perception Comments patient states sensory impairment in L foot, long standing issue   Clinical Impression   Criteria for Skilled Therapeutic Intervention Yes, treatment indicated   PT Diagnosis (PT) impaired balance, gait   Functional limitations due to impairments impaired balance, gait, instability   Clinical Presentation (PT Evaluation Complexity) stable   Clinical Decision Making (Complexity) low complexity   Planned Therapy Interventions (PT) balance training;gait training;transfer training   PT Total Evaluation Time   PT Eval, Low Complexity Minutes (95541) 15   Physical Therapy Goals   PT Frequency Daily   PT Predicted Duration/Target Date for Goal Attainment 03/25/25   PT Goals Transfers;Gait;Stairs   PT: Transfers Modified independent   PT: Gait Modified independent   PT: Stairs Modified independent   Gait Training   Gait Training Minutes (57353) 15   Symptoms Noted During/After Treatment (Gait Training) fatigue   Treatment Detail/Skilled Intervention initiated AD training with FWW, ambulated 60ft CGA   Distance in Feet 60   [place] Level (Gait Training) contact guard   Weight Bearing (Gait Training) full weight-bearing   Assistive Device (Gait Training) rolling walker   Impairments (Gait Analysis/Training) balance impaired;sensation decreased   PT Discharge Planning   PT Plan continue PT   PT Discharge Recommendation (DC Rec) Long term care facility   PT Rationale for DC Rec patient currently unable to safely complete ADLs, ambulation independently   PT Brief overview of current status patient is   PT Total Distance Amb During Session (feet) 60   PT Equipment Needed at Discharge walker, rolling   Physical Therapy Time and Intention   Timed Code Treatment Minutes 15   Total Session Time (sum of timed and untimed services) 30

## 2025-03-22 NOTE — PROGRESS NOTES
03/22/25 1301   Appointment Info   Signing Clinician's Name / Credentials (OT) Sheyla Jarrell, OTR/L   Living Environment   People in Home other (see comments)  (Roommate)   Current Living Arrangements mobile home   Home Accessibility stairs to enter home   Number of Stairs, Main Entrance 3   Stair Railings, Main Entrance railings on both sides of stairs   Transportation Anticipated family or friend will provide   Self-Care   Usual Activity Tolerance fair   Current Activity Tolerance fair   Equipment Currently Used at Home none   Fall history within last six months yes   Number of times patient has fallen within last six months 2   Activity/Exercise/Self-Care Comment Patient reports he needs some help at home but later states that his roommate doesn't do anything for him.  Unclear of PLOF.   General Information   Onset of Illness/Injury or Date of Surgery 03/21/25   Referring Physician Dr. Mobley   Patient/Family Therapy Goal Statement (OT) Unclear- patient states he doesn't think he will be able to return home d/t roommate and thinks he will be going to a 's home in [place]   Existing Precautions/Restrictions no known precautions/restrictions   General Observations and Info Pt is 74-year-old male admitted with sepsis related to UTI.  He reports having several falls at home and poor balance but does not use AD at baseline.  He states that he needs some help with cares at home but it is unclear who provides this assistance.  He lives with a roommate and reports the relationship is strained.  He does not think he will be able to return to his previous living environment d/t the roommate and states he may be moving to a 's home in [place].   Cognitive Status Examination   Orientation Status person;place   Cognitive Status Comments Patient presents with mild confusion but able to convey wants/needs and give some information on PLOF and home environment.   Sensory   Sensory Comments L foot numbness-  chronic   Pain Assessment   Patient Currently in Pain   (Initially had abominal pain but relieved with urination; no further pain)   Bed Mobility   Comment (Bed Mobility) SBA   Transfers   Transfer Comments CGA   Balance   Balance Comments Fair- benefitted from use of AD for transfers and ambulation   Clinical Impression   Criteria for Skilled Therapeutic Interventions Met (OT) Yes, treatment indicated   OT Diagnosis Decreased independence with self-cares and functional mobility related to sepsis   OT Problem List-Impairments impacting ADL problems related to;activity tolerance impaired;balance;cognition;strength;mobility   Assessment of Occupational Performance 1-3 Performance Deficits   Planned Therapy Interventions (OT) ADL retraining;bed mobility training;transfer training   Clinical Decision Making Complexity (OT) detailed assessment/moderate complexity   Risk & Benefits of therapy have been explained evaluation/treatment results reviewed;care plan/treatment goals reviewed;risks/benefits reviewed;current/potential barriers reviewed;participants voiced agreement with care plan;participants included;patient   OT Total Evaluation Time   OT Eval, Moderate Complexity Minutes (15063) 15   Interventions   Interventions Quick Adds Therapeutic Activity;Self-Care/Home Management   Self-Care/Home Management   Self-Care/Home Mgmt/ADL, Compensatory, Meal Prep Minutes (98427) 10   Symptoms Noted During/After Treatment (Meal Preparation/Planning Training)   (Decreased pain)   [place] Level (Grooming Training) contact guard   Assistance (Grooming Training) 1 person assist   [place] Level (Toilet Training) contact guard   Assistance (Toilet Training) 1 person assist   Toilet Training Assistance - Assistive Device wall grab bar   Therapeutic Activities   Therapeutic Activity Minutes (46118) 10   Symptoms noted during/after treatment fatigue   Treatment Detail/Skilled Intervention Patient requires SBA for bed mobiilty  and CGA for transfers and ambulation.  Initially wanted to ambulate w/o AD but balance greatly improved with use of FWW.   OT Discharge Planning   OT Plan Continue OT   OT Discharge Recommendation (DC Rec) home with home care occupational therapy;Transitional Care Facility   OT Rationale for DC Rec Patient currently not safe to complete self-cares and functional mobility without assistance and AD/AE.  Will continue to assess needs throughout hospital stay.   OT Brief overview of current status See above for details.   Total Session Time   Timed Code Treatment Minutes 20   Total Session Time (sum of timed and untimed services) 35

## 2025-03-22 NOTE — PROGRESS NOTES
Preventing Falls in the Hospital (02:42)  Your health professional recommends that you watch this short online health video.  Find out why you're at risk for falling in the hospital and how to prevent falls.   Purpose: Understand what increases a person's risk of falling in the hospital and how to prevent falls.  Goal: Understand what increases a person's risk of falling in the hospital and how to prevent falls.    Watch: Scan the QR code or visit the link to view video       https://hwi.[email or web address]  Current as of: July 17, 2023  Content Version: 14.2 2024 [facility] [employer].   Care instructions adapted under license by your healthcare professional. If you have questions about a medical condition or this instruction, always ask your healthcare professional. [employer] disclaims any warranty or liability for your use of this information.  Preventing Falls in the Hospital

## 2025-03-22 NOTE — PROGRESS NOTES
Accessed chart pending admission to [facility].     Georgia Ny RN on 3/21/2025 at 9:36 PM

## 2025-03-22 NOTE — PHARMACY-ADMISSION MEDICATION HISTORY
Pharmacist Admission Medication History    Admission medication history is complete. The information provided in this note is only as accurate as the sources available at the time of the update.    Information Source(s): Patient and CareEverywhere/SureScripts via in-person    Pertinent Information: Patient has not taken medications apart from omeprazole in approx. 1 month due to forgetting- denies side effects from the medications, cost concerns, and attests that he has adequate supplies at home. No OTCs (PRN or scheduled) noted.    Changes made to PTA medication list:  Added: None  Deleted: None  Changed: Completed sigs on all medications    Allergies reviewed with patient and updates made in EHR: yes    Medication History Completed By: Shanelle Farr RPH 3/22/2025 8:05 AM    PTA Med List   Medication Sig Last Dose/Taking    atorvastatin (LIPITOR) 20 MG tablet Take 20 mg by mouth daily. Past Month Bedtime    finasteride (PROSCAR) 5 MG tablet Take 5 mg by mouth daily. Past Month Bedtime    omeprazole (PRILOSEC) 20 MG DR capsule Take 1 capsule (20 mg) by mouth daily Past Week Morning    tamsulosin (FLOMAX) 0.4 MG capsule Take 0.8 mg by mouth daily. Past Month Bedtime

## 2025-03-22 NOTE — PROGRESS NOTES
03/22/25 0504   Valuables   Patient Belongings remains with patient   Patient Belongings Remaining with Patient clothing;other (see comments)   Did you bring any home meds/supplements to the hospital?  No     A               Admission:  I am responsible for any personal items that are not sent to the safe or pharmacy.  [facility] is not responsible for loss, theft or damage of any property in my possession.    Signature:  _________________________________ Date: _______  Time: _____                                              Staff Signature:  ____________________________ Date: ________  Time: _____      2nd Staff person, if patient is unable/unwilling to sign:    Signature: ________________________________ Date: ________  Time: _____     Discharge:  [facility] has returned all of my personal belongings:    Signature: _________________________________ Date: ________  Time: _____                                          Staff Signature:  ____________________________ Date: ________  Time: _____

## 2025-03-22 NOTE — ED PROVIDER NOTES
"03/21/25   7 PM    I am accepting the care of this patient from Dr Carson at change of shift.  Avinash Wray is a 75 yo male who is not feeling. He has been in declining health for most of the past four years. He has a history of bladder cancer and self-caths. He has a history of alcohol abuse.     VS in the ED shows tachycardia  Patient Vitals for the past 24 hrs:   BP Temp Temp src Pulse Resp SpO2 Height Weight   03/21/25 1930 117/76 -- -- 94 18 99 % -- --   03/21/25 1915 135/76 -- -- 100 22 98 % -- --   03/21/25 1830 118/77 -- -- 104 21 -- -- --   03/21/25 1815 131/83 -- -- 79 21 100 % -- --   03/21/25 1800 (!) 121/109 -- -- 93 18 99 % -- --   03/21/25 1745 127/81 -- -- 78 17 99 % -- --   03/21/25 1735 -- -- -- -- -- -- 1.93 m (6' 4\") 65.8 kg (145 lb)   03/21/25 1730 118/89 -- -- 79 25 99 % -- --   03/21/25 1715 119/88 -- -- 90 12 99 % -- --   03/21/25 1700 107/84 -- -- 97 15 99 % -- --   03/21/25 1645 116/75 -- -- 105 21 100 % -- --   03/21/25 1630 128/83 -- -- 113 18 100 % -- --   03/21/25 1627 (!) 122/101 -- -- -- -- -- -- --   03/21/25 1624 -- 97.7  F (36.5  C) Tympanic 103 18 97 % -- --     We gave Mg replacement, IV bolus, Rocephin    EKG shows sinus tachycardia with occasional PAC's, rate 112, normal axis    Labs show CBC okay, CMP with BUN 36, , , total bili 2.9 (no comparison for 3 years), INR 0.99, CK normal, ethanol zero, PCT normal, troponin 26, lactic acid 3.1 and 2.0, UA implies UTI.    BC x 2 and UC pending.    CT head stable.    CT PE study ordered.     ED Course    I added another 500 mL NS bolus for a total of 2000 mL, which is about 30 mL/kg.  I stopped and spoke with him and he agrees to being in a hospital. We will make a decision once we see the CT.     8:12 PM  CT shows no acute CT abnormality of the abdomen/pelvis.  I think he can be in our hospital.     He has a repeat troponin ordered at about 8:30 PM and a repeat Mg as well.      He will need to be hospitalized given his " sepsis due to UTI, tachycardia with PAC's in the setting of low Mg. I did contact Dr Robert about this.     Diagnosis    (A41.9,  N39.0) Sepsis due to urinary tract infection (H)    (E83.42) Hypomagnesemia    (F10.11) History of alcohol abuse        Plan: admit               Naif Bloom MD  03/21/25 2055       Naif Bloom MD  03/21/25 2055

## 2025-03-23 ENCOUNTER — APPOINTMENT (OUTPATIENT)
Dept: OCCUPATIONAL THERAPY | Facility: OTHER | Age: 75
End: 2025-03-23
Payer: COMMERCIAL

## 2025-03-23 ENCOUNTER — APPOINTMENT (OUTPATIENT)
Dept: PHYSICAL THERAPY | Facility: OTHER | Age: 75
End: 2025-03-23
Payer: COMMERCIAL

## 2025-03-23 LAB
ANION GAP SERPL CALCULATED.3IONS-SCNC: 8 MMOL/L (ref 7–15)
BACTERIA UR CULT: ABNORMAL
BUN SERPL-MCNC: 18.1 MG/DL (ref 8–23)
CALCIUM SERPL-MCNC: 8.2 MG/DL (ref 8.8–10.4)
CHLORIDE SERPL-SCNC: 96 MMOL/L (ref 98–107)
CREAT SERPL-MCNC: 0.63 MG/DL (ref 0.67–1.17)
EGFRCR SERPLBLD CKD-EPI 2021: >90 ML/MIN/1.73M2
GLUCOSE SERPL-MCNC: 99 MG/DL (ref 70–99)
HCO3 SERPL-SCNC: 28 MMOL/L (ref 22–29)
HOLD SPECIMEN: NORMAL
HOLD SPECIMEN: NORMAL
POTASSIUM SERPL-SCNC: 3.5 MMOL/L (ref 3.4–5.3)
SODIUM SERPL-SCNC: 132 MMOL/L (ref 135–145)

## 2025-03-23 PROCEDURE — 36415 COLL VENOUS BLD VENIPUNCTURE: CPT | Performed by: FAMILY MEDICINE

## 2025-03-23 PROCEDURE — 99232 SBSQ HOSP IP/OBS MODERATE 35: CPT | Performed by: FAMILY MEDICINE

## 2025-03-23 PROCEDURE — 97116 GAIT TRAINING THERAPY: CPT | Mod: GP

## 2025-03-23 PROCEDURE — 120N000001 HC R&B MED SURG/OB

## 2025-03-23 PROCEDURE — 97530 THERAPEUTIC ACTIVITIES: CPT | Mod: GO

## 2025-03-23 PROCEDURE — 250N000011 HC RX IP 250 OP 636: Performed by: INTERNAL MEDICINE

## 2025-03-23 PROCEDURE — 80048 BASIC METABOLIC PNL TOTAL CA: CPT | Performed by: FAMILY MEDICINE

## 2025-03-23 PROCEDURE — 250N000013 HC RX MED GY IP 250 OP 250 PS 637: Performed by: INTERNAL MEDICINE

## 2025-03-23 PROCEDURE — 258N000003 HC RX IP 258 OP 636: Performed by: FAMILY MEDICINE

## 2025-03-23 RX ADMIN — SODIUM CHLORIDE 500 ML: 9 INJECTION, SOLUTION INTRAVENOUS at 11:15

## 2025-03-23 RX ADMIN — ATORVASTATIN CALCIUM 20 MG: 20 TABLET, FILM COATED ORAL at 10:27

## 2025-03-23 RX ADMIN — TAMSULOSIN HYDROCHLORIDE 0.8 MG: 0.4 CAPSULE ORAL at 21:50

## 2025-03-23 RX ADMIN — PANTOPRAZOLE SODIUM 40 MG: 40 TABLET, DELAYED RELEASE ORAL at 10:27

## 2025-03-23 RX ADMIN — CEFTRIAXONE SODIUM 1 G: 1 INJECTION, POWDER, FOR SOLUTION INTRAMUSCULAR; INTRAVENOUS at 21:51

## 2025-03-23 RX ADMIN — FINASTERIDE 5 MG: 5 TABLET, FILM COATED ORAL at 10:27

## 2025-03-23 ASSESSMENT — ACTIVITIES OF DAILY LIVING (ADL)
ADLS_ACUITY_SCORE: 47
ADLS_ACUITY_SCORE: 48
ADLS_ACUITY_SCORE: 47
ADLS_ACUITY_SCORE: 48
ADLS_ACUITY_SCORE: 47
ADLS_ACUITY_SCORE: 48
ADLS_ACUITY_SCORE: 47
ADLS_ACUITY_SCORE: 47
ADLS_ACUITY_SCORE: 48
ADLS_ACUITY_SCORE: 47
ADLS_ACUITY_SCORE: 48
ADLS_ACUITY_SCORE: 47
ADLS_ACUITY_SCORE: 48

## 2025-03-23 NOTE — PLAN OF CARE
Goal Outcome Evaluation:    A/O.  Low BP this am, 500mL bolus given.  Last /60  Pearce out per patient and MD approved.        Plan of Care Reviewed With: patient    Overall Patient Progress: improvingOverall Patient Progress: improving

## 2025-03-23 NOTE — PROGRESS NOTES
03/23/25 3284   Appointment Info   Signing Clinician's Name / Credentials (OT) Sheyla Jarrell OTR/L   Therapeutic Activities   Therapeutic Activity Minutes (60526) 25   Symptoms noted during/after treatment fatigue   Treatment Detail/Skilled Intervention Pt requires SBA for bed mobility and CGA for balance with sit to stand.  Able to ambulate with FWW and CGA x ~400'.  Patient agrees that he is much more stable when walking wtih FWW than without AD.   OT Discharge Planning   OT Plan Continue OT   OT Discharge Recommendation (DC Rec) home with home care occupational therapy;Transitional Care Facility   OT Rationale for DC Rec Patient currently not safe to complete self-cares and functional mobility without assistance and AD/AE.  Will continue to assess needs throughout hospital stay.   OT Brief overview of current status Patient with improved functional activity tolerance this date.  Does well ambulating with FWW.  Initially still has unsteadiness on feet upon standing.   Total Session Time   Timed Code Treatment Minutes 25   Total Session Time (sum of timed and untimed services) 25

## 2025-03-23 NOTE — PROGRESS NOTES
03/23/25 1500   Appointment Info   Signing Clinician's Name / Credentials (PT) Catherine Garduno DPT   Physical Therapy Goals   PT Frequency Daily   PT Predicted Duration/Target Date for Goal Attainment 03/25/25   PT Goals Transfers;Gait;Stairs   PT: Transfers Modified independent   PT: Gait Modified independent   PT: Stairs Modified independent   Gait Training   Gait Training Minutes (84362) 25   Symptoms Noted During/After Treatment (Gait Training) fatigue   Treatment Detail/Skilled Intervention ambulated 400ft, FWW, CGA-Min A, 3 standing rest breaks   Distance in Feet 400   [place] Level (Gait Training) contact guard   Assistive Device (Gait Training) rolling walker   Impairments (Gait Analysis/Training) balance impaired;sensation decreased   PT Discharge Planning   PT Plan continue PT   PT Discharge Recommendation (DC Rec) Long term care facility   PT Rationale for DC Rec patient currently unable to safely complete ADLs, ambulation independently   PT Total Distance Amb During Session (feet) 400   PT Equipment Needed at Discharge walker, rolling   Physical Therapy Time and Intention   Timed Code Treatment Minutes 25   Total Session Time (sum of timed and untimed services) 25

## 2025-03-23 NOTE — PLAN OF CARE
"Goal Outcome Evaluation: Patients vss. BP 94/59 (BP Location: Left arm, Patient Position: Right side, Cuff Size: Adult Small)   Pulse 72   Temp 98.6  F (37  C) (Tympanic)   Resp 16   Ht 1.93 m (6' 4\")   Wt 63 kg (138 lb 12.8 oz)   SpO2 97%   BMI 16.90 kg/m   Patient denies pain. Pearce is patent. Patient reports increased weakness, but states he is going home tomorrow no matter what. Patient states being in the hospital is ridiculous and he is tired of laying around here. Patient declined getting up and ambulating with staff. Patient reports issues at home with his ex wife, states she is kicking him out and he has nowhere to go. Would benefit from social work consult. Patient declines the want or need for short term rehab.                         "

## 2025-03-23 NOTE — PROGRESS NOTES
[facility]    Medicine Progress Note - Hospitalist Service    Date of Admission:  3/21/2025    Assessment & Plan   This 73 yo male with a PMH significant for bladder cancer with urinary retention requiring self cath, hyperlipidemia, COPD and alcohol dependence who presented to the ER with complaints of progressive weakness over the past year.  He is unable to be managed at home safely any longer.  ER evaluation found very low mag at 0.8, , , bili 2.9, lactic acid 3.1, troponin 30 decreased to 26, UA positive for blood, leukocyte esterase, >182 WBCs, many bacteria.  He was treated with sepsis fluids 30 ml/kg, mag replaced and started on Rocephin.  He is admitted for continued cares of sepsis due to UTI.    Principal Problem:    Severe sepsis with acute organ dysfunction (H) - hyperlactatemia due to UTI    Acute cystitis with hematuria    Assessment: Acute    Plan: Likely caused by the need to self cath for urinary retention.  Lactic acid normalized after initial treatment.  The day after admission the patient was having urinary frequency and bladder scan showed over 1 L of urine present that the patient has been unable to void.  Pearce catheter placed but he wants it removed the next day and will go back to straight cath.  Continue finasteride and tamsulosin.  With increased weakness and inability to manage at home any longer he will require a rehab stay.  UC positive for Streptococcus agalactiae/Group B Streptococcus sensitive to ceftriaxone so continue.  Blood cultures negative at 1 day.    Active Problems:    Chronic obstructive pulmonary disease, unspecified COPD type (H)    Assessment: Stable    Plan: Continues to smoke but declines a nicotine patch during his hospitalization as he got a rash the last time and the first time he just kept smoking with the patch on so it did not help at all.  Not on regular medications but may use DuoNeb every 4 hours if needed.      Alcohol  dependence with unspecified alcohol-induced disorder (H)    Assessment: Stable    Plan: Unclear if he has been drinking recently as he is a poor historian and there are no family members present during the visit.  Monitor for any concerns but he is doing well right now.      Personal history of malignant neoplasm of bladder    Assessment: In remission    Plan: Has prostatic hyperplasia also with urinary retention requiring self cath.  Continue to follow with urology annually as he has been.      Mixed hyperlipidemia    Assessment: Chronic    Plan: Last lipid profile in this chart from 9/20/2023 but [facility] not [facility].  Taking Lipitor 20 mg daily and will continue.  Has elevated LFT's but this is likely chronic as it was listed on his problem list 9 months ago.      Hypomagnesemia    Assessment: Replaced    Plan: Given 4 gm mag sulfate with improvement in his level to 2.3.  Recheck periodically and replace as needed.  Will also check phos once as that is also often low in alcohol abuse.        Hypokalemia    Assessment: Acute    Plan: Replaced po and recheck improved.  Repeat check in AM and replace as needed.          Diet: Combination Diet Regular Diet Adult    DVT Prophylaxis: Pneumatic Compression Devices  Pearce Catheter: Not present  Lines: None     Cardiac Monitoring: None  Code Status: No CPR- Do NOT Intubate      Clinically Significant Risk Factors        # Hypokalemia: Lowest K = 2.9 mmol/L in last 2 days, will replace as needed  # Hyponatremia: Lowest Na = 132 mmol/L in last 2 days, will monitor as appropriate  # Hypochloremia: Lowest Cl = 91 mmol/L in last 2 days, will monitor as appropriate  # Hypocalcemia: Lowest Ca = 8.2 mg/dL in last 2 days, will monitor and replace as appropriate   # Hypomagnesemia: Lowest Mg = 0.8 mg/dL in last 2 days, will replace as needed                  # Cachexia: Estimated body mass index is 16.38 kg/m  as calculated from the  "following:    Height as of this encounter: 1.93 m (6' 4\").    Weight as of this encounter: 61.1 kg (134 lb 9.6 oz)., PRESENT ON ADMISSION            Social Drivers of Health    Housing Stability: High Risk (3/21/2025)    Housing Stability     Do you have housing? : Yes     Are you worried about losing your housing?: Yes   Tobacco Use: High Risk (7/10/2024)    Patient History     Smoking Tobacco Use: Every Day     Smokeless Tobacco Use: Never   Alcohol Use: Alcohol Misuse (6/15/2019)    [facility]    AUDIT-C     Frequency of Alcohol Consumption: 4 or more times a week     Average Number of Drinks: 5 or 6   Transportation Needs: High Risk (3/21/2025)    Transportation Needs     Within the past 12 months, has lack of transportation kept you from medical appointments, getting your medicines, non-medical meetings or appointments, work, or from getting things that you need?: Yes   Physical Activity: Unknown (6/13/2024)    Exercise Vital Sign     Days of Exercise per Week: 2 days   Social Connections: Unknown (6/13/2024)    Social Connection and Isolation Panel [NHANES]     Frequency of Social Gatherings with Friends and Family: Once a week          Disposition Plan     Medically Ready for Discharge: Anticipated Tomorrow             Coty Mobley MD  Hospitalist Service  [facility]  Securely message with [employer] (more info)  Text page via [employer] Paging/Directory   ______________________________________________________________________    Interval History   Feeling fine.  States he is going home today.  Does not believe his ex-wife will not let him stay with her so he called her when she got up.  Then the ex-wife called the nurses' station and told them she will not be coming to get him and he will not be allowed back at her house.  He did request his catheter be removed and he would like to straight cath again.  Discussed staying today so he can meet with  " tomorrow as they may be able to [facility] [place].  Initially did not want to do this but now has nowhere else to go.    Physical Exam   Vital Signs: Temp: 97  F (36.1  C) Temp src: Tympanic BP: (!) 85/56 (Manual BP taken) Pulse: 61   Resp: 16 SpO2: 100 % O2 Device: None (Room air)    Weight: 134 lbs 9.6 oz    Constitutional: awake, alert, cooperative, no apparent distress, appears older than stated age, and thin  Eyes: pupils equal, round and reactive to light, extra-ocular muscles intact, and conjunctiva normal  ENT: normocephalic, atraumatic  Respiratory: no increased work of breathing, decreased air exchange throughout with faint end expiratory wheezing  Cardiovascular: regular rate and rhythm, normal S1 and S2, no murmur noted but very distant heart sounds and no edema  GI: normal bowel sounds, soft, non-distended, and non-tender  Skin: bruises on arms but no redness, warmth, or swelling  Musculoskeletal: no lower extremity pitting edema present, there is no redness, warmth, or swelling of the joints, full range of motion noted, motor strength is 5 out of 5 all extremities bilaterally  Neurologic: Mental Status Exam:  Fund of Knowledge:  normal  Attention/Concentration:  normal  Language:  normal  Cranial Nerves:  cranial nerves II-XII are grossly intact  Motor Exam:  Motor exam is symmetrical 5 out of 5 all extremities bilaterally  Neuropsychiatric: General: normal, calm, and normal eye contact  Level of consciousness: alert / normal  Affect: normal  Orientation: oriented to self, place, time and situation  Memory and insight: normal and thought process normal but lacks insight    Medical Decision Making             Data     I have personally reviewed the following data over the past 24 hrs:    N/A  \   N/A   / N/A     132 (L) 96 (L) 18.1 /  99   3.5 28 0.63 (L) \

## 2025-03-24 ENCOUNTER — APPOINTMENT (OUTPATIENT)
Dept: PHYSICAL THERAPY | Facility: OTHER | Age: 75
End: 2025-03-24
Payer: COMMERCIAL

## 2025-03-24 ENCOUNTER — APPOINTMENT (OUTPATIENT)
Dept: OCCUPATIONAL THERAPY | Facility: OTHER | Age: 75
End: 2025-03-24
Payer: COMMERCIAL

## 2025-03-24 LAB
ANION GAP SERPL CALCULATED.3IONS-SCNC: 10 MMOL/L (ref 7–15)
BUN SERPL-MCNC: 12 MG/DL (ref 8–23)
CALCIUM SERPL-MCNC: 8.1 MG/DL (ref 8.8–10.4)
CHLORIDE SERPL-SCNC: 97 MMOL/L (ref 98–107)
CREAT SERPL-MCNC: 0.66 MG/DL (ref 0.67–1.17)
EGFRCR SERPLBLD CKD-EPI 2021: >90 ML/MIN/1.73M2
ERYTHROCYTE [DISTWIDTH] IN BLOOD BY AUTOMATED COUNT: 14 % (ref 10–15)
GLUCOSE SERPL-MCNC: 96 MG/DL (ref 70–99)
HCO3 SERPL-SCNC: 28 MMOL/L (ref 22–29)
HCT VFR BLD AUTO: 32.7 % (ref 40–53)
HGB BLD-MCNC: 11.6 G/DL (ref 13.3–17.7)
MAGNESIUM SERPL-MCNC: 0.8 MG/DL (ref 1.7–2.3)
MCH RBC QN AUTO: 35.2 PG (ref 26.5–33)
MCHC RBC AUTO-ENTMCNC: 35.5 G/DL (ref 31.5–36.5)
MCV RBC AUTO: 99 FL (ref 78–100)
PHOSPHATE SERPL-MCNC: 2.1 MG/DL (ref 2.5–4.5)
PLATELET # BLD AUTO: 142 10E3/UL (ref 150–450)
POTASSIUM SERPL-SCNC: 3.5 MMOL/L (ref 3.4–5.3)
RBC # BLD AUTO: 3.3 10E6/UL (ref 4.4–5.9)
SODIUM SERPL-SCNC: 135 MMOL/L (ref 135–145)
WBC # BLD AUTO: 5.3 10E3/UL (ref 4–11)

## 2025-03-24 PROCEDURE — 97530 THERAPEUTIC ACTIVITIES: CPT | Mod: GO | Performed by: OCCUPATIONAL THERAPIST

## 2025-03-24 PROCEDURE — 97116 GAIT TRAINING THERAPY: CPT | Mod: GP

## 2025-03-24 PROCEDURE — 250N000011 HC RX IP 250 OP 636: Performed by: FAMILY MEDICINE

## 2025-03-24 PROCEDURE — 120N000001 HC R&B MED SURG/OB

## 2025-03-24 PROCEDURE — 36415 COLL VENOUS BLD VENIPUNCTURE: CPT | Performed by: FAMILY MEDICINE

## 2025-03-24 PROCEDURE — 84100 ASSAY OF PHOSPHORUS: CPT | Performed by: FAMILY MEDICINE

## 2025-03-24 PROCEDURE — 250N000013 HC RX MED GY IP 250 OP 250 PS 637: Performed by: INTERNAL MEDICINE

## 2025-03-24 PROCEDURE — 83735 ASSAY OF MAGNESIUM: CPT | Performed by: FAMILY MEDICINE

## 2025-03-24 PROCEDURE — 250N000013 HC RX MED GY IP 250 OP 250 PS 637: Performed by: FAMILY MEDICINE

## 2025-03-24 PROCEDURE — 85027 COMPLETE CBC AUTOMATED: CPT | Performed by: FAMILY MEDICINE

## 2025-03-24 PROCEDURE — 80048 BASIC METABOLIC PNL TOTAL CA: CPT | Performed by: FAMILY MEDICINE

## 2025-03-24 PROCEDURE — 250N000011 HC RX IP 250 OP 636: Performed by: INTERNAL MEDICINE

## 2025-03-24 PROCEDURE — 99232 SBSQ HOSP IP/OBS MODERATE 35: CPT | Performed by: FAMILY MEDICINE

## 2025-03-24 RX ORDER — MAGNESIUM SULFATE HEPTAHYDRATE 40 MG/ML
4 INJECTION, SOLUTION INTRAVENOUS ONCE
Status: COMPLETED | OUTPATIENT
Start: 2025-03-24 | End: 2025-03-24

## 2025-03-24 RX ADMIN — MAGNESIUM SULFATE HEPTAHYDRATE 4 G: 4 INJECTION, SOLUTION INTRAVENOUS at 07:50

## 2025-03-24 RX ADMIN — Medication 1 PACKET: at 22:48

## 2025-03-24 RX ADMIN — TAMSULOSIN HYDROCHLORIDE 0.8 MG: 0.4 CAPSULE ORAL at 22:57

## 2025-03-24 RX ADMIN — Medication 1 PACKET: at 12:19

## 2025-03-24 RX ADMIN — CEFTRIAXONE SODIUM 1 G: 1 INJECTION, POWDER, FOR SOLUTION INTRAMUSCULAR; INTRAVENOUS at 22:50

## 2025-03-24 RX ADMIN — Medication 1 PACKET: at 15:42

## 2025-03-24 RX ADMIN — Medication 1 PACKET: at 07:51

## 2025-03-24 ASSESSMENT — ACTIVITIES OF DAILY LIVING (ADL)
ADLS_ACUITY_SCORE: 50
ADLS_ACUITY_SCORE: 53
ADLS_ACUITY_SCORE: 50
ADLS_ACUITY_SCORE: 46
ADLS_ACUITY_SCORE: 50
ADLS_ACUITY_SCORE: 48
ADLS_ACUITY_SCORE: 53
ADLS_ACUITY_SCORE: 48
ADLS_ACUITY_SCORE: 53
ADLS_ACUITY_SCORE: 48
ADLS_ACUITY_SCORE: 48
ADLS_ACUITY_SCORE: 53
ADLS_ACUITY_SCORE: 50
ADLS_ACUITY_SCORE: 53
ADLS_ACUITY_SCORE: 46
ADLS_ACUITY_SCORE: 48
ADLS_ACUITY_SCORE: 48
ADLS_ACUITY_SCORE: 53
ADLS_ACUITY_SCORE: 48

## 2025-03-24 NOTE — PLAN OF CARE
"Goal Outcome Evaluation:      Plan of Care Reviewed With: patient    Overall Patient Progress: improvingOverall Patient Progress: improving         Pt a&o, VSS, afebrile. Pt reports he straight caths but also has been urinating on the toilet. Pt reported that he will straight cath after breakfast. At 0930, Pt spontaneously voided 100ml, and straight cath was 1100 mls. Pt refused bladder scan at that time. Pt periodically has the urge to void but will typically \"only have a little come out.\" Pt stated he typically only straight caths after breakfast and after dinner. Pt agreed to be bladder scanned at 1600, after attempting to void, with 537 mls on bladder scan. After a conversation with MD Mobley pt more agreeable to straight cath more often. At 1640 pt straight cath with 600 mls out. Pt declined bladder scan at this tiime because \"you just did it,\" Pt denies pain. Educated pt on importance of bed alarm and getting up with assistance due to having IV mag replacement this AM and generalized weakness, should have assistance with IV pole and line. Pt wanting alarms off because \"I can do it all myself\". After educating pt, was more understanding with why we have safety factors in place.     /72 (BP Location: Left arm, Patient Position: Semi-Willoughby's, Cuff Size: Adult Small)   Pulse 62   Temp 98.2  F (36.8  C) (Tympanic)   Resp 18   Ht 1.93 m (6' 4\")   Wt 61.1 kg (134 lb 9.6 oz)   SpO2 97%   BMI 16.38 kg/m      "

## 2025-03-24 NOTE — PROGRESS NOTES
:    Met with patient to discuss discharge planning needs. Patient reports that someone has been helping him get into LTC at [facility]. He stated that his ex-wife would know. Patient reported he was agreeable to SNF at this time.    Pt/family was given the Medicare Compare list for SNF, with associated star ratings to assist with choice for referrals/discharge planning Yes    Education was given to pt/family that star ratings are updated/maintained by Medicare and can be reviewed by visiting www.medicare.gov Yes    Referral sent to [facility] as requested by patient.    Called patients ex-wife and she reported that patient just started talking to [employer] from [employer] [facility].    Called [facility] and they have not received an application for patient and report having a year long waitlist.     JODY Sen on 3/24/2025 at 2:38 PM    Left voicemail message with [employer] from [employer].     Spoke with Ayla at [facility] and they are unable to take patient at this time.     JODY Sen on 3/24/2025 at 3:56 PM

## 2025-03-24 NOTE — PROGRESS NOTES
03/24/25 1410   Appointment Info   Signing Clinician's Name / Credentials (PT) Jane Turner, PT, DPT   Gait Training   Gait Training Minutes (02201) 30   Symptoms Noted During/After Treatment (Gait Training) fatigue   Treatment Detail/Skilled Intervention Patient was lying in bed up PT entering the room. Patient was agreeable to therapy, but reports being tired. Patient reports no pain. Patient was mod independent in order to complete supine to sit. Patient donned  socks sitting EOB. Patient stood independently, robe and gait belt were donned in standing. Patient ambulated 400 feet with CGA using FWW. Upon return to room patient ambulated to the recliner.  Patient tolerated therapy session fair. Patient was fatigued at the end of therapy session. Patient is impulsive and doesn't believe he is a fall risk. Patient ambulates with a bradykinetic gait speed. Patient demonstrates a short step length with good foot clearance. Patient's is unsteady when walking and knees appear to buckle occasionally. Patient required 1 standing short therapeutic rest break. Patient would benefit from continued skilled physical therapy in order to improve strength, balance endurance to facilitate return to PLOF. Patient was reclined in chair with call light in reach and posey alarm on upon PT exiting the room.   Distance in Feet 400   [place] Level (Gait Training) contact guard   Physical Assistance Level (Gait Training) supervision   Weight Bearing (Gait Training) full weight-bearing   Assistive Device (Gait Training) rolling walker   Pattern Analysis (Gait Training) 3-point gait   Gait Analysis Deviations decreased reagan;decreased step length   Impairments (Gait Analysis/Training) balance impaired;strength decreased   PT Discharge Planning   PT Plan Continue PT   PT Discharge Recommendation (DC Rec) Long term care facility   PT Rationale for DC Rec Patient would benefit from continued skilled physical therapy in order  to improve strength, balance and endurance to facilitate return to PLOF.   PT Brief overview of current status Patient is mod independent with bed mobility. Patient ambulated 400 feet with CGA using FWW. Patient was SBA for transfers. Patient is impulsive.   PT Total Distance Amb During Session (feet) 400   PT Equipment Needed at Discharge walker, rolling   Physical Therapy Time and Intention   Timed Code Treatment Minutes 30   Total Session Time (sum of timed and untimed services) 30

## 2025-03-24 NOTE — PROGRESS NOTES
Patient has been getting up every couple of hours to urinate, only urinates a small amount. Patient still refusing bladder scan to see if he is retaining, states he will straight cath after breakfast.

## 2025-03-24 NOTE — PROGRESS NOTES
"Pt refusing 1000 medications at this time. Pt stating he \"doesn't want to take them.\" Educated pt on medications and still continued to decline medications. Pt stating \"I want to get out of here.\" But willing to stay at this time.   "

## 2025-03-24 NOTE — PLAN OF CARE
"Goal Outcome Evaluation: Patients vss. /65 (BP Location: Left arm, Patient Position: Semi-Willoughby's, Cuff Size: Adult Small)   Pulse 57   Temp 97.6  F (36.4  C) (Tympanic)   Resp 16   Ht 1.93 m (6' 4\")   Wt 61.1 kg (134 lb 9.6 oz)   SpO2 96%   BMI 16.38 kg/m   Denies pain. Per patient, straight cathed around 1800. Has had the urge to void but is unable to. No discomfort or distention. Patient declining bladder scan at this time, states he will just straight cath again in the morning.                       "

## 2025-03-24 NOTE — PROGRESS NOTES
Clinical Nutrition / Initial Assessment     Reason for Assessment:  Screened at nutritional risk due to:  Recent weight loss without trying    Assessment:   Client History:  pt admitted with severe sepsis, UTI, hx of alcohol abuse, failure to thrive at home. He lives with his ex wife. She does not cook for him and he has a difficult time cooking for himself. He would often only eat every 3 days or so. Cereal, canned soup, etc whatever is easy to make. His appetite has been down as well. He believes he is not allowed to go back to live with his ex wife and is awaiting a safe discharge plan. He is willing to try some Ensure to help maintain/promote his nutrition status. He reported ~50# wt loss in past year. Noted 15% wt loss in past 8 month per chart review.   Diet Order:  regular   Oral Intake:  decreased at home but has been adequate here where meals are provided  Supplement Intake:  ensure daily at lunch per his request  Weight:   Wt Readings from Last 10 Encounters:   03/23/25 61.1 kg (134 lb 9.6 oz)   07/10/24 72.3 kg (159 lb 6.4 oz)   07/03/24 70.8 kg (156 lb)   06/13/24 70.8 kg (156 lb)   12/14/21 83.9 kg (185 lb)   12/12/21 83.9 kg (185 lb)   03/04/21 87.1 kg (192 lb)   02/25/21 84.6 kg (186 lb 9.6 oz)   11/12/20 79.4 kg (175 lb)   09/28/20 79.6 kg (175 lb 6.4 oz)    Body mass index is 16.38 kg/m .    Malnutrition Criteria:  (Need to have 2 indicators to qualify recommendation)  Energy Intake:  Social or Environmental Severe: </= 50% of estimated energy requirement for >/= 1 month  Interpretation of Weight Loss:  Social or Environmental Severe:   >10% in 6 months  Physical Findings:  Chronic Body Fat Loss:  severe and Chronic Muscle Mass Loss:  severe  Reduced  Strength:  Not Measured    Recommended Nutrition Diagnosis:   Severe Malnutrition in the context of social or environmental Circumstances - based on AND/ASPEN Clinical Characterstics of Malnutrition May 2012    Nutrition Education: Nutrition  education will be provided as appropriate.    Nutrition Diagnosis: Weight:  weight loss related to inadequate energy intakes as evidenced by inability to cook for himself and no assistance from his ex wife/roommate and 15% wt loss in past 8 months with BMI under 20.    Intervention:  Nutrition Prescription:     Nutrition Intervention(s):Recommended general, healthful diet  1. Meals and Snacks: regular meals  2. Medical Food Supplement: Ensure daily at lunch per his preference     Nutrition Goal(s):  1. Pt will continue to consume 75% or more of meals   2. Pt will not have unplanned wt loss during hospitalization    Monitoring and Evaluation:   Food Intake, diet tolerance, weights     Discharge Recommendation:   Nutrition Discharge Planning  Safe discharge plan with consistent meals. Consider supplements to help maintain nutrition and strength.     RD will reassess in within 1-5 days or sooner.  Fabiana Saldivar RD on 3/24/2025 at 12:33 PM

## 2025-03-24 NOTE — PROGRESS NOTES
[facility]    Medicine Progress Note - Hospitalist Service    Date of Admission:  3/21/2025    Assessment & Plan   This 73 yo male with a PMH significant for bladder cancer with urinary retention requiring self cath, hyperlipidemia, COPD and alcohol dependence who presented to the ER with complaints of progressive weakness over the past year.  He is unable to be managed at home safely any longer.  ER evaluation found very low mag at 0.8, , , bili 2.9, lactic acid 3.1, troponin 30 decreased to 26, UA positive for blood, leukocyte esterase, >182 WBCs, many bacteria.  He was treated with sepsis fluids 30 ml/kg, mag replaced and started on Rocephin.  He is admitted for continued cares of sepsis due to UTI.    Principal Problem:    Severe sepsis with acute organ dysfunction (H) - hyperlactatemia due to UTI    Acute cystitis with hematuria    Assessment: Acute    Plan: Likely caused by the need to self cath for urinary retention.  Lactic acid normalized after initial treatment.  The day after admission the patient was having urinary frequency and bladder scan showed over 1 L of urine present that the patient has been unable to void.  Pearce catheter placed but he wanted it removed the next day and resumed straight cath.  Continue finasteride and tamsulosin.  With increased weakness and inability to manage at home any longer he will require a rehab stay.  UC positive for Streptococcus agalactiae/Group B Streptococcus sensitive to ceftriaxone so continue, currently day 3/7 for complicated UTI.  Blood cultures negative at 3 days.    Active Problems:    Chronic obstructive pulmonary disease, unspecified COPD type (H)    Assessment: Stable    Plan: Continues to smoke but declines a nicotine patch during his hospitalization as he got a rash the last time and the first time he just kept smoking with the patch on so it did not help at all.  Not on regular medications but may use DuoNeb every 4  "hours if needed.      Alcohol dependence with unspecified alcohol-induced disorder (H)    Assessment: Stable    Plan: Unclear if he has been drinking recently as he is a poor historian but there have been no signs of withdrawal.  Continue to monitor for any concerns.      Personal history of malignant neoplasm of bladder    Assessment: In remission    Plan: Has prostatic hyperplasia also with urinary retention requiring self cath.  Continue to follow with urology annually as he has been.      Mixed hyperlipidemia    Assessment: Chronic    Plan: Last lipid profile in this chart from 9/20/2023 but [facility].  Taking Lipitor 20 mg daily and will continue.  Has elevated LFT's but this is likely chronic as it was listed on his problem list 9 months ago.      Hypomagnesemia    Hypophosphatemia    Assessment: Replaced    Plan: Given 4 gm mag sulfate with improvement in his level to 2.3.  Was low again on 3/24 so given another 4 gm.  Recheck in AM.  Phos also slightly low so replace.        Hypokalemia    Assessment: Acute    Plan: Replaced po and recheck improved.  Repeat check in AM and replace as needed.          Diet: Combination Diet Regular Diet Adult  Snacks/Supplements Adult: Ensure Enlive; With Meals    DVT Prophylaxis: Pneumatic Compression Devices  Pearce Catheter: Not present  Lines: None     Cardiac Monitoring: None  Code Status: No CPR- Do NOT Intubate      Clinically Significant Risk Factors         # Hyponatremia: Lowest Na = 132 mmol/L in last 2 days, will monitor as appropriate  # Hypochloremia: Lowest Cl = 96 mmol/L in last 2 days, will monitor as appropriate    # Hypomagnesemia: Lowest Mg = 0.8 mg/dL in last 2 days, will replace as needed                  # Cachexia: Estimated body mass index is 16.38 kg/m  as calculated from the following:    Height as of this encounter: 1.93 m (6' 4\").    Weight as of this encounter: 61.1 kg (134 lb 9.6 oz)., PRESENT " ON ADMISSION  # Severe Malnutrition: based on nutrition assessment and treatment provided per dietitian's recommendations., PRESENT ON ADMISSION          Social Drivers of Health    Housing Stability: High Risk (3/21/2025)    Housing Stability     Do you have housing? : Yes     Are you worried about losing your housing?: Yes   Tobacco Use: High Risk (7/10/2024)    Patient History     Smoking Tobacco Use: Every Day     Smokeless Tobacco Use: Never   Alcohol Use: Alcohol Misuse (6/15/2019)    Received [facility]    AUDIT-C     Frequency of Alcohol Consumption: 4 or more times a week     Average Number of Drinks: 5 or 6   Transportation Needs: High Risk (3/21/2025)    Transportation Needs     Within the past 12 months, has lack of transportation kept you from medical appointments, getting your medicines, non-medical meetings or appointments, work, or from getting things that you need?: Yes   Physical Activity: Unknown (6/13/2024)    Exercise Vital Sign     Days of Exercise per Week: 2 days   Social Connections: Unknown (6/13/2024)    Social Connection and Isolation Panel [NHANES]     Frequency of Social Gatherings with Friends and Family: Once a week          Disposition Plan     Medically Ready for Discharge: Anticipated Today             Coty Mobley MD  Hospitalist Service  [facility]  Securely message with [employer] (more info)  Text page via [facility] Paging/Directory   ______________________________________________________________________    Interval History   Feeling fine.  Frustrated that his ex-wife will not allow him to go back home.  Not sure how he can get his belongings.  Not sure where he will go.  States that the house is hers but everything in it is his.    Physical Exam   Vital Signs: Temp: 98.2  F (36.8  C) Temp src: Tympanic BP: 125/72 Pulse: 62   Resp: 18 SpO2: 97 % O2 Device: None (Room air)    Weight: 134 lbs 9.6 oz    Constitutional: awake, alert,  cooperative, no apparent distress, appears older than stated age, and thin  Eyes: pupils equal, round and reactive to light, extra-ocular muscles intact, and conjunctiva normal  ENT: normocephalic, atraumatic  Respiratory: no increased work of breathing, decreased air exchange throughout but clear  Cardiovascular: regular rate and rhythm, normal S1 and S2, no murmur noted but very distant heart sounds and no edema  GI: normal bowel sounds, soft, non-distended, and non-tender  Skin: bruises on arms improving  Musculoskeletal: no lower extremity pitting edema present, there is no redness, warmth, or swelling of the joints, full range of motion noted, motor strength is 5 out of 5 all extremities bilaterally  Neurologic: Mental Status Exam:  Fund of Knowledge:  normal  Attention/Concentration:  normal  Language:  normal  Cranial Nerves:  cranial nerves II-XII are grossly intact  Motor Exam:  Motor exam is symmetrical 5 out of 5 all extremities bilaterally  Neuropsychiatric: General: normal, calm, and normal eye contact  Level of consciousness: alert / normal  Affect: normal  Orientation: oriented to self, place, time and situation  Memory and insight: normal and thought process normal but lacks insight    Medical Decision Making             Data

## 2025-03-24 NOTE — PROGRESS NOTES
Patient states he straight cathed himself around 1800 and threw away the used catheter. Patient states he emptied his bladder into the toilet. Bladder scanned at 1830 for 0 per NST who bladder scanned the patient. Patient denies feeling the need to void and feels he emptied at this time. Patient states he uses 18 Beninese straight cath, lube, and provided cath care wipes for cleansing the area.

## 2025-03-24 NOTE — PROGRESS NOTES
Interdisciplinary Discharge Planning Note    Anticipated Discharge Date: 3/25-3/26    Anticipated Discharge Location: SNF vs LTC    Clinical Needs Before Discharge:  stable functional status    Treatment Needs After Discharge:  rehab (PT, OT, ST)    Potential Barriers to Discharge: Finding placement     JODY Sen  3/24/2025,  2:34 PM

## 2025-03-25 VITALS
WEIGHT: 134.6 LBS | DIASTOLIC BLOOD PRESSURE: 69 MMHG | HEIGHT: 76 IN | OXYGEN SATURATION: 98 % | BODY MASS INDEX: 16.39 KG/M2 | HEART RATE: 84 BPM | SYSTOLIC BLOOD PRESSURE: 115 MMHG | TEMPERATURE: 98 F | RESPIRATION RATE: 19 BRPM

## 2025-03-25 PROBLEM — E83.39 HYPOPHOSPHATEMIA: Status: ACTIVE | Noted: 2025-03-25

## 2025-03-25 LAB
ANION GAP SERPL CALCULATED.3IONS-SCNC: 8 MMOL/L (ref 7–15)
BUN SERPL-MCNC: 9.7 MG/DL (ref 8–23)
CALCIUM SERPL-MCNC: 8.5 MG/DL (ref 8.8–10.4)
CHLORIDE SERPL-SCNC: 95 MMOL/L (ref 98–107)
CREAT SERPL-MCNC: 0.69 MG/DL (ref 0.67–1.17)
EGFRCR SERPLBLD CKD-EPI 2021: >90 ML/MIN/1.73M2
GLUCOSE SERPL-MCNC: 103 MG/DL (ref 70–99)
HCO3 SERPL-SCNC: 30 MMOL/L (ref 22–29)
HOLD SPECIMEN: NORMAL
MAGNESIUM SERPL-MCNC: 1.2 MG/DL (ref 1.7–2.3)
POTASSIUM SERPL-SCNC: 3.8 MMOL/L (ref 3.4–5.3)
SODIUM SERPL-SCNC: 133 MMOL/L (ref 135–145)

## 2025-03-25 PROCEDURE — 80048 BASIC METABOLIC PNL TOTAL CA: CPT | Performed by: FAMILY MEDICINE

## 2025-03-25 PROCEDURE — 83735 ASSAY OF MAGNESIUM: CPT | Performed by: FAMILY MEDICINE

## 2025-03-25 PROCEDURE — 250N000011 HC RX IP 250 OP 636: Performed by: FAMILY MEDICINE

## 2025-03-25 PROCEDURE — 36415 COLL VENOUS BLD VENIPUNCTURE: CPT | Performed by: FAMILY MEDICINE

## 2025-03-25 PROCEDURE — 99239 HOSP IP/OBS DSCHRG MGMT >30: CPT | Performed by: FAMILY MEDICINE

## 2025-03-25 PROCEDURE — 250N000013 HC RX MED GY IP 250 OP 250 PS 637: Performed by: INTERNAL MEDICINE

## 2025-03-25 PROCEDURE — 250N000013 HC RX MED GY IP 250 OP 250 PS 637: Performed by: FAMILY MEDICINE

## 2025-03-25 RX ORDER — MAGNESIUM OXIDE 400 MG/1
400 TABLET ORAL 2 TIMES DAILY
Qty: 60 TABLET | Refills: 0 | Status: SHIPPED | OUTPATIENT
Start: 2025-03-25 | End: 2025-03-25

## 2025-03-25 RX ORDER — MAGNESIUM SULFATE HEPTAHYDRATE 40 MG/ML
4 INJECTION, SOLUTION INTRAVENOUS ONCE
Status: COMPLETED | OUTPATIENT
Start: 2025-03-25 | End: 2025-03-25

## 2025-03-25 RX ORDER — MAGNESIUM OXIDE 400 MG/1
400 TABLET ORAL 2 TIMES DAILY
Qty: 60 TABLET | Refills: 0 | Status: SHIPPED | OUTPATIENT
Start: 2025-03-25

## 2025-03-25 RX ORDER — LEVOFLOXACIN 500 MG/1
500 TABLET, FILM COATED ORAL DAILY
Qty: 4 TABLET | Refills: 0 | Status: SHIPPED | OUTPATIENT
Start: 2025-03-25 | End: 2025-03-25

## 2025-03-25 RX ORDER — LEVOFLOXACIN 500 MG/1
500 TABLET, FILM COATED ORAL DAILY
Qty: 4 TABLET | Refills: 0 | Status: SHIPPED | OUTPATIENT
Start: 2025-03-25

## 2025-03-25 RX ORDER — MAGNESIUM OXIDE 400 MG/1
400 TABLET ORAL 2 TIMES DAILY
Status: DISCONTINUED | OUTPATIENT
Start: 2025-03-25 | End: 2025-03-25 | Stop reason: HOSPADM

## 2025-03-25 RX ADMIN — Medication 400 MG: at 09:51

## 2025-03-25 RX ADMIN — ATORVASTATIN CALCIUM 20 MG: 20 TABLET, FILM COATED ORAL at 09:51

## 2025-03-25 RX ADMIN — FINASTERIDE 5 MG: 5 TABLET, FILM COATED ORAL at 09:51

## 2025-03-25 RX ADMIN — PANTOPRAZOLE SODIUM 40 MG: 40 TABLET, DELAYED RELEASE ORAL at 09:51

## 2025-03-25 RX ADMIN — MAGNESIUM SULFATE HEPTAHYDRATE 4 G: 4 INJECTION, SOLUTION INTRAVENOUS at 07:52

## 2025-03-25 RX ADMIN — DOCUSATE SODIUM 100 MG: 100 CAPSULE, LIQUID FILLED ORAL at 09:51

## 2025-03-25 ASSESSMENT — ACTIVITIES OF DAILY LIVING (ADL)
ADLS_ACUITY_SCORE: 47

## 2025-03-25 NOTE — DISCHARGE SUMMARY
"[facility]  Hospitalist Discharge Summary      Date of Admission:  3/21/2025  Date of Discharge:  3/25/2025  Discharging Provider: Coty Mobley MD  Discharge Service: Hospitalist Service    Discharge Diagnoses   Principal Problem:    Severe sepsis with acute organ dysfunction (H) - hyperlactatemia due to UTI    Date Noted: 3/21/2025  Active Problems:    Chronic obstructive pulmonary disease, unspecified COPD type (H)    Date Noted: 6/14/2024    Alcohol dependence with unspecified alcohol-induced disorder (H)    Date Noted: 6/14/2024    Personal history of malignant neoplasm of bladder    Date Noted: 6/14/2024    Mixed hyperlipidemia    Date Noted: 6/14/2024    Hypomagnesemia    Date Noted: 3/21/2025    Acute cystitis with hematuria    Date Noted: 3/22/2025    Hypokalemia    Date Noted: 3/22/2025    Hypophosphatemia    Date Noted: 3/25/2025      Clinically Significant Risk Factors     # Cachexia: Estimated body mass index is 16.38 kg/m  as calculated from the following:    Height as of this encounter: 1.93 m (6' 4\").    Weight as of this encounter: 61.1 kg (134 lb 9.6 oz).  # Severe Malnutrition: based on nutrition assessment and treatment provided per dietitian's recommendations.      Follow-ups Needed After Discharge   Follow-up Appointments       Follow-up and recommended labs and tests       Follow up with primary care provider, Soledad Person, within 7 days for hospital follow- up.  No follow up labs or test are needed.                Unresulted Labs Ordered in the Past 30 Days of this Admission       Date and Time Order Name Status Description    3/21/2025  7:16 PM Blood Culture Peripheral Blood Preliminary     3/21/2025  7:16 PM Blood Culture Peripheral Blood Preliminary         These results will be followed up by PCP.    Discharge Disposition   Discharged to home  Condition at discharge: Satisfactory    Hospital Course   This 75 yo male with a PMH significant for bladder cancer with " urinary retention requiring self cath, hyperlipidemia, COPD and alcohol dependence presented to the ER with complaints of progressive weakness over the past year.  He is unable to be managed at home safely any longer and his ex-wife will not allow him back in the home.  ER evaluation found very low mag at 0.8, , , bili 2.9, lactic acid 3.1, troponin 30 decreased to 26, UA positive for blood, leukocyte esterase, >182 WBCs, many bacteria.  Culture returned positive for Streptococcus agalactiae/Group B Streptococcus sensitive to cephalosporins and fluoroquinolones.  He was treated with sepsis fluids 30 ml/kg, mag replaced, started on Rocephin and admitted for continued cares of sepsis due to UTI.  He has improved and is ready for discharge.    Principal Problem:    Severe sepsis with acute organ dysfunction (H) - hyperlactatemia due to UTI    Acute cystitis with hematuria    Assessment: Acute    Plan: Likely caused by the need to self cath for urinary retention.  Lactic acid normalized after initial treatment.  The day after admission the patient was having urinary frequency and retention of over 1 liter so a  matta catheter was placed but he wanted it removed the next day and resumed straight cath.  Continue finasteride and tamsulosin.  With increased weakness and inability to manage at home any longer he worked with rehab during his stay and no longer required placement.  UC positive for Streptococcus agalactiae/Group B Streptococcus sensitive to ceftriaxone so completed 3 days IV and will take an additional 4 days of levofloxacin for complicated UTI.  Blood cultures negative at 3 days.    Active Problems:    Chronic obstructive pulmonary disease, unspecified COPD type (H)    Assessment: Stable    Plan: Continues to smoke but declined a nicotine patch during his hospitalization as he got a rash the last time and the first time he just kept smoking with the patch on so it did not help at all.  Not on  regular medications.      Alcohol dependence with unspecified alcohol-induced disorder (H)    Assessment: Stable    Plan: Unclear if he has been drinking recently as he is a poor historian but there have been no signs of withdrawal.  Encourage abstinence.      Personal history of malignant neoplasm of bladder    Assessment: In remission    Plan: Has prostatic hyperplasia also with urinary retention requiring self cath.  Continue to follow with urology annually as he has been.      Mixed hyperlipidemia    Assessment: Chronic    Plan: Last lipid profile in this chart from 9/20/2023 but [facility].  Taking Lipitor 20 mg daily and will continue.  Has elevated LFT's but this is likely chronic as it was listed on his problem list 9 months ago.      Hypomagnesemia    Hypophosphatemia    Assessment: Replaced    Plan: Given 4 gm mag sulfate with improvement in his level to 2.3.  Was low again on 3/24 so given another 4 gm but recheck in AM still low so given another 4 gm IV and will continue po BID.  Phos also slightly low so replaced.        Hypokalemia    Assessment: Acute    Plan: Replaced po and recheck improved.  Repeat check in AM and has remained in normal range.    Consultations This Hospital Stay   PHYSICAL THERAPY ADULT IP CONSULT  OCCUPATIONAL THERAPY ADULT IP CONSULT  CARE MANAGEMENT / SOCIAL WORK IP CONSULT  SOCIAL WORK IP CONSULT    Code Status   No CPR- Do NOT Intubate    Time Spent on this Encounter   I, Coty Mobley MD, personally saw the patient today and spent greater than 30 minutes discharging this patient.       Coty Mobley MD  [facility]  [address]  [place] [address]  Phone: [phone]  Fax: [phone]  ______________________________________________________________________    Physical Exam   Vital Signs: Temp: 97.6  F (36.4  C) Temp src: Tympanic BP: 109/66 Pulse: 63   Resp: 16 SpO2: 98 % O2 Device:  None (Room air)    Weight: 134 lbs 9.6 oz    Constitutional: awake, alert, cooperative, no apparent distress, appears older than stated age, and thin  Eyes: pupils equal, round and reactive to light, extra-ocular muscles intact, and conjunctiva normal  ENT: normocephalic, atraumatic  Respiratory: no increased work of breathing, decreased air exchange throughout but clear  Cardiovascular: regular rate and rhythm, normal S1 and S2, no murmur noted but very distant heart sounds and no edema  GI: normal bowel sounds, soft, non-distended, and non-tender  Skin: bruises on arms improving  Musculoskeletal: no lower extremity pitting edema present, muscle wasting noted, there is no redness, warmth, or swelling of the joints, full range of motion noted, motor strength is 5- out of 5 all extremities bilaterally  Neurologic: Mental Status Exam:  Fund of Knowledge:  normal  Attention/Concentration:  normal  Language:  normal  Cranial Nerves:  cranial nerves II-XII are grossly intact  Motor Exam:  Motor exam is symmetrical 5 out of 5 all extremities bilaterally  Neuropsychiatric: General: normal, calm, and normal eye contact  Level of consciousness: alert / normal  Affect: normal  Orientation: oriented to self, place, time and situation  Memory and insight: normal and thought process normal        Primary Care Physician   Soledad Person    Discharge Orders      Reason for your hospital stay    Severe sepsis due to UTI.     Follow-up and recommended labs and tests     Follow up with primary care provider, Soledad Person, within 7 days for hospital follow- up.  No follow up labs or test are needed.     Activity    Your activity upon discharge: activity as tolerated     Diet    Follow this diet upon discharge: Current Diet:Orders Placed This Encounter      Snacks/Supplements Adult: Ensure Enlive; With Meals      Combination Diet Regular Diet Adult       Significant Results and Procedures   Most Recent 3 CBC's:  Recent Labs   Lab Test  03/24/25  0554 03/22/25  0253 03/21/25  1629   WBC 5.3 6.2 7.7   HGB 11.6* 11.6* 14.9   MCV 99 100 100   * 131* 183     Most Recent 3 BMP's:  Recent Labs   Lab Test 03/25/25  0646 03/24/25  0554 03/23/25  0546   * 135 132*   POTASSIUM 3.8 3.5 3.5   CHLORIDE 95* 97* 96*   CO2 30* 28 28   BUN 9.7 12.0 18.1   CR 0.69 0.66* 0.63*   ANIONGAP 8 10 8   PRESTON 8.5* 8.1* 8.2*   * 96 99     Most Recent 2 LFT's:  Recent Labs   Lab Test 03/21/25  1629 12/12/21  1325   * 16   * 10   ALKPHOS 91 46   BILITOTAL 2.9* 0.6     7-Day Micro Results       Collected Updated Procedure Result Status      03/21/2025 1925 03/24/2025 1946 Blood Culture Peripheral Blood [[id number]]   Peripheral Blood    Preliminary result Component Value   Culture No growth after 3 days  [P]                03/21/2025 1925 03/24/2025 1946 Blood Culture Peripheral Blood [[id number]]   Peripheral Blood    Preliminary result Component Value   Culture No growth after 3 days  [P]                03/21/2025 1714 03/23/2025 1115 Urine Culture [[id number]]    (Abnormal)   Urine, Catheter    Final result Component Value   Culture >100,000 CFU/mL Streptococcus agalactiae (Group B Streptococcus)        Susceptibility        Streptococcus agalactiae (Group B Streptococcus)      BOB      Ampicillin <=0.06  Susceptible      Cefepime <=0.25  Susceptible      Ceftriaxone (meningitis) <=0.25  Susceptible      Ceftriaxone (non-meningitis) <=0.25  Susceptible      Levofloxacin 0.5  Susceptible      Penicillin 0.06  Susceptible      Tetracycline >4  Resistant      Vancomycin 0.25  Susceptible                                 Most Recent Urinalysis:  Recent Labs   Lab Test 03/21/25  1714   COLOR Orange*   APPEARANCE Slightly Cloudy*   URINEGLC Negative   URINEBILI Small*   URINEKETONE Trace*   SG 1.022   UBLD Moderate*   URINEPH 6.0   PROTEIN 200*   NITRITE Negative   LEUKEST Large*   RBCU 63*   WBCU >182*   ,   Results for orders placed or  performed during the hospital encounter of 03/21/25   CT Head w/o Contrast    Narrative    EXAM: CT HEAD W/O CONTRAST  LOCATION: [facility]  DATE: 3/21/2025    INDICATION: 74 year old male with complaints of weakness  COMPARISON: None.  TECHNIQUE: Routine CT Head without IV contrast. Multiplanar reformats. Dose reduction techniques were used.    FINDINGS:  INTRACRANIAL CONTENTS: No evidence of acute intracranial hemorrhage or mass effect. Scattered foci of decreased attenuation within the cerebral hemispheric white matter which are nonspecific, though most commonly ascribed to chronic small vessel ischemic   disease. The ventricles and sulci are prominent consistent with mild brain parenchymal volume loss. Gray-white matter differentiation is maintained. The basilar cisterns are patent.    VISUALIZED ORBITS/SINUSES/MASTOIDS: The globes are unremarkable. The partially imaged paranasal sinuses, mastoid air cells and middle ear cavities are unremarkable.     BONES/SOFT TISSUES: The visualized skull base and calvarium are unremarkable.      Impression    IMPRESSION:    1.  No evidence of acute intracranial hemorrhage or mass effect.  2.  Mild nonspecific white matter changes.  3.  Mild brain parenchymal volume loss.   CT Chest (PE) Abdomen Pelvis w Contrast    Narrative    EXAM: CT CHEST PE ABDOMEN PELVIS W CONTRAST  LOCATION: [facility]  DATE: 3/21/2025    INDICATION: Bladder cancer; weakness; indeterminate pulmonary nodule on previous outside imaging.  COMPARISON: None.  TECHNIQUE: CT chest pulmonary angiogram and routine CT abdomen pelvis with IV contrast. Arterial phase through the chest and venous phase through the abdomen and pelvis. Multiplanar reformats and MIP reconstructions were performed. Dose reduction   techniques were used.   CONTRAST: 75mL Isovue 370    FINDINGS: Patient was imaged with arm(s) at side, limiting study quality.    ANGIOGRAM CHEST: No acute  pulmonary embolism.    Thoracic aorta is normal in course and caliber. Moderate atherosclerotic calcifications.    LUNGS AND PLEURA: Mild tracheobronchial debris and diffuse bronchial wall thickening. Advanced pulmonary emphysema and minimal dependent atelectatic change. No acute airspace consolidation. Mild biapical pleural/parenchymal scarring, greatest on the   right.       Few sub-4 mm pulmonary nodules.    No pneumothorax.     No pleural effusion.     MEDIASTINUM/AXILLAE: No mediastinal/hilar lymphadenopathy.     Thoracic esophagus is unremarkable.      No axillary lymphadenopathy.    Mild bilateral gynecomastia.    Heart is normal in size. No pericardial effusion.    CORONARY ARTERY CALCIFICATION: Mild.    HEPATOBILIARY: Severe hepatic steatosis. Mild hepatomegaly.      Gallbladder is normal.    No intrahepatic or intrahepatic biliary ductal dilatation.    PANCREAS: Enhances normally. No peripancreatic inflammatory fat stranding.    SPLEEN: Enhances normally. Normal size.    ADRENAL GLANDS: Normal.    KIDNEYS: Both kidneys enhance symmetrically, without hydronephrosis.    No nephroureterolithiasis. Benign appearing renal cyst(s), in addition to other low attenuation renal lesion(s), which are too small to characterize, though statistically likely to represent simple cysts; no further follow up recommended.    Diffusely thick-walled urinary bladder, some of which may correspond to patient's reported bladder malignancy.    PELVIC ORGANS: Mild prostatomegaly.    BOWEL: Wall thickening of the proximal ascending colon, series 11 image 156, likely due to incomplete distention, though a colonic malignancy is difficult to exclude. Correlation colonoscopy is recommended on a nonemergent basis. No evidence of acute   gastrointestinal inflammation or obstruction. Normal appendix.    No intraperitoneal free fluid or free air.    LYMPH NODES: No suspicious abdominal or pelvic lymphadenopathy.    VASCULATURE: Aneurysm  dilatation of the infrarenal abdominal aorta, measuring up to 3.2 cm. Severe atheromatous disease of the abdominal aorta and branch vessels. Notably dense atherosclerotic calcification within the proximal left common iliac artery,   resulting in focal occlusion versus severe stenosis, though distal patency is preserved.    MUSCULOSKELETAL: Old posterior right rib fracture deformities. Old fracture deformities of the right L2 and L3 transverse processes.    OTHER: No additionally significant abnormalities.      Impression    IMPRESSION:  1.  No acute pulmonary embolism.  2.  No acute CT abnormality of the abdomen/pelvis.  3.  Diffusely thick-walled urinary bladder, some of which may correspond to patient's reported bladder malignancy.  4.  Wall thickening of the proximal ascending colon, likely due to incomplete distention, though a colonic malignancy is difficult to exclude. Correlation with colonoscopy is recommended on a nonemergent basis.  5.  Severe hepatic steatosis.  6.  Aneurysmal dilatation of the infrarenal abdominal aorta, measuring up to 3.2 cm.  7.  Severe atheromatous disease of the abdominal aorta and branch vessels, with focal occlusion versus severe stenosis of the proximal left common iliac artery. Distal patency is preserved.  8.  Few sub-4 mm pulmonary nodules. Follow-up is recommended according to Fleischner criteria, as detailed below.  9.  Advanced pulmonary emphysema.      ATTENTION: ABNORMAL REPORT    THE DICTATION ABOVE DESCRIBES AN ABNORMALITY FOR WHICH FOLLOWUP IS NEEDED.      Fleischner Society Recommendations for Pulmonary Nodules    Nodule size less than 6 mm:     Single or multiple nodules:     Nodules < 6 mm do not require routine follow-up, but certain patients at high risk with suspicious nodule morphology, upper lobe location, or both may warrant 12-month follow-up.         Discharge Medications   Current Discharge Medication List        START taking these medications    Details    levofloxacin (LEVAQUIN) 500 MG tablet Take 1 tablet (500 mg) by mouth daily for 4 days.  Qty: 4 tablet, Refills: 0    Associated Diagnoses: Severe sepsis with acute organ dysfunction (H); Acute cystitis with hematuria; Mixed hyperlipidemia; Benign localized prostatic hyperplasia with lower urinary tract symptoms (LUTS); Malignant neoplasm of urinary bladder, unspecified site (H)      magnesium oxide (MAG-OX) 400 MG tablet Take 1 tablet (400 mg) by mouth 2 times daily.  Qty: 60 tablet, Refills: 0    Associated Diagnoses: Severe sepsis with acute organ dysfunction (H); Acute cystitis with hematuria; Mixed hyperlipidemia; Benign localized prostatic hyperplasia with lower urinary tract symptoms (LUTS); Malignant neoplasm of urinary bladder, unspecified site (H)           CONTINUE these medications which have NOT CHANGED    Details   atorvastatin (LIPITOR) 20 MG tablet Take 20 mg by mouth daily.      finasteride (PROSCAR) 5 MG tablet Take 5 mg by mouth daily.      omeprazole (PRILOSEC) 20 MG DR capsule Take 1 capsule (20 mg) by mouth daily  Qty: 90 capsule, Refills: 3    Associated Diagnoses: Hou's esophageal ulceration      tamsulosin (FLOMAX) 0.4 MG capsule Take 0.8 mg by mouth daily.           Allergies   No Known Allergies

## 2025-03-25 NOTE — PROGRESS NOTES
[facility] DISCHARGE NOTE    Patient discharged to [facility] at 2:48 PM via wheel chair. Accompanied by other:self and staff. Discharge instructions reviewed with patient, opportunity offered to ask questions. Prescriptions sent to patients preferred pharmacy. All belongings sent with patient.    Zoraida Cuellar RN

## 2025-03-25 NOTE — PROGRESS NOTES
:    Spoke with patient about discharge planning. Discussed discharge options. At this time patient does not have friends or family to stay with. Discussed potential discharge to [facility] shelter. Patient states he would rater go to the [facility]. Patient states he would like to return home to get his belongings.    Spoke with patients ex-wife who states she does not feel safe for him to return home to get belongings. She is willing to bring some personal items and clothing and drop them off at the volunteer desk in the clinic at 1400.    Left voicemail with patients ex-wife, Joselin, giving her the list of supplies patient wants delivered to the hospital.    Theresa Oconnor on 3/25/2025 at 10:35 AM    RADHA Aranda on 3/25/2025 at 10:40 AM    :    Called [employer], and they are able to transport patient to the [facility]. They will  patient from clinic doors.    Theresa Oconnor on 3/25/2025 at 2:34 PM    RADHA Aranda on 3/25/2025 at 2:36 PM

## 2025-03-25 NOTE — PHARMACY - DISCHARGE MEDICATION RECONCILIATION AND EDUCATION
Pharmacy:  Discharge Counseling and Medication Reconciliation    Avinash Wray  [address]  [place] [address]  [phone] (home)   74 year old male  PCP: Soledad Person    Allergies: Patient has no known allergies.    Discharge Counseling:    Pharmacist met with patient (and/or family) today to review the medication portion of the After Visit Summary (with an emphasis on NEW medications) and to address patient's questions/concerns.    Summary of Education:  Levofloxacin: Discussed administration with food, spacing apart from antacids, MVI, and iron. Discussed potential for sun sensitivity while on medication. Discussed rare side effect of muscle aches/pains, management with immediate cessation of therapy and seeking medical attention. Encouraged completion of therapy.   Mag: Reviewed dosing- separate out from levaquin for absorption.  -Encouraged compliance to medications as patient had not taken any for >1 month PTA    Materials Provided:  MedCounselor sheets printed from Clinical Pharmacology on: Levaquin    Discharge Medication Reconciliation:    It has been determined that the patient has an adequate supply of medications available or which can be obtained from the patient's preferred pharmacy, which HE/SHE has confirmed as: [facility]- confirmed with RN that whomever discharges patient will bring him to pharmacy.    Thank you for the consult.    Shanelle Farr [facility]........March 25, 2025 2:26 PM

## 2025-03-25 NOTE — PROGRESS NOTES
03/24/25 0811   Appointment Info   Signing Clinician's Name / Credentials (OT) Aury Foster OTR/L   Therapeutic Activities   Therapeutic Activity Minutes (56714) 15   Symptoms noted during/after treatment fatigue   Treatment Detail/Skilled Intervention Pt needed encouragement to get up and go for a walk with therapy, pt ambulated in hallway with FWW and CGA, no complaints of pain. Pt reported he showered yesterday so declined self cares   OT Discharge Planning   OT Plan progress functional activity as able   OT Discharge Recommendation (DC Rec) Transitional Care Facility;home with home care occupational therapy   OT Rationale for DC Rec will continue to see with recommendations to follow   OT Brief overview of current status see above for treatment details   Total Session Time   Timed Code Treatment Minutes 15   Total Session Time (sum of timed and untimed services) 15

## 2025-03-25 NOTE — PLAN OF CARE
Problem: Adult Inpatient Plan of Care  Goal: Absence of Hospital-Acquired Illness or Injury  Intervention: Identify and Manage Fall Risk  Recent Flowsheet Documentation  Taken 3/24/2025 2300 by Kalpesh Olivares RN  Safety Promotion/Fall Prevention:   activity supervised   lighting adjusted   nonskid shoes/slippers when out of bed   room organization consistent   safety round/check completed  Intervention: Prevent Skin Injury  Recent Flowsheet Documentation  Taken 3/24/2025 2300 by Kalpesh Olivares RN  Body Position:   position changed independently   weight shifting   supine, head elevated  Intervention: Prevent and Manage VTE (Venous Thromboembolism) Risk  Recent Flowsheet Documentation  Taken 3/25/2025 0447 by Kalpesh Olivares RN  VTE Prevention/Management: patient refused intervention  Intervention: Prevent Infection  Recent Flowsheet Documentation  Taken 3/24/2025 2300 by Kalpesh Olivares RN  Infection Prevention:   rest/sleep promoted   single patient room provided  Goal: Optimal Comfort and Wellbeing  Intervention: Monitor Pain and Promote Comfort  Recent Flowsheet Documentation  Taken 3/24/2025 2300 by Kalpesh Olivares RN  Pain Management Interventions: declines     Problem: UTI (Urinary Tract Infection)  Goal: Improved Infection Symptoms  Outcome: Progressing     Goal Outcome Evaluation:    Patient reports feeling more to his baseline mobility status. VSS. Afebrile. Oriented x4.

## 2025-03-26 LAB
BACTERIA BLD CULT: NO GROWTH
BACTERIA BLD CULT: NO GROWTH

## 2025-03-27 LAB
ATRIAL RATE - MUSE: 122 BPM
DIASTOLIC BLOOD PRESSURE - MUSE: NORMAL MMHG
INTERPRETATION ECG - MUSE: NORMAL
P AXIS - MUSE: 82 DEGREES
PR INTERVAL - MUSE: 144 MS
QRS DURATION - MUSE: 84 MS
QT - MUSE: 366 MS
QTC - MUSE: 499 MS
R AXIS - MUSE: 81 DEGREES
SYSTOLIC BLOOD PRESSURE - MUSE: NORMAL MMHG
T AXIS - MUSE: 81 DEGREES
VENTRICULAR RATE- MUSE: 112 BPM

## 2025-04-01 ENCOUNTER — PATIENT OUTREACH (OUTPATIENT)
Dept: FAMILY MEDICINE | Facility: OTHER | Age: 75
End: 2025-04-01
Payer: MEDICARE

## 2025-04-01 NOTE — TELEPHONE ENCOUNTER
Patient Quality Outreach    Patient is due for the following:   Hospital Follow-up    Action(s) Taken:   Schedule a office visit for hospital follow-up    Type of outreach:    Message sent to outreach to schedule hospital follow-up appointment.    Questions for provider review:    None         Tahira Delgadillo RN

## 2025-04-04 ENCOUNTER — MEDICAL CORRESPONDENCE (OUTPATIENT)
Dept: HEALTH INFORMATION MANAGEMENT | Facility: OTHER | Age: 75
End: 2025-04-04

## 2025-04-30 ENCOUNTER — APPOINTMENT (OUTPATIENT)
Dept: CT IMAGING | Facility: OTHER | Age: 75
End: 2025-04-30
Payer: COMMERCIAL

## 2025-04-30 ENCOUNTER — APPOINTMENT (OUTPATIENT)
Dept: GENERAL RADIOLOGY | Facility: OTHER | Age: 75
End: 2025-04-30
Payer: COMMERCIAL

## 2025-04-30 ENCOUNTER — HOSPITAL ENCOUNTER (INPATIENT)
Facility: OTHER | Age: 75
End: 2025-04-30
Admitting: FAMILY MEDICINE
Payer: COMMERCIAL

## 2025-04-30 ENCOUNTER — APPOINTMENT (OUTPATIENT)
Dept: CT IMAGING | Facility: OTHER | Age: 75
DRG: 871 | End: 2025-04-30
Payer: COMMERCIAL

## 2025-04-30 DIAGNOSIS — N39.0 COMPLICATED UTI (URINARY TRACT INFECTION): ICD-10-CM

## 2025-04-30 DIAGNOSIS — J18.9 PNEUMONIA: ICD-10-CM

## 2025-04-30 DIAGNOSIS — A41.9 SEPSIS (H): ICD-10-CM

## 2025-04-30 DIAGNOSIS — R65.20 SEVERE SEPSIS WITH ACUTE ORGAN DYSFUNCTION (H): Primary | ICD-10-CM

## 2025-04-30 DIAGNOSIS — E83.42 HYPOMAGNESEMIA: ICD-10-CM

## 2025-04-30 DIAGNOSIS — R29.6 FALLS FREQUENTLY: ICD-10-CM

## 2025-04-30 DIAGNOSIS — E83.39 HYPOPHOSPHATEMIA: ICD-10-CM

## 2025-04-30 DIAGNOSIS — A41.9 SEVERE SEPSIS WITH ACUTE ORGAN DYSFUNCTION (H): Primary | ICD-10-CM

## 2025-04-30 DIAGNOSIS — R53.1 GENERALIZED WEAKNESS: ICD-10-CM

## 2025-04-30 LAB
ALBUMIN SERPL BCG-MCNC: 3.7 G/DL (ref 3.5–5.2)
ALBUMIN UR-MCNC: 30 MG/DL
ALP SERPL-CCNC: 123 U/L (ref 40–150)
ALT SERPL W P-5'-P-CCNC: 41 U/L (ref 0–70)
AMORPH CRY #/AREA URNS HPF: ABNORMAL /HPF
ANION GAP SERPL CALCULATED.3IONS-SCNC: 18 MMOL/L (ref 7–15)
APPEARANCE UR: ABNORMAL
AST SERPL W P-5'-P-CCNC: 83 U/L (ref 0–45)
BACTERIA #/AREA URNS HPF: ABNORMAL /HPF
BASE EXCESS BLDV CALC-SCNC: 2.9 MMOL/L (ref -3–3)
BASOPHILS # BLD MANUAL: 0 10E3/UL (ref 0–0.2)
BASOPHILS NFR BLD MANUAL: 0 %
BILIRUB SERPL-MCNC: 2 MG/DL
BILIRUB UR QL STRIP: NEGATIVE
BUN SERPL-MCNC: 13.5 MG/DL (ref 8–23)
CALCIUM SERPL-MCNC: 9.6 MG/DL (ref 8.8–10.4)
CHLORIDE SERPL-SCNC: 95 MMOL/L (ref 98–107)
COLOR UR AUTO: YELLOW
CREAT SERPL-MCNC: 0.71 MG/DL (ref 0.67–1.17)
EGFRCR SERPLBLD CKD-EPI 2021: >90 ML/MIN/1.73M2
EOSINOPHIL # BLD MANUAL: 0 10E3/UL (ref 0–0.7)
EOSINOPHIL NFR BLD MANUAL: 0 %
ERYTHROCYTE [DISTWIDTH] IN BLOOD BY AUTOMATED COUNT: 14 % (ref 10–15)
ETHANOL SERPL-MCNC: 0.02 G/DL
GLUCOSE SERPL-MCNC: 132 MG/DL (ref 70–99)
GLUCOSE UR STRIP-MCNC: NEGATIVE MG/DL
HCO3 BLDV-SCNC: 25 MMOL/L (ref 21–28)
HCO3 SERPL-SCNC: 21 MMOL/L (ref 22–29)
HCT VFR BLD AUTO: 39.5 % (ref 40–53)
HGB BLD-MCNC: 14.2 G/DL (ref 13.3–17.7)
HGB UR QL STRIP: NEGATIVE
HOLD SPECIMEN: NORMAL
HOLD SPECIMEN: NORMAL
KETONES UR STRIP-MCNC: NEGATIVE MG/DL
LACTATE SERPL-SCNC: 2.5 MMOL/L (ref 0.7–2)
LACTATE SERPL-SCNC: 5.9 MMOL/L (ref 0.7–2)
LEUKOCYTE ESTERASE UR QL STRIP: ABNORMAL
LYMPHOCYTES # BLD MANUAL: 0.7 10E3/UL (ref 0.8–5.3)
LYMPHOCYTES NFR BLD MANUAL: 5 %
MAGNESIUM SERPL-MCNC: 1.1 MG/DL (ref 1.7–2.3)
MCH RBC QN AUTO: 36.8 PG (ref 26.5–33)
MCHC RBC AUTO-ENTMCNC: 35.9 G/DL (ref 31.5–36.5)
MCV RBC AUTO: 102 FL (ref 78–100)
MONOCYTES # BLD MANUAL: 0.6 10E3/UL (ref 0–1.3)
MONOCYTES NFR BLD MANUAL: 4 %
MUCOUS THREADS #/AREA URNS LPF: PRESENT /LPF
NEUTROPHILS # BLD MANUAL: 13 10E3/UL (ref 1.6–8.3)
NEUTROPHILS NFR BLD MANUAL: 91 %
NITRATE UR QL: NEGATIVE
O2/TOTAL GAS SETTING VFR VENT: 21 %
OXYHGB MFR BLDV: 84 % (ref 70–75)
PCO2 BLDV: 32 MM HG (ref 40–50)
PH BLDV: 7.51 [PH] (ref 7.32–7.43)
PH UR STRIP: 7 [PH] (ref 5–9)
PHOSPHATE SERPL-MCNC: 1.9 MG/DL (ref 2.5–4.5)
PHOSPHATE SERPL-MCNC: 2 MG/DL (ref 2.5–4.5)
PLAT MORPH BLD: NORMAL
PLATELET # BLD AUTO: 112 10E3/UL (ref 150–450)
PO2 BLDV: 47 MM HG (ref 25–47)
POTASSIUM SERPL-SCNC: 3.6 MMOL/L (ref 3.4–5.3)
PREALB SERPL-MCNC: 16.9 MG/DL (ref 20–40)
PROCALCITONIN SERPL IA-MCNC: 0.23 NG/ML
PROT SERPL-MCNC: 6.7 G/DL (ref 6.4–8.3)
RBC # BLD AUTO: 3.86 10E6/UL (ref 4.4–5.9)
RBC MORPH BLD: NORMAL
RBC URINE: 5 /HPF
SAO2 % BLDV: 86.6 % (ref 70–75)
SODIUM SERPL-SCNC: 134 MMOL/L (ref 135–145)
SP GR UR STRIP: 1.01 (ref 1–1.03)
TROPONIN T SERPL HS-MCNC: 24 NG/L
TROPONIN T SERPL HS-MCNC: 27 NG/L
UROBILINOGEN UR STRIP-MCNC: NORMAL MG/DL
WBC # BLD AUTO: 14.3 10E3/UL (ref 4–11)
WBC URINE: 43 /HPF

## 2025-04-30 PROCEDURE — 82077 ASSAY SPEC XCP UR&BREATH IA: CPT

## 2025-04-30 PROCEDURE — 250N000013 HC RX MED GY IP 250 OP 250 PS 637

## 2025-04-30 PROCEDURE — 83735 ASSAY OF MAGNESIUM: CPT

## 2025-04-30 PROCEDURE — 84134 ASSAY OF PREALBUMIN: CPT | Performed by: FAMILY MEDICINE

## 2025-04-30 PROCEDURE — 258N000003 HC RX IP 258 OP 636

## 2025-04-30 PROCEDURE — 73502 X-RAY EXAM HIP UNI 2-3 VIEWS: CPT

## 2025-04-30 PROCEDURE — 99285 EMERGENCY DEPT VISIT HI MDM: CPT | Mod: 25

## 2025-04-30 PROCEDURE — 93010 ELECTROCARDIOGRAM REPORT: CPT | Mod: 76 | Performed by: INTERNAL MEDICINE

## 2025-04-30 PROCEDURE — 70450 CT HEAD/BRAIN W/O DYE: CPT

## 2025-04-30 PROCEDURE — 71045 X-RAY EXAM CHEST 1 VIEW: CPT

## 2025-04-30 PROCEDURE — 84155 ASSAY OF PROTEIN SERUM: CPT

## 2025-04-30 PROCEDURE — 84145 PROCALCITONIN (PCT): CPT

## 2025-04-30 PROCEDURE — 36415 COLL VENOUS BLD VENIPUNCTURE: CPT

## 2025-04-30 PROCEDURE — 84484 ASSAY OF TROPONIN QUANT: CPT

## 2025-04-30 PROCEDURE — 82805 BLOOD GASES W/O2 SATURATION: CPT

## 2025-04-30 PROCEDURE — 83605 ASSAY OF LACTIC ACID: CPT

## 2025-04-30 PROCEDURE — 84100 ASSAY OF PHOSPHORUS: CPT

## 2025-04-30 PROCEDURE — 250N000009 HC RX 250

## 2025-04-30 PROCEDURE — 81003 URINALYSIS AUTO W/O SCOPE: CPT

## 2025-04-30 PROCEDURE — 51798 US URINE CAPACITY MEASURE: CPT

## 2025-04-30 PROCEDURE — 87186 SC STD MICRODIL/AGAR DIL: CPT

## 2025-04-30 PROCEDURE — 250N000013 HC RX MED GY IP 250 OP 250 PS 637: Performed by: FAMILY MEDICINE

## 2025-04-30 PROCEDURE — 99223 1ST HOSP IP/OBS HIGH 75: CPT | Mod: AI | Performed by: FAMILY MEDICINE

## 2025-04-30 PROCEDURE — 96361 HYDRATE IV INFUSION ADD-ON: CPT

## 2025-04-30 PROCEDURE — 87040 BLOOD CULTURE FOR BACTERIA: CPT

## 2025-04-30 PROCEDURE — 71045 X-RAY EXAM CHEST 1 VIEW: CPT | Mod: 26 | Performed by: RADIOLOGY

## 2025-04-30 PROCEDURE — 73502 X-RAY EXAM HIP UNI 2-3 VIEWS: CPT | Mod: 26 | Performed by: RADIOLOGY

## 2025-04-30 PROCEDURE — 72125 CT NECK SPINE W/O DYE: CPT | Mod: 26 | Performed by: RADIOLOGY

## 2025-04-30 PROCEDURE — 96375 TX/PRO/DX INJ NEW DRUG ADDON: CPT

## 2025-04-30 PROCEDURE — 72125 CT NECK SPINE W/O DYE: CPT

## 2025-04-30 PROCEDURE — 96365 THER/PROPH/DIAG IV INF INIT: CPT

## 2025-04-30 PROCEDURE — 85018 HEMOGLOBIN: CPT

## 2025-04-30 PROCEDURE — 93005 ELECTROCARDIOGRAM TRACING: CPT

## 2025-04-30 PROCEDURE — 85007 BL SMEAR W/DIFF WBC COUNT: CPT

## 2025-04-30 PROCEDURE — 250N000011 HC RX IP 250 OP 636

## 2025-04-30 PROCEDURE — 70450 CT HEAD/BRAIN W/O DYE: CPT | Mod: 26 | Performed by: RADIOLOGY

## 2025-04-30 PROCEDURE — 120N000001 HC R&B MED SURG/OB

## 2025-04-30 PROCEDURE — 250N000011 HC RX IP 250 OP 636: Performed by: FAMILY MEDICINE

## 2025-04-30 PROCEDURE — 99285 EMERGENCY DEPT VISIT HI MDM: CPT

## 2025-04-30 RX ORDER — FLUMAZENIL 0.1 MG/ML
0.2 INJECTION, SOLUTION INTRAVENOUS
Status: ACTIVE | OUTPATIENT
Start: 2025-04-30

## 2025-04-30 RX ORDER — CEFTRIAXONE 1 G/1
1 INJECTION, POWDER, FOR SOLUTION INTRAMUSCULAR; INTRAVENOUS ONCE
Status: COMPLETED | OUTPATIENT
Start: 2025-04-30 | End: 2025-04-30

## 2025-04-30 RX ORDER — LIDOCAINE HYDROCHLORIDE 20 MG/ML
JELLY TOPICAL ONCE
Status: COMPLETED | OUTPATIENT
Start: 2025-04-30 | End: 2025-04-30

## 2025-04-30 RX ORDER — HALOPERIDOL 5 MG/ML
2.5-5 INJECTION INTRAMUSCULAR EVERY 6 HOURS PRN
Status: ACTIVE | OUTPATIENT
Start: 2025-04-30

## 2025-04-30 RX ORDER — DOXYCYCLINE 100 MG/1
100 CAPSULE ORAL ONCE
Status: COMPLETED | OUTPATIENT
Start: 2025-04-30 | End: 2025-04-30

## 2025-04-30 RX ORDER — ALBUTEROL SULFATE 0.83 MG/ML
3 SOLUTION RESPIRATORY (INHALATION)
Status: ACTIVE | OUTPATIENT
Start: 2025-04-30

## 2025-04-30 RX ORDER — DIAZEPAM 5 MG/1
10 TABLET ORAL EVERY 30 MIN PRN
Status: ACTIVE | OUTPATIENT
Start: 2025-04-30

## 2025-04-30 RX ORDER — NALOXONE HYDROCHLORIDE 0.4 MG/ML
0.2 INJECTION, SOLUTION INTRAMUSCULAR; INTRAVENOUS; SUBCUTANEOUS
Status: ACTIVE | OUTPATIENT
Start: 2025-04-30

## 2025-04-30 RX ORDER — CEFTRIAXONE 1 G/1
1 INJECTION, POWDER, FOR SOLUTION INTRAMUSCULAR; INTRAVENOUS EVERY 24 HOURS
Status: DISPENSED | OUTPATIENT
Start: 2025-05-01 | End: 2025-05-08

## 2025-04-30 RX ORDER — AMOXICILLIN 250 MG
2 CAPSULE ORAL 2 TIMES DAILY PRN
Status: ACTIVE | OUTPATIENT
Start: 2025-04-30

## 2025-04-30 RX ORDER — TAMSULOSIN HYDROCHLORIDE 0.4 MG/1
0.8 CAPSULE ORAL DAILY
Status: DISCONTINUED | OUTPATIENT
Start: 2025-04-30 | End: 2025-04-30

## 2025-04-30 RX ORDER — ACETAMINOPHEN 325 MG/1
650 TABLET ORAL EVERY 4 HOURS PRN
Status: ACTIVE | OUTPATIENT
Start: 2025-04-30

## 2025-04-30 RX ORDER — DIAZEPAM 10 MG/2ML
5-10 INJECTION, SOLUTION INTRAMUSCULAR; INTRAVENOUS EVERY 30 MIN PRN
Status: ACTIVE | OUTPATIENT
Start: 2025-04-30

## 2025-04-30 RX ORDER — LIDOCAINE 40 MG/G
CREAM TOPICAL
Status: ACTIVE | OUTPATIENT
Start: 2025-04-30

## 2025-04-30 RX ORDER — CLONIDINE HYDROCHLORIDE 0.1 MG/1
0.1 TABLET ORAL EVERY 8 HOURS
Status: CANCELLED | OUTPATIENT
Start: 2025-04-30

## 2025-04-30 RX ORDER — ONDANSETRON 2 MG/ML
4 INJECTION INTRAMUSCULAR; INTRAVENOUS EVERY 6 HOURS PRN
Status: ACTIVE | OUTPATIENT
Start: 2025-04-30

## 2025-04-30 RX ORDER — SODIUM CHLORIDE 9 MG/ML
INJECTION, SOLUTION INTRAVENOUS CONTINUOUS
Status: ACTIVE | OUTPATIENT
Start: 2025-04-30

## 2025-04-30 RX ORDER — CALCIUM CARBONATE 500 MG/1
1000 TABLET, CHEWABLE ORAL 4 TIMES DAILY PRN
Status: ACTIVE | OUTPATIENT
Start: 2025-04-30

## 2025-04-30 RX ORDER — FINASTERIDE 5 MG/1
5 TABLET, FILM COATED ORAL DAILY
Status: DISPENSED | OUTPATIENT
Start: 2025-04-30

## 2025-04-30 RX ORDER — MULTIPLE VITAMINS W/ MINERALS TAB 9MG-400MCG
1 TAB ORAL DAILY
Status: DISPENSED | OUTPATIENT
Start: 2025-04-30

## 2025-04-30 RX ORDER — OLANZAPINE 5 MG/1
5-10 TABLET, ORALLY DISINTEGRATING ORAL EVERY 6 HOURS PRN
Status: ACTIVE | OUTPATIENT
Start: 2025-04-30

## 2025-04-30 RX ORDER — MAGNESIUM SULFATE HEPTAHYDRATE 40 MG/ML
4 INJECTION, SOLUTION INTRAVENOUS ONCE
Status: COMPLETED | OUTPATIENT
Start: 2025-04-30 | End: 2025-04-30

## 2025-04-30 RX ORDER — NALOXONE HYDROCHLORIDE 0.4 MG/ML
0.4 INJECTION, SOLUTION INTRAMUSCULAR; INTRAVENOUS; SUBCUTANEOUS
Status: ACTIVE | OUTPATIENT
Start: 2025-04-30

## 2025-04-30 RX ORDER — AMOXICILLIN 250 MG
1 CAPSULE ORAL 2 TIMES DAILY PRN
Status: ACTIVE | OUTPATIENT
Start: 2025-04-30

## 2025-04-30 RX ORDER — GUAIFENESIN/DEXTROMETHORPHAN 100-10MG/5
10 SYRUP ORAL EVERY 4 HOURS PRN
Status: ACTIVE | OUTPATIENT
Start: 2025-04-30

## 2025-04-30 RX ORDER — ONDANSETRON 4 MG/1
4 TABLET, ORALLY DISINTEGRATING ORAL EVERY 6 HOURS PRN
Status: ACTIVE | OUTPATIENT
Start: 2025-04-30

## 2025-04-30 RX ORDER — ENOXAPARIN SODIUM 100 MG/ML
30 INJECTION SUBCUTANEOUS EVERY 24 HOURS
Status: DISPENSED | OUTPATIENT
Start: 2025-04-30

## 2025-04-30 RX ORDER — TAMSULOSIN HYDROCHLORIDE 0.4 MG/1
0.4 CAPSULE ORAL EVERY EVENING
Status: DISPENSED | OUTPATIENT
Start: 2025-04-30

## 2025-04-30 RX ORDER — FOLIC ACID 1 MG/1
1 TABLET ORAL DAILY
Status: DISPENSED | OUTPATIENT
Start: 2025-04-30

## 2025-04-30 RX ORDER — ACETAMINOPHEN 650 MG/1
650 SUPPOSITORY RECTAL EVERY 4 HOURS PRN
Status: ACTIVE | OUTPATIENT
Start: 2025-04-30

## 2025-04-30 RX ORDER — OXYCODONE HYDROCHLORIDE 5 MG/1
5 TABLET ORAL EVERY 4 HOURS PRN
Status: ACTIVE | OUTPATIENT
Start: 2025-04-30

## 2025-04-30 RX ORDER — DOXYCYCLINE 100 MG/10ML
100 INJECTION, POWDER, LYOPHILIZED, FOR SOLUTION INTRAVENOUS EVERY 12 HOURS
Status: DISPENSED | OUTPATIENT
Start: 2025-05-01 | End: 2025-05-08

## 2025-04-30 RX ADMIN — FOLIC ACID 1 MG: 1 TABLET ORAL at 20:08

## 2025-04-30 RX ADMIN — THIAMINE HCL TAB 100 MG 100 MG: 100 TAB at 20:09

## 2025-04-30 RX ADMIN — TAMSULOSIN HYDROCHLORIDE 0.4 MG: 0.4 CAPSULE ORAL at 20:08

## 2025-04-30 RX ADMIN — Medication 1 PACKET: at 23:56

## 2025-04-30 RX ADMIN — Medication 1 TABLET: at 20:08

## 2025-04-30 RX ADMIN — SODIUM CHLORIDE 1824 ML: 9 INJECTION, SOLUTION INTRAVENOUS at 15:11

## 2025-04-30 RX ADMIN — MAGNESIUM SULFATE HEPTAHYDRATE 4 G: 4 INJECTION, SOLUTION INTRAVENOUS at 16:42

## 2025-04-30 RX ADMIN — Medication 1 PACKET: at 16:42

## 2025-04-30 RX ADMIN — SODIUM CHLORIDE: 9 INJECTION, SOLUTION INTRAVENOUS at 18:51

## 2025-04-30 RX ADMIN — ENOXAPARIN SODIUM 30 MG: 30 INJECTION SUBCUTANEOUS at 23:02

## 2025-04-30 RX ADMIN — Medication 1 PACKET: at 20:15

## 2025-04-30 RX ADMIN — SODIUM CHLORIDE: 9 INJECTION, SOLUTION INTRAVENOUS at 17:28

## 2025-04-30 RX ADMIN — DOXYCYCLINE 100 MG: 100 CAPSULE ORAL at 17:28

## 2025-04-30 RX ADMIN — LIDOCAINE HYDROCHLORIDE: 20 JELLY TOPICAL at 15:21

## 2025-04-30 RX ADMIN — FINASTERIDE 5 MG: 5 TABLET, FILM COATED ORAL at 20:08

## 2025-04-30 RX ADMIN — CEFTRIAXONE SODIUM 1 G: 1 INJECTION, POWDER, FOR SOLUTION INTRAMUSCULAR; INTRAVENOUS at 16:04

## 2025-04-30 ASSESSMENT — LIFESTYLE VARIABLES
AUDITORY DISTURBANCES: NOT PRESENT
PAROXYSMAL SWEATS: NO SWEAT VISIBLE
NAUSEA AND VOMITING: NO NAUSEA AND NO VOMITING
ANXIETY: NO ANXIETY, AT EASE
HEADACHE, FULLNESS IN HEAD: NOT PRESENT
AGITATION: NORMAL ACTIVITY
ORIENTATION AND CLOUDING OF SENSORIUM: CANNOT DO SERIAL ADDITIONS OR IS UNCERTAIN ABOUT DATE
VISUAL DISTURBANCES: NOT PRESENT
TREMOR: 2
TOTAL SCORE: 3

## 2025-04-30 ASSESSMENT — ACTIVITIES OF DAILY LIVING (ADL)
ADLS_ACUITY_SCORE: 51
ADLS_ACUITY_SCORE: 58
ADLS_ACUITY_SCORE: 58
ADLS_ACUITY_SCORE: 56
ADLS_ACUITY_SCORE: 51
ADLS_ACUITY_SCORE: 58
ADLS_ACUITY_SCORE: 52
ADLS_ACUITY_SCORE: 51
ADLS_ACUITY_SCORE: 51

## 2025-04-30 ASSESSMENT — ENCOUNTER SYMPTOMS
APPETITE CHANGE: 1
ABDOMINAL PAIN: 1
WEAKNESS: 1

## 2025-04-30 NOTE — ED PROVIDER NOTES
"  History     Chief Complaint   Patient presents with    Fall    Generalized Weakness     The history is provided by the patient and medical records.     Avinash Wray is a 74 year old male who presents to the emergency department via ambulance from the [facility].  Patient has been residing at the formerly [facility] since being discharged from the hospital on 25 March.  CRT and community paramedic have been following up with patient, CRT called the ambulance today on patient's behalf.  Patient has been weak, unable to get out of bed, had fallen yesterday tripping on the ridge between carpet and linoleum, crawled back into bed, had fallen again today and landed on his left hip, had crawled back into bed.  He reports that he has been drinking a 1.75 of bourbon every 4 days, last drink was at 7:00 in the morning.  Patient has not been eating for the past 2 days because he has been too \"lazy to cook\".  Patient also has a straight cath for urine, has only been doing that once a day, last time that he had done that was at 2:00 yesterday afternoon.  He denies hitting his head, is reporting chest pain with coughing, shortness of breath, mild suprapubic pain.    Patient has a past medical history of malignant neoplasm of the urinary bladder, convulsions, BPH, COPD, alcohol dependence, low back pain, pulmonary nodules, osteoarthritis, retention of urine, hyperlipidemia, hypomagnesemia, sepsis, hypokalemia, hypophosphatemia    Allergies:  No Known Allergies    Problem List:    Patient Active Problem List    Diagnosis Date Noted    Generalized weakness 04/30/2025     Priority: Medium    Falls frequently 04/30/2025     Priority: Medium    Sepsis (H) 04/30/2025     Priority: Medium    Pneumonia 04/30/2025     Priority: Medium    Complicated UTI (urinary tract infection) 04/30/2025     Priority: Medium    Hypophosphatemia 03/25/2025     Priority: Medium    Acute cystitis with hematuria 03/22/2025     Priority: Medium    " Hypokalemia 03/22/2025     Priority: Medium    Hypomagnesemia 03/21/2025     Priority: Medium    Severe sepsis with acute organ dysfunction (H) - hyperlactatemia due to UTI 03/21/2025     Priority: Medium    Chronic obstructive pulmonary disease, unspecified COPD type (H) 06/14/2024     Priority: Medium    Alcohol dependence with unspecified alcohol-induced disorder (H) 06/14/2024     Priority: Medium    Abnormal liver enzymes 06/14/2024     Priority: Medium    Asymptomatic varicose veins 06/14/2024     Priority: Medium    Low back pain 06/14/2024     Priority: Medium    Seborrheic dermatitis, unspecified 06/14/2024     Priority: Medium    Multiple pulmonary nodules 06/14/2024     Priority: Medium     Jan 07, 2019 Entered By: ISABELA CHAPPELL Comment: Lung nodule tracking begun 10/10/2017.      Osteoarthrosis 06/14/2024     Priority: Medium     Oct 16, 2012 Entered By: KHADIJAH BULLOCK Comment: Neck, shoulders, right wrist and bilateral hands      Other psoriasis 06/14/2024     Priority: Medium    Other retention of urine 06/14/2024     Priority: Medium    Personal history of malignant neoplasm of bladder 06/14/2024     Priority: Medium    Mixed hyperlipidemia 06/14/2024     Priority: Medium    Convulsions, unspecified convulsion type (H) 06/13/2024     Priority: Medium    Benign localized prostatic hyperplasia with lower urinary tract symptoms (LUTS) 06/13/2024     Priority: Medium    Malignant neoplasm of urinary bladder (H) 03/09/2023     Priority: Medium    Restless leg 12/14/2021     Priority: Medium    Venous insufficiency 01/11/2014     Priority: Medium    Other viral warts 05/18/2007     Priority: Medium        Past Medical History:    Past Medical History:   Diagnosis Date    Constipation 06/14/2024    Meningitis 06/14/2024    Pneumothorax 06/13/2019    Primary spontaneous pneumothorax 03/09/2023       Past Surgical History:    Past Surgical History:   Procedure Laterality Date    COLONOSCOPY N/A 11/12/2020     "7 tubular adenomas, follow up 3 years, 11/12/2023    ESOPHAGOSCOPY, GASTROSCOPY, DUODENOSCOPY (EGD), COMBINED N/A 11/12/2020    ALONSO's follow up 3 years, 11/12/2023       Family History:    No family history on file.    Social History:  Marital Status:   [4]  Social History     Tobacco Use    Smoking status: Every Day     Current packs/day: 1.00     Average packs/day: 1 pack/day for 53.3 years (53.3 ttl pk-yrs)     Types: Cigarettes     Start date: 1972    Smokeless tobacco: Never   Vaping Use    Vaping status: Never Used   Substance Use Topics    Alcohol use: Yes    Drug use: Not Currently     Comment: Former: \"POT and anything I could get my hands on.  I would stick things in my arms.\"        Medications:    atorvastatin (LIPITOR) 20 MG tablet  finasteride (PROSCAR) 5 MG tablet  levofloxacin (LEVAQUIN) 500 MG tablet  magnesium oxide (MAG-OX) 400 MG tablet  omeprazole (PRILOSEC) 20 MG DR capsule  tamsulosin (FLOMAX) 0.4 MG capsule          Review of Systems   Constitutional:  Positive for appetite change.   Gastrointestinal:  Positive for abdominal pain.   Genitourinary:  Positive for decreased urine volume.   Neurological:  Positive for weakness.   All other systems reviewed and are negative.  See HPI    Physical Exam   BP: (!) 140/75  Pulse: 120  Temp: 100.3  F (37.9  C)  Resp: 20  Height: 182.9 cm (6')  Weight: 60.8 kg (134 lb)  SpO2: 92 %      Physical Exam  Vitals and nursing note reviewed.   Constitutional:       General: He is not in acute distress.     Appearance: He is ill-appearing. He is not toxic-appearing.   HENT:      Head: Normocephalic.      Right Ear: Tympanic membrane normal.      Left Ear: Tympanic membrane normal.      Nose: Nose normal.      Mouth/Throat:      Mouth: Mucous membranes are dry.      Pharynx: Oropharynx is clear.   Eyes:      Extraocular Movements: Extraocular movements intact.      Pupils: Pupils are equal, round, and reactive to light.   Cardiovascular:      Rate and " Rhythm: Tachycardia present.      Pulses: Normal pulses.      Heart sounds: Normal heart sounds.      Comments: Regular irregular   Pulmonary:      Effort: Pulmonary effort is normal. No tachypnea, accessory muscle usage or respiratory distress.      Breath sounds: Wheezing present.   Abdominal:      General: Abdomen is flat.      Palpations: Abdomen is soft.      Tenderness: There is no right CVA tenderness, left CVA tenderness or guarding.   Musculoskeletal:         General: No swelling or deformity.      Cervical back: Normal range of motion and neck supple. No tenderness.      Right lower leg: No edema.      Left lower leg: No edema.   Skin:     General: Skin is warm.      Capillary Refill: Capillary refill takes less than 2 seconds.   Neurological:      General: No focal deficit present.      Mental Status: He is alert and oriented to person, place, and time.   Psychiatric:         Mood and Affect: Mood normal.         Behavior: Behavior normal.         ED Course            EKG Interpretation:      Interpreted by TIMOTEO West CNP  Time reviewed: 1515  Symptoms at time of EKG: sepsis   Rhythm: sinus tachycardia,septal infarct, age undetermined  Rate: 130  Ectopy: frequent PVC  Conduction: normal  ST Segments/ T Waves: no acute ST changes  Comparison to prior: similar  Clinical Impression: as above  Pending  EKG over read by internal medicine       Sepsis ED evaluation   The patient has signs of sepsis as evidenced by:  1. Presence of 2 SIRS criteria, suspected infection, AND  2. Organ dysfunction: Sepsis work up in progress. Will continue to monitor for signs of organ dysfunction    Sepsis Care Initiation: Starting at  3:07 PM on 04/30/25, until specified. Prior to this documentation, sepsis, severe sepsis, or septic shock was NOT thought to be a significant cause of illness. This order represents the first time infection was seriously considered to be affecting the patient.    Lactic Acid Results:  Recent  Labs   Lab Test 04/30/25  1713 04/30/25  1507 03/21/25  1834   LACT 2.5* 5.9* 2.0       3 Hour Bundle 6 Hour Bundle (Reassessment)   Blood Cultures before IV Antibiotics: Yes  Antibiotics given: see below  Prehospital fluid volume (mL):                     Total fluids given (ED +Pre-hospital):  Full 30 mL/kg bolus given based on weight: 1,820 mL   Repeat Lactic Acid Level: Ordered by reflex for 2 hours after initial lactic acid collection.  Vasopressors:    . Pending  Repeat perfusion exam: I attest to having performed a repeat sepsis exam and assessment of perfusion at :pending   BMI Readings from Last 1 Encounters:   04/30/25 16.31 kg/m        Anti-infectives (From admission through now)      Start     Dose/Rate Route Frequency Ordered Stop    04/30/25 1640  doxycycline hyclate (VIBRAMYCIN) capsule 100 mg         100 mg Oral ONCE 04/30/25 1637 04/30/25 1728    04/30/25 1515  cefTRIAXone (ROCEPHIN) 1 g vial to attach to  mL bag for ADULTS or NS 50 mL bag for PEDS         1 g  over 15-30 Minutes Intravenous ONCE 04/30/25 1511 04/30/25 1641                     Results for orders placed or performed during the hospital encounter of 04/30/25 (from the past 24 hours)   CBC with platelets differential    Narrative    The following orders were created for panel order CBC with platelets differential.  Procedure                               Abnormality         Status                     ---------                               -----------         ------                     CBC with platelets and ...[[id number]]  Abnormal            Final result               RBC and Platelet Morpho...[[id number]]                      Final result               Manual Differential[[id number]]         Abnormal            Final result                 Please view results for these tests on the individual orders.   Comprehensive metabolic panel   Result Value Ref Range    Sodium 134 (L) 135 - 145 mmol/L    Potassium 3.6 3.4 - 5.3 mmol/L     Carbon Dioxide (CO2) 21 (L) 22 - 29 mmol/L    Anion Gap 18 (H) 7 - 15 mmol/L    Urea Nitrogen 13.5 8.0 - 23.0 mg/dL    Creatinine 0.71 0.67 - 1.17 mg/dL    GFR Estimate >90 >60 mL/min/1.73m2    Calcium 9.6 8.8 - 10.4 mg/dL    Chloride 95 (L) 98 - 107 mmol/L    Glucose 132 (H) 70 - 99 mg/dL    Alkaline Phosphatase 123 40 - 150 U/L    AST 83 (H) 0 - 45 U/L    ALT 41 0 - 70 U/L    Protein Total 6.7 6.4 - 8.3 g/dL    Albumin 3.7 3.5 - 5.2 g/dL    Bilirubin Total 2.0 (H) <=1.2 mg/dL   Lactic Acid Whole Blood with 1X Repeat in 2 HR when >2   Result Value Ref Range    Lactic Acid, Initial 5.9 (HH) 0.7 - 2.0 mmol/L   Blood gas venous   Result Value Ref Range    pH Venous 7.51 (H) 7.32 - 7.43    pCO2 Venous 32 (L) 40 - 50 mm Hg    pO2 Venous 47 25 - 47 mm Hg    Bicarbonate Venous 25 21 - 28 mmol/L    Base Excess/Deficit Venous 2.9 -3.0 - 3.0 mmol/L    FIO2 21     Oxyhemoglobin Venous 84 (H) 70 - 75 %    O2 Sat, Venous 86.6 (H) 70.0 - 75.0 %    Narrative    In healthy individuals, oxyhemoglobin (O2Hb) and oxygen saturation (SO2) are approximately equal. In the presence of dyshemoglobins, oxyhemoglobin can be considerably lower than oxygen saturation.   Ethanol GH   Result Value Ref Range    Alcohol ethyl 0.02 (H) <=0.01 g/dL   Magnesium   Result Value Ref Range    Magnesium 1.1 (L) 1.7 - 2.3 mg/dL   Procalcitonin   Result Value Ref Range    Procalcitonin 0.23 <0.50 ng/mL   Troponin T, High Sensitivity   Result Value Ref Range    Troponin T, High Sensitivity 27 (H) <=22 ng/L   Phosphorus   Result Value Ref Range    Phosphorus 2.0 (L) 2.5 - 4.5 mg/dL   CBC with platelets and differential   Result Value Ref Range    WBC Count 14.3 (H) 4.0 - 11.0 10e3/uL    RBC Count 3.86 (L) 4.40 - 5.90 10e6/uL    Hemoglobin 14.2 13.3 - 17.7 g/dL    Hematocrit 39.5 (L) 40.0 - 53.0 %     (H) 78 - 100 fL    MCH 36.8 (H) 26.5 - 33.0 pg    MCHC 35.9 31.5 - 36.5 g/dL    RDW 14.0 10.0 - 15.0 %    Platelet Count 112 (L) 150 - 450 10e3/uL    Extra Tube    Narrative    The following orders were created for panel order Extra Tube.  Procedure                               Abnormality         Status                     ---------                               -----------         ------                     Extra Blue Top Tube[[id number]]                             Final result               Extra Red Top Tube[[id number]]                              Final result                 Please view results for these tests on the individual orders.   Extra Blue Top Tube   Result Value Ref Range    Hold Specimen JIC    Extra Red Top Tube   Result Value Ref Range    Hold Specimen JIC    RBC and Platelet Morphology   Result Value Ref Range    RBC Morphology Confirmed RBC Indices     Platelet Assessment  Automated Count Confirmed. Platelet morphology is normal.     Automated Count Confirmed. Platelet morphology is normal.   Manual Differential   Result Value Ref Range    % Neutrophils 91 %    % Lymphocytes 5 %    % Monocytes 4 %    % Eosinophils 0 %    % Basophils 0 %    Absolute Neutrophils 13.0 (H) 1.6 - 8.3 10e3/uL    Absolute Lymphocytes 0.7 (L) 0.8 - 5.3 10e3/uL    Absolute Monocytes 0.6 0.0 - 1.3 10e3/uL    Absolute Eosinophils 0.0 0.0 - 0.7 10e3/uL    Absolute Basophils 0.0 0.0 - 0.2 10e3/uL   Phosphorus   Result Value Ref Range    Phosphorus 1.9 (L) 2.5 - 4.5 mg/dL   XR Chest Port 1 View    Narrative    PROCEDURE:  XR CHEST PORT 1 VIEW    HISTORY:  Fever.     COMPARISON:  None.    FINDINGS:   The cardiac silhouette is normal in size. The pulmonary vasculature is  normal.  There is a perihilar opacity in the left lung. This was not  seen in 2021. This opacity is suspicious for pneumonia but careful  follow-up to clearing is recommended. Emphysematous changes are noted  in both lungs. There is apical pleural thickening seen on the right  which is stable from previous examination No pleural effusion or  pneumothorax.      Impression    IMPRESSION:  Left perihilar  opacity suspicious for pneumonia      MARIANA SUMMERS MD         SYSTEM ID:  [id number]   UA with Microscopic reflex to Culture    Specimen: Urine, Catheter   Result Value Ref Range    Color Urine Yellow Colorless, Straw, Light Yellow, Yellow    Appearance Urine Slightly Cloudy (A) Clear    Glucose Urine Negative Negative mg/dL    Bilirubin Urine Negative Negative    Ketones Urine Negative Negative mg/dL    Specific Gravity Urine 1.013 1.000 - 1.030    Blood Urine Negative Negative    pH Urine 7.0 5.0 - 9.0    Protein Albumin Urine 30 (A) Negative mg/dL    Urobilinogen Urine Normal Normal mg/dL    Nitrite Urine Negative Negative    Leukocyte Esterase Urine Moderate (A) Negative    Bacteria Urine Moderate (A) None Seen /HPF    Mucus Urine Present (A) None Seen /LPF    Amorphous Crystals Urine Few (A) None Seen /HPF    RBC Urine 5 (H) <=2 /HPF    WBC Urine 43 (H) <=5 /HPF    Narrative    Urine Culture ordered based on laboratory criteria   XR Pelvis w Hip Port Left 1 View    Narrative    PROCEDURE: XR PELVIS AND HIP PORTABLE LEFT 1 VIEW 4/30/2025 3:30 PM    HISTORY: fall, left hip pain    COMPARISONS: None.    TECHNIQUE: Pelvis one view, left hip one view    FINDINGS: The pelvis is intact. The sacrum and sacroiliac joints  appear normal. Degenerative changes are present in the lower lumbar  spine. The right proximal femur is intact.    Left hip: There is no left hip fracture or destructive lesion.  Articular spaces are normal in height at the left hip.         Impression    IMPRESSION: No pelvic or left hip fracture.    MARIANA SUMMERS MD         SYSTEM ID:  [id number]   CT Head w/o Contrast    Narrative    PROCEDURE: CT HEAD W/O CONTRAST     HISTORY: fall, etoh.    COMPARISON: 3/21/2025    TECHNIQUE:  Helical images of the head from the foramen magnum to the  vertex were obtained without contrast.    FINDINGS: The ventricles and sulci are normal in volume. No acute  intracranial hemorrhage, mass effect, midline shift,  hydrocephalus or  basilar cystern effacement are present.    The grey-white matter interface is preserved.    The calvarium is intact. The mastoid air cells are clear.  The  visualized paranasal sinuses are clear.      Impression    IMPRESSION: Normal brain for age      MARIANA SUMMERS MD         SYSTEM ID:  [id number]   CT Cervical Spine w/o Contrast    Narrative    PROCEDURE: CT CERVICAL SPINE W/O CONTRAST 4/30/2025 4:10 PM    HISTORY: fall    COMPARISONS: None.    Meds/Dose Given:    TECHNIQUE: CT scan of the cervical spine with sagittal and coronal  reconstructions.    FINDINGS: There is degenerative change at the middle atlantoaxial  joint. There is decrease in height in the C5-C6 and C6-C7 discs. There  is mild forward subluxation of C4 on C5 and  C7 on T1 due to facet  joint degenerative changes. There are no fractures of the vertebral  bodies or arches. Advanced facet joint degenerative changes are noted  worse on the left right. Calcific plaquing at the carotid  bifurcations. The prevertebral soft tissues are otherwise normal.         Impression    IMPRESSION: Advanced degenerative changes of the cervical spine no  acute fractures.    MARIANA SUMMERS MD         SYSTEM ID:  [id number]   Troponin T, High Sensitivity   Result Value Ref Range    Troponin T, High Sensitivity 24 (H) <=22 ng/L   Lactic acid whole blood   Result Value Ref Range    Lactic Acid 2.5 (H) 0.7 - 2.0 mmol/L       Medications   magnesium sulfate 4 g in 100 mL sterile water intermittent infusion (4 g Intravenous $New Bag 4/30/25 1642)   potassium & sodium phosphates (NEUTRA-PHOS) Packet 1 packet (1 packet Oral or Feeding Tube $Given 4/30/25 1642)   sodium chloride 0.9 % infusion ( Intravenous $New Bag 4/30/25 1728)   lidocaine (XYLOCAINE) 2 % external gel ( Topical $Given 4/30/25 1521)   sodium chloride 0.9% BOLUS 1,824 mL (0 mLs Intravenous Stopped 4/30/25 1721)   cefTRIAXone (ROCEPHIN) 1 g vial to attach to  mL bag for ADULTS or NS 50  "mL bag for PEDS (0 g Intravenous Stopped 4/30/25 1641)   doxycycline hyclate (VIBRAMYCIN) capsule 100 mg (100 mg Oral $Given 4/30/25 1728)       Assessments & Plan (with Medical Decision Making)  Avinash Wray is a 74 year old male who presents to the emergency department via ambulance from the [facility].  Patient has been residing at the [facility] since being discharged from the hospital on 25 March.  CRT and community paramedic have been following up with patient, CRT called the ambulance today on patient's behalf.  Patient has been weak, unable to get out of bed, had fallen yesterday tripping on the ridge between carpet and linoleum, crawled back into bed, had fallen again today and landed on his left hip, had crawled back into bed.  He reports that he has been drinking a 1.75 of bourbon every 4 days, last drink was at 7:00 in the morning.  Patient has not been eating for the past 2 days because he has been too \"lazy to cook\".  Patient also has a straight cath for urine, has only been doing that once a day, last time that he had done that was at 2:00 yesterday afternoon.  He denies hitting his head, is reporting chest pain with coughing, shortness of breath,mild suprapubic pain.  Patient has a past medical history of malignant neoplasm of the urinary bladder, convulsions, BPH, COPD, alcohol dependence, low back pain, pulmonary nodules, osteoarthritis, retention of urine, hyperlipidemia, hypomagnesemia, sepsis, hypokalemia, hypophosphatemia  Patient is alert and oriented, nondistressed nontoxic appears chronically ill.  Cachexia.  Abdomen slightly tender suprapubic.  Lung sounds scattered wheeze otherwise clear congested cough.  Moving all of his extremities appropriately, able to flex and extend his legs abduct and adduct his hips without discomfort or pain.  Pelvis is stable.  No obvious deformities. He is febrile, tachycardic.  Oxygen saturations on room air 94%.  He does not appear to be in any " active alcohol withdrawal at this time.  3:16 PM lactic acid 5.9.  Suspect sepsis, dehydration, alcohol use.  Blood cultures obtained, IV fluid bolus, antibiotics ordered.  Procalcitonin normal.  Repeat lactic after IV fluid bolus  improved 2.5  Bladder scanned for >800ml; Urinalysis implies UTI,  (straight cath) Moderate leukocyte Estrace, moderate bacteria, RBC 5, WBC 43; previous urine culture 3/21/25:strep agalactiae, susceptible to cephalosporins, ceftriaxone was ordered   CBC: WBC 14.3, hemoglobin 14.2, platelets 112    CMP: Sodium 134, carbon dioxide 21, anion gap 18, renal function normal, chloride 95, glucose 132, AST 83, bilirubin 2.    Magnesium 1.1-replaced, phosphorus 2.0-replaced.  Alcohol 0.02.  Troponin, 27- similar to previous, repeat pending.   Chest xray:Left perihilar opacity suspicious for pneumonia   Portable pelvis:stable  CT head: stable   CT cervical spine:stable     Meds:IVF bolus, magnesium, phosphorus replacement, Rocephin, doxycycline  I consulted with , hospitalist, who kindly accepted patient for admission, sepsis UTI pneumonia, weakness.  Has been hemodynamically stable here.  Admit to the medical department.  Patient agreeable to plan.      I have reviewed the nursing notes.    I have reviewed the findings, diagnosis, plan and need for follow up with the patient.    Medical Decision Making  The patient's presentation was of high complexity (an acute health issue posing potential threat to life or bodily function).    The patient's evaluation involved:  review of external note(s) from 1 sources (see separate area of note for details)  review of 3+ test result(s) ordered prior to this encounter (see separate area of note for details)  ordering and/or review of 3+ test(s) in this encounter (see separate area of note for details)  discussion of management or test interpretation with another health professional (see separate area of note for details)    The patient's management  necessitated high risk (a decision regarding hospitalization).        New Prescriptions    No medications on file       Final diagnoses:   Generalized weakness   Sepsis (H)   Hypomagnesemia   Hypophosphatemia   Falls frequently   Complicated UTI (urinary tract infection) - straight caths   Pneumonia       4/30/2025   [facility], APRN CNP  04/30/25 2910

## 2025-04-30 NOTE — PROGRESS NOTES
Preventing Falls in the Hospital (02:42)  Your health professional recommends that you watch this short online health video.  Find out why you're at risk for falling in the hospital and how to prevent falls.   Purpose: Understand what increases a person's risk of falling in the hospital and how to prevent falls.  Goal: Understand what increases a person's risk of falling in the hospital and how to prevent falls.    Watch: Scan the QR code or visit the link to view video       https://hwi.se/[email or web address]  Current as of: July 17, 2023  Content Version: 14.2 2024 [facility] [employer].   Care instructions adapted under license by your healthcare professional. If you have questions about a medical condition or this instruction, always ask your healthcare professional. [employer] disclaims any warranty or liability for your use of this information.  Preventing Falls in the Hospital

## 2025-04-30 NOTE — H&P
[facility]    History and Physical - Hospitalist Service       Date of Admission:  4/30/2025    Assessment & Plan      Avinash Wray is a 74 year old male with history of bladder cancer and urinary retention requiring self cath, COPD, alcohol dependence presenting for repeated falls.  He is currently residing at the [facility].  He is being followed by the veterans crisis response team and adult protection from [facility].  Drinking 1.75 mL of bourbon every 4 days.  Drink this morning at 7 AM.  Has no appetite for the past couple days.  Admits to coughing more recently.  Is supposed to be performing self cath twice daily, but has only been performing it daily.  Last was 24 hours ago.  He had over 800 mL in bladder.  He was recently hospitalized just over a month ago for sepsis from a urinary source.    Principal Problem:    Sepsis (H)    Generalized weakness    Falls frequently    Pneumonia    Complicated UTI (urinary tract infection)  Workup in the ED revealed WBC 14.3, lactic acid 5.9.  Temp 100.3  There is a left lower lobe opacity.  UA is abnormal.  May have sepsis from UTI and/or pneumonia  Blood cultures obtained  Ordered sputum cultures  Received prompt ceftriaxone.  Given potential for pneumonia, also cover with doxycycline  IVF bolus provided.  Lactic acid improved to 2.5.  Continue maintenance IV and recheck lactic acid in the morning.  He may have some degree of lactic acidosis due to alcohol use  Albuterol nebs as needed    Active Problems:    Hypomagnesemia  Chronic on home replacement  1.1 in ED  Likely due to alcohol and poor nutrition  Replace IV now, per protocol thereafter      Hypophosphatemia  Likely chronic, seen last hospitalization  1.9 in ED, due to alcohol and poor nutrition  Replace    Chronic obstructive pulmonary disease, unspecified COPD type (H)  Not on inhalers  Continues to smoke but declined a nicotine patch  as he got a rash the last time       Alcohol  "dependence with unspecified alcohol-induced disorder (H)  Regular alcohol use  CIWA protocol       BPH with urinary retention  Self cath BID.  Continue to follow with urology annually as he has been.            Diet:  regular  DVT Prophylaxis: Enoxaparin (Lovenox) SQ  Pearce Catheter: Not present  Lines: None     Cardiac Monitoring: None  Code Status:  DNR/DNI    Clinically Significant Risk Factors Present on Admission         # Hyponatremia: Lowest Na = 134 mmol/L in last 2 days, will monitor as appropriate  # Hypochloremia: Lowest Cl = 95 mmol/L in last 2 days, will monitor as appropriate    # Hypomagnesemia: Lowest Mg = 1.1 mg/dL in last 2 days, will replace as needed     # Thrombocytopenia: Lowest platelets = 112 in last 2 days, will monitor for bleeding             # Cachexia: Estimated body mass index is 16.31 kg/m  as calculated from the following:    Height as of this encounter: 1.93 m (6' 4\").    Weight as of this encounter: 60.8 kg (134 lb).              Disposition Plan     Medically Ready for Discharge: Anticipated in 2-4 Days           Felix Lambert MD  Hospitalist Service  [facility]  Securely message with [employer] (more info)  Text page via [facility] Paging/Directory     ______________________________________________________________________    Chief Complaint   Weakness, falls    History is obtained from the patient and ED provider    History of Present Illness   Avinash Wray is a 74 year old male with history of bladder cancer and urinary retention requiring self cath, COPD, alcohol dependence presenting for repeated falls.  He is currently residing at the [facility].  He is being followed by the [facility] crisis response team and adult protection from [facility].  Drinking 1.75 mL of bourbon every 4 days.  Drink this morning at 7 AM.  Has no appetite for the past couple days.  Admits to coughing more recently.  He was recently hospitalized just over a month ago for sepsis " from a urinary source.        Past Medical History    Past Medical History:   Diagnosis Date    Constipation 06/14/2024    Meningitis 06/14/2024    Oct 16, 2012 Entered By: KHADIJAH BULLOCK Comment: While in [facility].      Pneumothorax 06/13/2019    Primary spontaneous pneumothorax 03/09/2023    Oct 16, 2012 Entered By: KHADIJAH BULLOCK Comment: Treated with chest tube         Past Surgical History   Past Surgical History:   Procedure Laterality Date    COLONOSCOPY N/A 11/12/2020    7 tubular adenomas, follow up 3 years, 11/12/2023    ESOPHAGOSCOPY, GASTROSCOPY, DUODENOSCOPY (EGD), COMBINED N/A 11/12/2020    ALONSO's follow up 3 years, 11/12/2023       Prior to Admission Medications   Prior to Admission Medications   Prescriptions Last Dose Informant Patient Reported? Taking?   atorvastatin (LIPITOR) 20 MG tablet   Yes No   Sig: Take 20 mg by mouth daily.   finasteride (PROSCAR) 5 MG tablet   Yes No   Sig: Take 5 mg by mouth daily.   levofloxacin (LEVAQUIN) 500 MG tablet   No No   Sig: Take 1 tablet (500 mg) by mouth daily.   magnesium oxide (MAG-OX) 400 MG tablet   No No   Sig: Take 1 tablet (400 mg) by mouth 2 times daily.   omeprazole (PRILOSEC) 20 MG DR capsule   No No   Sig: Take 1 capsule (20 mg) by mouth daily   tamsulosin (FLOMAX) 0.4 MG capsule   Yes No   Sig: Take 0.8 mg by mouth daily.      Facility-Administered Medications: None        Review of Systems     No chills or sweats  No chest pain.  Has a productive cough  Lack of appetite.  No diarrhea  Significant weakness    Physical Exam   Vital Signs: Temp: 100.3  F (37.9  C) Temp src: Tympanic BP: 108/65 Pulse: 98   Resp: 17 SpO2: (!) 91 % O2 Device: None (Room air)    Weight: 134 lbs 0 oz    General Appearance: Alert, pleasant, thin with mild cachexia  Eyes: EOMI, PERRL, no conjunctival injection  OroPharynx Exam: Normal pharynx.  Neck Exam: Supple, no masses or nodes.  Chest/Respiratory Exam: Normal chest wall and respirations. Clear to  auscultation.  Cardiovascular Exam: Regular rate and rhythm. S1, S2, no murmur, click, gallop, or rubs.  Gastrointestinal Exam: Soft, nontender, no abnormal masses or organomegaly.  Extremities: No lower extremity edema.  Neuro Exam: No focal deficits  Psychiatric: Normal affect and mentation      Medical Decision Making             Data     I have personally reviewed the following data over the past 24 hrs:    14.3 (H)  \   14.2   / 112 (L)     134 (L) 95 (L) 13.5 /  132 (H)   3.6 21 (L) 0.71 \     ALT: 41 AST: 83 (H) AP: 123 TBILI: 2.0 (H)   ALB: 3.7 TOT PROTEIN: 6.7 LIPASE: N/A     Trop: 24 (H) BNP: N/A     Procal: 0.23 CRP: N/A Lactic Acid: 2.5 (H)         Imaging results reviewed over the past 24 hrs:   Recent Results (from the past 24 hours)   XR Chest Port 1 View    Narrative    PROCEDURE:  XR CHEST PORT 1 VIEW    HISTORY:  Fever.     COMPARISON:  None.    FINDINGS:   The cardiac silhouette is normal in size. The pulmonary vasculature is  normal.  There is a perihilar opacity in the left lung. This was not  seen in 2021. This opacity is suspicious for pneumonia but careful  follow-up to clearing is recommended. Emphysematous changes are noted  in both lungs. There is apical pleural thickening seen on the right  which is stable from previous examination No pleural effusion or  pneumothorax.      Impression    IMPRESSION:  Left perihilar opacity suspicious for pneumonia      MARIANA SUMMERS MD         SYSTEM ID:  [id number]   XR Pelvis w Hip Port Left 1 View    Narrative    PROCEDURE: XR PELVIS AND HIP PORTABLE LEFT 1 VIEW 4/30/2025 3:30 PM    HISTORY: fall, left hip pain    COMPARISONS: None.    TECHNIQUE: Pelvis one view, left hip one view    FINDINGS: The pelvis is intact. The sacrum and sacroiliac joints  appear normal. Degenerative changes are present in the lower lumbar  spine. The right proximal femur is intact.    Left hip: There is no left hip fracture or destructive lesion.  Articular spaces are normal  in height at the left hip.         Impression    IMPRESSION: No pelvic or left hip fracture.    MARIANA SUMMERS MD         SYSTEM ID:  [id number]   CT Head w/o Contrast    Narrative    PROCEDURE: CT HEAD W/O CONTRAST     HISTORY: fall, etoh.    COMPARISON: 3/21/2025    TECHNIQUE:  Helical images of the head from the foramen magnum to the  vertex were obtained without contrast.    FINDINGS: The ventricles and sulci are normal in volume. No acute  intracranial hemorrhage, mass effect, midline shift, hydrocephalus or  basilar cystern effacement are present.    The grey-white matter interface is preserved.    The calvarium is intact. The mastoid air cells are clear.  The  visualized paranasal sinuses are clear.      Impression    IMPRESSION: Normal brain for age      MARIANA SUMMERS MD         SYSTEM ID:  [id number]   CT Cervical Spine w/o Contrast    Narrative    PROCEDURE: CT CERVICAL SPINE W/O CONTRAST 4/30/2025 4:10 PM    HISTORY: fall    COMPARISONS: None.    Meds/Dose Given:    TECHNIQUE: CT scan of the cervical spine with sagittal and coronal  reconstructions.    FINDINGS: There is degenerative change at the middle atlantoaxial  joint. There is decrease in height in the C5-C6 and C6-C7 discs. There  is mild forward subluxation of C4 on C5 and  C7 on T1 due to facet  joint degenerative changes. There are no fractures of the vertebral  bodies or arches. Advanced facet joint degenerative changes are noted  worse on the left right. Calcific plaquing at the carotid  bifurcations. The prevertebral soft tissues are otherwise normal.         Impression    IMPRESSION: Advanced degenerative changes of the cervical spine no  acute fractures.    MARIANA SUMMERS MD         SYSTEM ID:  [id number]

## 2025-04-30 NOTE — PROGRESS NOTES
:    Patient presented to the ED with [facility] (True).  Patient has been staying at [facility].    Patient is a  and is only 10% service connected.  Patient was admitted about a month and a half ago and was discharged to the [facility].    Met with True from [facility] and he stated he has fallen twice and is very weak.  He reports patient drinks daily.Emely Adult protection worker at [facility] ([phone]) is also following patient. [facility] paramedic has also provided some services for patient.     True DISLA would like to be updated and can be contacted at First call ([phone])    RADHA Lopez on 4/30/2025 at 3:03 PM

## 2025-04-30 NOTE — PROGRESS NOTES
04/30/25 1817   Initial Information   Patient Belongings remains with patient   Patient Belongings Remaining with Patient cell phone/electronics;clothing;vision aids;cash/credit card;purse/wallet   Did you bring any home meds/supplements to the hospital?  No     [facility] will make every effort per our policy to help keep your items safe while in the hospital.  If you choose to keep any items at the bedside, we cannot be held responsible for any items that are lost or broken.      List items sent to safe: NA    I have reviewed my belongings list on admission and verify that it is correct.     Patient signature_______________________________  Date/Time_____________________    2nd Staff person if patient unable to sign __________________________  Date/Time ______________________      I have received all my belongings noted above at discharge.    Patient signature________________________________  Date/Time  __________________________

## 2025-04-30 NOTE — PROGRESS NOTES
Admission Note    Data:  Avinash Wray admitted to 301 from emergency room at 1815.      Action:  Dr. Lambert has been notified of admission. Pt oriented to unit, call light in reach.     Response:  Patient tolerated transfer. Temp: 99.1  F (37.3  C) Temp src: Tympanic BP: 108/68 Pulse: 95   Resp: 18 SpO2: 92 % O2 Device: None (Room air)

## 2025-04-30 NOTE — PHARMACY-ADMISSION MEDICATION HISTORY
Pharmacist Admission Medication History    Admission medication history is complete. The information provided in this note is only as accurate as the sources available at the time of the update.    Information Source(s): Patient and CareEverywhere/SureScripts via in-person and [facility] records via Care Everywhere    Pertinent Information: Patient states he takes his magnesium and omeprazole daily but hasn't taken his atorvastatin, finasteride or tamsulosin for over a month.  Patient states he has 'tons' of those medications at home and [facility].  Patient also states when he does take his tamsulosin, he only takes one capsule, not two as prescribed.  Left med on home med list as prescribed but may need to change at discharge.     Changes made to PTA medication list:  Added: None  Deleted: levofloxacin  Changed: None    Allergies reviewed with patient and updates made in EHR: yes    Medication History Completed By: Lexi Fishman [facility] 4/30/2025 6:55 PM    PTA Med List   Medication Sig Last Dose/Taking    atorvastatin (LIPITOR) 20 MG tablet Take 20 mg by mouth daily. More than a month    finasteride (PROSCAR) 5 MG tablet Take 5 mg by mouth daily. More than a month    magnesium oxide (MAG-OX) 400 MG tablet Take 1 tablet (400 mg) by mouth 2 times daily. 4/29/2025    omeprazole (PRILOSEC) 20 MG DR capsule Take 1 capsule (20 mg) by mouth daily 4/29/2025    tamsulosin (FLOMAX) 0.4 MG capsule Take 0.8 mg by mouth daily. More than a month

## 2025-04-30 NOTE — ED TRIAGE NOTES
Pt here with EMS into [facility], pt comes from the [facility] and is having issues of generalized weakness and falling with failure to thrive. Pt drinks chronically, [facility] and is [facility] by [facility], VSS, pt denies any pain or injuries from his falls, pt straight caths himself chronically, but has not done this today     Triage Assessment (Adult)       Row Name 04/30/25 0151          Triage Assessment    Airway WDL WDL        Respiratory WDL    Respiratory WDL WDL        Peripheral/Neurovascular WDL    Peripheral Neurovascular WDL WDL

## 2025-05-01 ENCOUNTER — APPOINTMENT (OUTPATIENT)
Dept: OCCUPATIONAL THERAPY | Facility: OTHER | Age: 75
End: 2025-05-01
Attending: FAMILY MEDICINE
Payer: COMMERCIAL

## 2025-05-01 ENCOUNTER — APPOINTMENT (OUTPATIENT)
Dept: PHYSICAL THERAPY | Facility: OTHER | Age: 75
End: 2025-05-01
Attending: FAMILY MEDICINE
Payer: COMMERCIAL

## 2025-05-01 VITALS
HEIGHT: 76 IN | DIASTOLIC BLOOD PRESSURE: 58 MMHG | TEMPERATURE: 100.3 F | BODY MASS INDEX: 17.28 KG/M2 | WEIGHT: 141.9 LBS | RESPIRATION RATE: 16 BRPM | HEART RATE: 74 BPM | OXYGEN SATURATION: 92 % | SYSTOLIC BLOOD PRESSURE: 105 MMHG

## 2025-05-01 LAB
ANION GAP SERPL CALCULATED.3IONS-SCNC: 12 MMOL/L (ref 7–15)
ATRIAL RATE - MUSE: 100 BPM
ATRIAL RATE - MUSE: 130 BPM
BACTERIA BLD CULT: NORMAL
BACTERIA BLD CULT: NORMAL
BUN SERPL-MCNC: 10.9 MG/DL (ref 8–23)
CALCIUM SERPL-MCNC: 8.3 MG/DL (ref 8.8–10.4)
CHLORIDE SERPL-SCNC: 100 MMOL/L (ref 98–107)
CREAT SERPL-MCNC: 0.59 MG/DL (ref 0.67–1.17)
DIASTOLIC BLOOD PRESSURE - MUSE: NORMAL MMHG
DIASTOLIC BLOOD PRESSURE - MUSE: NORMAL MMHG
EGFRCR SERPLBLD CKD-EPI 2021: >90 ML/MIN/1.73M2
ERYTHROCYTE [DISTWIDTH] IN BLOOD BY AUTOMATED COUNT: 13.9 % (ref 10–15)
GLUCOSE SERPL-MCNC: 101 MG/DL (ref 70–99)
GRAM STAIN RESULT: ABNORMAL
HCO3 SERPL-SCNC: 24 MMOL/L (ref 22–29)
HCT VFR BLD AUTO: 33.9 % (ref 40–53)
HGB BLD-MCNC: 12.1 G/DL (ref 13.3–17.7)
INTERPRETATION ECG - MUSE: NORMAL
INTERPRETATION ECG - MUSE: NORMAL
MAGNESIUM SERPL-MCNC: 1.4 MG/DL (ref 1.7–2.3)
MAGNESIUM SERPL-MCNC: 2.2 MG/DL (ref 1.7–2.3)
MCH RBC QN AUTO: 36.8 PG (ref 26.5–33)
MCHC RBC AUTO-ENTMCNC: 35.7 G/DL (ref 31.5–36.5)
MCV RBC AUTO: 103 FL (ref 78–100)
P AXIS - MUSE: 85 DEGREES
P AXIS - MUSE: 88 DEGREES
PHOSPHATE SERPL-MCNC: 2.6 MG/DL (ref 2.5–4.5)
PLATELET # BLD AUTO: 89 10E3/UL (ref 150–450)
POTASSIUM SERPL-SCNC: 3.1 MMOL/L (ref 3.4–5.3)
POTASSIUM SERPL-SCNC: 3.4 MMOL/L (ref 3.4–5.3)
PR INTERVAL - MUSE: 142 MS
PR INTERVAL - MUSE: 158 MS
QRS DURATION - MUSE: 74 MS
QRS DURATION - MUSE: 76 MS
QT - MUSE: 304 MS
QT - MUSE: 402 MS
QTC - MUSE: 447 MS
QTC - MUSE: 518 MS
R AXIS - MUSE: 83 DEGREES
R AXIS - MUSE: 87 DEGREES
RBC # BLD AUTO: 3.29 10E6/UL (ref 4.4–5.9)
SODIUM SERPL-SCNC: 136 MMOL/L (ref 135–145)
SYSTOLIC BLOOD PRESSURE - MUSE: NORMAL MMHG
SYSTOLIC BLOOD PRESSURE - MUSE: NORMAL MMHG
T AXIS - MUSE: 85 DEGREES
T AXIS - MUSE: 85 DEGREES
VENTRICULAR RATE- MUSE: 100 BPM
VENTRICULAR RATE- MUSE: 130 BPM
WBC # BLD AUTO: 11.9 10E3/UL (ref 4–11)

## 2025-05-01 PROCEDURE — 97116 GAIT TRAINING THERAPY: CPT | Mod: GP

## 2025-05-01 PROCEDURE — 250N000011 HC RX IP 250 OP 636: Performed by: FAMILY MEDICINE

## 2025-05-01 PROCEDURE — 83735 ASSAY OF MAGNESIUM: CPT | Performed by: FAMILY MEDICINE

## 2025-05-01 PROCEDURE — 250N000013 HC RX MED GY IP 250 OP 250 PS 637: Performed by: FAMILY MEDICINE

## 2025-05-01 PROCEDURE — 36415 COLL VENOUS BLD VENIPUNCTURE: CPT | Performed by: FAMILY MEDICINE

## 2025-05-01 PROCEDURE — 80048 BASIC METABOLIC PNL TOTAL CA: CPT | Performed by: FAMILY MEDICINE

## 2025-05-01 PROCEDURE — 120N000001 HC R&B MED SURG/OB

## 2025-05-01 PROCEDURE — 97165 OT EVAL LOW COMPLEX 30 MIN: CPT | Mod: GO | Performed by: OCCUPATIONAL THERAPIST

## 2025-05-01 PROCEDURE — 84100 ASSAY OF PHOSPHORUS: CPT | Performed by: FAMILY MEDICINE

## 2025-05-01 PROCEDURE — 85014 HEMATOCRIT: CPT | Performed by: FAMILY MEDICINE

## 2025-05-01 PROCEDURE — 99232 SBSQ HOSP IP/OBS MODERATE 35: CPT | Performed by: FAMILY MEDICINE

## 2025-05-01 PROCEDURE — 97530 THERAPEUTIC ACTIVITIES: CPT | Mod: GO | Performed by: OCCUPATIONAL THERAPIST

## 2025-05-01 PROCEDURE — 97161 PT EVAL LOW COMPLEX 20 MIN: CPT | Mod: GP

## 2025-05-01 PROCEDURE — 87205 SMEAR GRAM STAIN: CPT | Performed by: FAMILY MEDICINE

## 2025-05-01 PROCEDURE — 84132 ASSAY OF SERUM POTASSIUM: CPT | Performed by: FAMILY MEDICINE

## 2025-05-01 RX ORDER — POTASSIUM CHLORIDE 1500 MG/1
40 TABLET, EXTENDED RELEASE ORAL ONCE
Status: DISCONTINUED | OUTPATIENT
Start: 2025-05-01 | End: 2025-05-01

## 2025-05-01 RX ORDER — POTASSIUM CHLORIDE 1500 MG/1
40 TABLET, EXTENDED RELEASE ORAL ONCE
Status: COMPLETED | OUTPATIENT
Start: 2025-05-01 | End: 2025-05-01

## 2025-05-01 RX ORDER — MAGNESIUM SULFATE HEPTAHYDRATE 40 MG/ML
4 INJECTION, SOLUTION INTRAVENOUS ONCE
Status: COMPLETED | OUTPATIENT
Start: 2025-05-01 | End: 2025-05-01

## 2025-05-01 RX ORDER — MAGNESIUM SULFATE HEPTAHYDRATE 40 MG/ML
4 INJECTION, SOLUTION INTRAVENOUS ONCE
Status: DISCONTINUED | OUTPATIENT
Start: 2025-05-01 | End: 2025-05-01

## 2025-05-01 RX ADMIN — DOXYCYCLINE 100 MG: 100 INJECTION, POWDER, LYOPHILIZED, FOR SOLUTION INTRAVENOUS at 05:15

## 2025-05-01 RX ADMIN — CEFTRIAXONE SODIUM 1 G: 1 INJECTION, POWDER, FOR SOLUTION INTRAMUSCULAR; INTRAVENOUS at 09:39

## 2025-05-01 RX ADMIN — SODIUM CHLORIDE: 9 INJECTION, SOLUTION INTRAVENOUS at 05:15

## 2025-05-01 RX ADMIN — Medication 1 TABLET: at 09:38

## 2025-05-01 RX ADMIN — DOXYCYCLINE 100 MG: 100 INJECTION, POWDER, LYOPHILIZED, FOR SOLUTION INTRAVENOUS at 21:07

## 2025-05-01 RX ADMIN — ENOXAPARIN SODIUM 30 MG: 30 INJECTION SUBCUTANEOUS at 21:07

## 2025-05-01 RX ADMIN — MAGNESIUM SULFATE HEPTAHYDRATE 4 G: 4 INJECTION, SOLUTION INTRAVENOUS at 07:24

## 2025-05-01 RX ADMIN — THIAMINE HCL TAB 100 MG 100 MG: 100 TAB at 09:38

## 2025-05-01 RX ADMIN — FINASTERIDE 5 MG: 5 TABLET, FILM COATED ORAL at 09:38

## 2025-05-01 RX ADMIN — POTASSIUM CHLORIDE 40 MEQ: 1500 TABLET, EXTENDED RELEASE ORAL at 07:24

## 2025-05-01 RX ADMIN — FOLIC ACID 1 MG: 1 TABLET ORAL at 09:38

## 2025-05-01 RX ADMIN — SODIUM CHLORIDE: 9 INJECTION, SOLUTION INTRAVENOUS at 19:15

## 2025-05-01 RX ADMIN — TAMSULOSIN HYDROCHLORIDE 0.4 MG: 0.4 CAPSULE ORAL at 20:08

## 2025-05-01 ASSESSMENT — ACTIVITIES OF DAILY LIVING (ADL)
ADLS_ACUITY_SCORE: 56
ADLS_ACUITY_SCORE: 60
ADLS_ACUITY_SCORE: 60
ADLS_ACUITY_SCORE: 53
ADLS_ACUITY_SCORE: 56
ADLS_ACUITY_SCORE: 53
ADLS_ACUITY_SCORE: 52
ADLS_ACUITY_SCORE: 60
ADLS_ACUITY_SCORE: 53
ADLS_ACUITY_SCORE: 60
ADLS_ACUITY_SCORE: 56
ADLS_ACUITY_SCORE: 60
ADLS_ACUITY_SCORE: 51
ADLS_ACUITY_SCORE: 53
ADLS_ACUITY_SCORE: 53
ADLS_ACUITY_SCORE: 56
ADLS_ACUITY_SCORE: 53
ADLS_ACUITY_SCORE: 52
ADLS_ACUITY_SCORE: 56
ADLS_ACUITY_SCORE: 60
ADLS_ACUITY_SCORE: 53
ADLS_ACUITY_SCORE: 56
ADLS_ACUITY_SCORE: 58

## 2025-05-01 NOTE — PROGRESS NOTES
05/01/25 1417   Appointment Info   Signing Clinician's Name / Credentials (OT) Aury Foster, OTR/L   Living Environment   People in Home alone   Current Living Arrangements   (pt had been living at the [facility])   Home Accessibility no concerns   Living Environment Comments Pt has had several falls lately, is not safe to be alone at [facility]   Self-Care   Usual Activity Tolerance fair   Current Activity Tolerance poor   Equipment Currently Used at Home walker, rolling   Fall history within last six months yes   General Information   Onset of Illness/Injury or Date of Surgery 04/30/25   Patient/Family Therapy Goal Statement (OT) Pt stated he needs to go to a place to get stronger   Cognitive Status Examination   Orientation Status orientation to person, place and time   Affect/Mental Status (Cognitive) WFL   Follows Commands WFL   Pain Assessment   Patient Currently in Pain Yes, see Vital Sign flowsheet  (pt complained of pain in left hip area where he fell, bruising noted)   Range of Motion Comprehensive   General Range of Motion bilateral upper extremity ROM WFL   Coordination   Upper Extremity Coordination No deficits were identified   Coordination Comments pt takes care of his own self cathing at baseline   Bed Mobility   Bed Mobility supine-sit   Supine-Sit [place] (Bed Mobility) minimum assist (75% patient effort);1 person to manage equipment   Transfers   Transfers sit-stand transfer   Sit-Stand Transfer   Sit-Stand [place] (Transfers) contact guard;1 person to manage equipment   Assistive Device (Sit-Stand Transfers) walker, front-wheeled   Clinical Impression   Criteria for Skilled Therapeutic Interventions Met (OT) Yes, treatment indicated   OT Diagnosis Sepsis   Influenced by the following impairments pain and decreased activity tolerance/endurance for function   OT Problem List-Impairments impacting ADL activity tolerance impaired;strength   Assessment of Occupational Performance 1-3  Performance Deficits   Identified Performance Deficits mobility and self care   Planned Therapy Interventions (OT) ADL retraining;strengthening;progressive activity/exercise   Clinical Decision Making Complexity (OT) problem focused assessment/low complexity   Risk & Benefits of therapy have been explained risks/benefits reviewed   OT Total Evaluation Time   OT Eval, Low Complexity Minutes (15511) 15   OT Goals   Therapy Frequency (OT) Daily   OT Predicted Duration/Target Date for Goal Attainment 05/03/25   OT Goals Hygiene/Grooming;Upper Body Dressing;Lower Body Dressing;Upper Body Bathing;Transfers;Toilet Transfer/Toileting   OT: Hygiene/Grooming supervision/stand-by assist   OT: Upper Body Dressing Supervision/stand-by assist   OT: Lower Body Dressing Supervision/stand-by assist   OT: Upper Body Bathing Supervision/stand-by assist   OT: Transfer Supervision/stand-by assist   OT: Toilet Transfer/Toileting Supervision/stand-by assist   Interventions   Interventions Quick Adds Therapeutic Activity   Therapeutic Activities   Therapeutic Activity Minutes (50502) 15   Symptoms noted during/after treatment fatigue;increased pain   Treatment Detail/Skilled Intervention Pt ambulated agreeable to ambulate this afternoon, pt had completed self cares prior to session this morning, RN stated that Pt was able to straight cath himself per RN with RN assisting to hold objects, etc. Pt needed min assist for supine to sit and ambulated out to hallway using FWW and Miniml assist. Pt reported pain in left hip from previous fall, was favoring left hip with mobility.   OT Discharge Planning   OT Plan progress functional activity tolerance   OT Discharge Recommendation (DC Rec) Transitional Care Facility   OT Rationale for DC Rec Pt is not safe to care for himself at this time and recommend STR to maximize level of independence and strength needed to return to independent living   OT Brief overview of current status see above for  treatment details   Total Session Time   Timed Code Treatment Minutes 15   Total Session Time (sum of timed and untimed services) 30

## 2025-05-01 NOTE — PROGRESS NOTES
:    Received message from Emely Pinedo with Adult Protection informing me that she is following patient and would like to be updated about discharge plans.    JODY Sen on 5/1/2025 at 8:33 AM    Met with patient to discuss discharge planning. Patient reports he is agreeable to STR.     Pt/family was given the Medicare Compare list for SNF, with associated star ratings to assist with choice for referrals/discharge planning Yes    Education was given to pt/family that star ratings are updated/maintained by Medicare and can be reviewed by visiting www.medicare.gov Yes    Referrals sent to [facility] as requested by patient.    JODY Sen on 5/1/2025 at 12:02 PM    Spoke with Emely Pinedo and True Clancy to update them that [facility] are screening. True reported that he is planning on picking up patients mail from the [employer] and will bring it to patient.    JODY Sen on 5/1/2025 at 1:27 PM

## 2025-05-01 NOTE — PROGRESS NOTES
Notified provider of straight catheter difficulties with patient of bladder not completely emptying on straight catheterization. Provider ordered matta to be left in. Pt refused. Will continue to straight cath.    Audrey Joya RN .......  4/30/2025  11:10 PM

## 2025-05-01 NOTE — PROGRESS NOTES
05/01/25 1408   Appointment Info   Signing Clinician's Name / Credentials (PT) Darian Espinosa MPT   Living Environment   People in Home alone   Current Living Arrangements hotel/motel   Home Accessibility no concerns   Transportation Anticipated family or friend will provide   Self-Care   Usual Activity Tolerance moderate   Current Activity Tolerance fair   Equipment Currently Used at Home walker, rolling   Fall history within last six months yes   Number of times patient has fallen within last six months   (multiple)   General Information   Referring Physician Barryholte   Patient/Family Therapy Goals Statement (PT) short term rehab   Existing Precautions/Restrictions fall   Weight-Bearing Status - LLE full weight-bearing   Weight-Bearing Status - RLE full weight-bearing   Cognition   Affect/Mental Status (Cognition) flat/blunted affect   Orientation Status (Cognition) oriented x 3   Follows Commands (Cognition) WFL   Pain Assessment   Patient Currently in Pain Yes, see Vital Sign flowsheet  (left posterior/lateral hip region)   Integumentary/Edema   Integumentary/Edema Comments bruising on  posterior/lateral aspect of his left hip region   Posture    Posture Forward head position   Range of Motion (ROM)   Range of Motion ROM is WFL   Strength (Manual Muscle Testing)   Strength (Manual Muscle Testing) strength is WFL   Strength Comments however, patient fatigues with activity   Bed Mobility   Impairments Contributing to Impaired Bed Mobility pain;decreased strength   Comment, (Bed Mobility) supine<>sit with minimal assist to SBA   Transfers   Impairments Contributing to Impaired Transfers pain;decreased strength   Comment, (Transfers) sit to stand with minimal assist of 1; stand pivot with minimal assist to CGA of 1 using a Fww   Gait/Stairs (Locomotion)   [place] Level (Gait) minimum assist (75% patient effort);contact guard   Assistive Device (Gait) walker, front-wheeled   Distance in Feet (Gait) 60   Pattern  (Gait) step-through   Deviations/Abnormal Patterns (Gait) base of support, wide;gait speed decreased;stride length decreased   Balance   Balance Comments fair with Fww   Sensory Examination   Sensory Perception WFL   Coordination   Coordination no deficits were identified   Muscle Tone   Muscle Tone no deficits were identified   Clinical Impression   Criteria for Skilled Therapeutic Intervention Yes, treatment indicated   PT Diagnosis (PT) impaired mobility   Influenced by the following impairments pain and fatigue   Functional limitations due to impairments activity/gait tolerance and stability   Clinical Presentation (PT Evaluation Complexity) evolving   Clinical Decision Making (Complexity) low complexity   Planned Therapy Interventions (PT) bed mobility training;gait training;transfer training   Risk & Benefits of therapy have been explained evaluation/treatment results reviewed;risks/benefits reviewed;patient   PT Total Evaluation Time   PT Eval, Low Complexity Minutes (75153) 15   Physical Therapy Goals   PT Frequency Daily   PT Predicted Duration/Target Date for Goal Attainment 05/06/25   PT Goals Bed Mobility;Transfers;Gait   PT: Bed Mobility Supervision/stand-by assist   PT: Transfers Supervision/stand-by assist;Assistive device   PT: Gait Supervision/stand-by assist;Rolling walker;150 feet   PT Discharge Planning   PT Plan Continue PT   PT Discharge Recommendation (DC Rec) Transitional Care Facility   PT Rationale for DC Rec to promote strength, stability and safe mobility   PT Brief overview of current status Patient requiring assistance for mobility due to increased fatigue and pain in left hip region with gait activities; he will benefit from continued PT in short term rehab prior to return to home environment   PT Equipment Needed at Discharge walker, rolling   Physical Therapy Time and Intention   Total Session Time (sum of timed and untimed services) 15

## 2025-05-01 NOTE — PROGRESS NOTES
SAFETY CHECKLIST  ID Bands and Risk clasps correct and in place (DNR, Fall risk, Allergy, Latex, Limb):  Yes  All Lines Reconciled and labeled correctly: Yes  Whiteboard updated:Yes  Environmental interventions: Yes  Verify Tele #: 1     Audrey Joya RN .......  4/30/2025  7:32 PM

## 2025-05-01 NOTE — PLAN OF CARE
"Goal Outcome Evaluation:    Pt A/O. VSS. Afebrile. LS clear. Infrequent cough. Couple episodes of stool incontinence, diarrhea. Bladder scanned for 502 ml. Pt straight cathed himself with a 18 fr coude with 450 output. Denies pain. Up Assist of 1 with belt and walker.    /56 (BP Location: Left arm, Patient Position: Semi-Willoughby's, Cuff Size: Adult Regular)   Pulse 72   Temp 100.1  F (37.8  C) (Tympanic)   Resp 16   Ht 1.93 m (6' 4\")   Wt 60.8 kg (134 lb)   SpO2 94%   BMI 16.31 kg/m         Plan of Care Reviewed With: patient    Overall Patient Progress: no changeOverall Patient Progress: no change    Outcome Evaluation: VSS. CIWAs. Afebrile.      " caffeine

## 2025-05-01 NOTE — PROGRESS NOTES
[facility]    Medicine Progress Note - Hospitalist Service    Date of Admission:  4/30/2025    Assessment & Plan      Avinash Wray is a 74 year old male with history of bladder cancer and urinary retention requiring self cath, COPD, alcohol dependence presenting for repeated falls.  He is currently residing at the [facility].  He is being followed by the veterans crisis response team and adult protection from [facility].  Drinking 1.75 mL of bourbon every 4 days.  Drink this morning at 7 AM.  Has no appetite for the past couple days.  Admits to coughing more recently.  Is supposed to be performing self cath twice daily, but has only been performing it daily.  Last was 24 hours ago.  He had over 800 mL in bladder.  He was recently hospitalized just over a month ago for sepsis from a urinary source.    Principal Problem:    Sepsis (H)    Generalized weakness    Falls frequently    Pneumonia    Complicated UTI (urinary tract infection)  Workup in the ED revealed WBC 14.3, lactic acid 5.9.  Temp 100.3  There is a left lower lobe opacity.  UA is abnormal.  May have sepsis from UTI and/or pneumonia  Blood cultures NGTD  Urine culture NGTD  Ordered sputum cultures  On ceftriaxone and doxycycline  Lactic acid improved to 2.5 after IVF.   Albuterol nebs as needed  PT and OT to assess  Looking at SNF rehab given weakness    Active Problems:    Hypomagnesemia  Chronic on home replacement  1.1 in ED  Likely due to alcohol and poor nutrition  Replace IV now, per protocol thereafter      Hypophosphatemia  Likely chronic, seen last hospitalization  1.9 in ED, due to alcohol and poor nutrition  Replace      Moderate protein calorie malnutrition  Prealbumin 16.9    Chronic obstructive pulmonary disease, unspecified COPD type (H)  Not on inhalers  Continues to smoke but declined a nicotine patch  as he got a rash the last time       Alcohol dependence with unspecified alcohol-induced disorder  "(H)  Regular alcohol use  [facility] protocol       BPH with urinary retention  Self cath BID.  Continue to follow with urology            Diet: Combination Diet Regular Diet Adult    DVT Prophylaxis: Enoxaparin (Lovenox) SQ  Pearce Catheter: Not present  Lines: None     Cardiac Monitoring: ACTIVE order. Indication: Electrolyte Imbalance (24 hours)- Magnesium <1.3 mg/ml; Potassium < =2.8 or > 5.5 mg/ml  Code Status: No CPR- Do NOT Intubate      Clinically Significant Risk Factors Present on Admission        # Hypokalemia: Lowest K = 3.1 mmol/L in last 2 days, will replace as needed  # Hyponatremia: Lowest Na = 134 mmol/L in last 2 days, will monitor as appropriate  # Hypochloremia: Lowest Cl = 95 mmol/L in last 2 days, will monitor as appropriate  # Hypocalcemia: Lowest Ca = 8.3 mg/dL in last 2 days, will monitor and replace as appropriate   # Hypomagnesemia: Lowest Mg = 1.1 mg/dL in last 2 days, will replace as needed     # Thrombocytopenia: Lowest platelets = 89 in last 2 days, will monitor for bleeding             # Cachexia: Estimated body mass index is 16.31 kg/m  as calculated from the following:    Height as of this encounter: 1.93 m (6' 4\").    Weight as of this encounter: 60.8 kg (134 lb).              Social Drivers of Health    Housing Stability: High Risk (4/30/2025)    Housing Stability     Do you have housing? : Yes     Are you worried about losing your housing?: Yes   Tobacco Use: High Risk (7/10/2024)    Patient History     Smoking Tobacco Use: Every Day     Smokeless Tobacco Use: Never   Alcohol Use: Alcohol Misuse (6/15/2019)    Received from [facility]    AUDIT-C     Frequency of Alcohol Consumption: 4 or more times a week     Average Number of Drinks: 5 or 6   Transportation Needs: High Risk (4/30/2025)    Transportation Needs     Within the past 12 months, has lack of transportation kept you from medical appointments, getting your medicines, non-medical meetings or appointments, " work, or from getting things that you need?: Yes   Physical Activity: Unknown (6/13/2024)    Exercise Vital Sign     Days of Exercise per Week: 2 days   Social Connections: Unknown (6/13/2024)    Social Connection and Isolation Panel [NHANES]     Frequency of Social Gatherings with Friends and Family: Once a week          Disposition Plan     Medically Ready for Discharge: Anticipated in 2-4 Days             Felix Lambert MD  Hospitalist Service  [facility]  Securely message with [employer] (more info)  Text page via [facility] Paging/Directory   ______________________________________________________________________    Interval History   Still feels weak. Hard to sit up in bed. Occasional cough  Agreeable to SNF after discussion with  CRT and     Physical Exam   Vital Signs: Temp: 100.1  F (37.8  C) Temp src: Tympanic BP: 108/56 Pulse: 72   Resp: 16 SpO2: 94 % O2 Device: None (Room air)    Weight: 134 lbs 0 oz    General Appearance: Alert. No acute distress  Chest/Respiratory Exam: slight crackles bilateral bases  Cardiovascular Exam: Regular rate and rhythm. S1, S2, no murmur, gallop, or rubs.  Gastrointestinal Exam: Soft, nontender  Extremities: No lower extremity edema.  Psychiatric: Normal affect and mentation      Medical Decision Making             Data     I have personally reviewed the following data over the past 24 hrs:    11.9 (H)  \   12.1 (L)   / 89 (L)     136 100 10.9 /  101 (H)   3.4 24 0.59 (L) \     ALT: N/A AST: N/A AP: N/A TBILI: N/A   ALB: N/A TOT PROTEIN: N/A LIPASE: N/A     Trop: 24 (H) BNP: N/A     Procal: N/A CRP: N/A Lactic Acid: 2.5 (H)         Imaging results reviewed over the past 24 hrs:   Recent Results (from the past 24 hours)   XR Chest Port 1 View    Narrative    PROCEDURE:  XR CHEST PORT 1 VIEW    HISTORY:  Fever.     COMPARISON:  None.    FINDINGS:   The cardiac silhouette is normal in size. The pulmonary vasculature is  normal.  There is a  perihilar opacity in the left lung. This was not  seen in 2021. This opacity is suspicious for pneumonia but careful  follow-up to clearing is recommended. Emphysematous changes are noted  in both lungs. There is apical pleural thickening seen on the right  which is stable from previous examination No pleural effusion or  pneumothorax.      Impression    IMPRESSION:  Left perihilar opacity suspicious for pneumonia      MARIANA SUMMERS MD         SYSTEM ID:  [id number]   XR Pelvis w Hip Port Left 1 View    Narrative    PROCEDURE: XR PELVIS AND HIP PORTABLE LEFT 1 VIEW 4/30/2025 3:30 PM    HISTORY: fall, left hip pain    COMPARISONS: None.    TECHNIQUE: Pelvis one view, left hip one view    FINDINGS: The pelvis is intact. The sacrum and sacroiliac joints  appear normal. Degenerative changes are present in the lower lumbar  spine. The right proximal femur is intact.    Left hip: There is no left hip fracture or destructive lesion.  Articular spaces are normal in height at the left hip.         Impression    IMPRESSION: No pelvic or left hip fracture.    MARIANA SUMMERS MD         SYSTEM ID:  [id number]   CT Head w/o Contrast    Narrative    PROCEDURE: CT HEAD W/O CONTRAST     HISTORY: fall, etoh.    COMPARISON: 3/21/2025    TECHNIQUE:  Helical images of the head from the foramen magnum to the  vertex were obtained without contrast.    FINDINGS: The ventricles and sulci are normal in volume. No acute  intracranial hemorrhage, mass effect, midline shift, hydrocephalus or  basilar cystern effacement are present.    The grey-white matter interface is preserved.    The calvarium is intact. The mastoid air cells are clear.  The  visualized paranasal sinuses are clear.      Impression    IMPRESSION: Normal brain for age      MARIANA SUMMERS MD         SYSTEM ID:  [id number]   CT Cervical Spine w/o Contrast    Narrative    PROCEDURE: CT CERVICAL SPINE W/O CONTRAST 4/30/2025 4:10 PM    HISTORY: fall    COMPARISONS:  None.    Meds/Dose Given:    TECHNIQUE: CT scan of the cervical spine with sagittal and coronal  reconstructions.    FINDINGS: There is degenerative change at the middle atlantoaxial  joint. There is decrease in height in the C5-C6 and C6-C7 discs. There  is mild forward subluxation of C4 on C5 and  C7 on T1 due to facet  joint degenerative changes. There are no fractures of the vertebral  bodies or arches. Advanced facet joint degenerative changes are noted  worse on the left right. Calcific plaquing at the carotid  bifurcations. The prevertebral soft tissues are otherwise normal.         Impression    IMPRESSION: Advanced degenerative changes of the cervical spine no  acute fractures.    MARIANA SUMMERS MD         SYSTEM ID:  [id number]

## 2025-05-01 NOTE — PLAN OF CARE
Alert and oriented x 4. VSS, afebrile. C/o left hip pain 1/10 declines intervention - pt reports multiple falls in the past week. CIWA score 2-3 on shifts with no intervention needed. Infrequent, productive cough - still needing sputum sample. LS clear - on room air. Pt reports BUCHANAN/SOB. Long-standing urinary retention - pt straight cath's at home. Bladder scanning and straight cath on shift every 4-6 hours. Urine slow to empty in catheter. Dark humberto urine, odorous. Straight cath x 2 on shift. Unable to void on own. Order for matta from provider - pt refused. Ambulating with 1 person assist with gait belt and walker.     Audrey Joya RN .......  5/1/2025  3:19 AM      Goal Outcome Evaluation:      Plan of Care Reviewed With: patient    Overall Patient Progress: no change    Outcome Evaluation: VSS, afebrile, on telemetry, CIWA's q 4 hours

## 2025-05-01 NOTE — PROGRESS NOTES
"Clinical Nutrition / Initial Assessment     Reason for Assessment:  Screened at nutritional risk due to:  Recent weight loss without trying    Assessment:   Client History:  pt admitted with sepsis, failure to thrive home alone (has been living at a motel in town), alcohol abuse. Has not been eating the past few days as he \" was too lazy to cook\". Suspect excessive alcohol intake with minimal nutrition. Pt is weak and thin.   Diet Order:  regular   Oral Intake:  25-75% so far here   Supplement Intake:  will add TID to trays at this time to promote nutrition status   Weight:   Wt Readings from Last 10 Encounters:   04/30/25 60.8 kg (134 lb)   03/23/25 61.1 kg (134 lb 9.6 oz)   07/10/24 72.3 kg (159 lb 6.4 oz)   07/03/24 70.8 kg (156 lb)   06/13/24 70.8 kg (156 lb)   12/14/21 83.9 kg (185 lb)   12/12/21 83.9 kg (185 lb)   03/04/21 87.1 kg (192 lb)   02/25/21 84.6 kg (186 lb 9.6 oz)   11/12/20 79.4 kg (175 lb)      Estimated nutritional needs based on:  ideal body weight 87 kg / 191 lbs   Estimated energy needs:  3241-2699 kcal/day  Estimated protein needs:   gm/day (1-1.2 g/kg)  Estimated fluid needs:  3605-3216 ml/day (1 ml/kcal)    Malnutrition Criteria:  (Need to have 2 indicators to qualify recommendation)  Energy Intake:  Social or Environmental Moderate: < 75% of estimated energy requirement for >/= 3 months  Interpretation of Weight Loss:  Social or Environmental Severe:   >10% in 6 months  Physical Findings:  Social or Environmental Body Fat Loss:  severe and Social or Environmental Muscle Mass Loss:  severe  Reduced  Strength:  Not Measured    Recommended Nutrition Diagnosis:   Severe Malnutrition in the context of social or environmental Circumstances - based on AND/ASPEN Clinical Characterstics of Malnutrition May 2012  Malnutrition:    - Level of malnutrition: Severe       Nutrition Education: Nutrition education will be provided as appropriate.    Nutrition Diagnosis: Weight:   wt loss related " to inadequate energy intakes as evidenced by BMI under 20, 14% wt loss in past 9 months.    Intervention:  Nutrition Prescription:     Nutrition Intervention(s):Recommended general, healthful diet  1. Meals and Snacks: encourage intakes   2. Medical Food Supplement: Supplements TID on trays     Nutrition Goal(s):  1. Pt will consume 75% or more of foods and fluids  2. Pt will tolerate diet as ordered  3. Pt will not have unplanned wt loss during hospitalization     Monitoring and Evaluation:   Food Intake    Discharge Recommendation:   Nutrition Discharge Planning  Recommend supplements on discharge. Recommend a care facility that provides consistent meals as pt has demonstrated that he will not always make food for himself if home alone.     RD will reassess in within 1-5 days or sooner.    Fabiana Saldivar RD on 5/1/2025 at 3:24 PM

## 2025-05-01 NOTE — PROGRESS NOTES
Interdisciplinary Discharge Planning Note    Anticipated Discharge Date: 5/3    Anticipated Discharge Location: SNF    Clinical Needs Before Discharge:  fever subsiding and stable functional status    Treatment Needs After Discharge:  rehab (PT, OT, ST)    Potential Barriers to Discharge: Finding placement     JODY Sen  5/1/2025,  3:23 PM

## 2025-05-01 NOTE — PROGRESS NOTES
Telemetry tech from ICU called to report HR  rapidly bouncing around with possible Afib. Tele-nocturnist notified, Dr. Robert. EKG ordered.    No new orders obtained.     Audrey Joya RN .......  4/30/2025  8:44 PM

## 2025-05-02 ENCOUNTER — APPOINTMENT (OUTPATIENT)
Dept: SPEECH THERAPY | Facility: OTHER | Age: 75
End: 2025-05-02
Attending: FAMILY MEDICINE
Payer: COMMERCIAL

## 2025-05-02 LAB
ANION GAP SERPL CALCULATED.3IONS-SCNC: 10 MMOL/L (ref 7–15)
BUN SERPL-MCNC: 9.2 MG/DL (ref 8–23)
CALCIUM SERPL-MCNC: 8 MG/DL (ref 8.8–10.4)
CHLORIDE SERPL-SCNC: 101 MMOL/L (ref 98–107)
CREAT SERPL-MCNC: 0.51 MG/DL (ref 0.67–1.17)
CREAT SERPL-MCNC: 0.52 MG/DL (ref 0.67–1.17)
EGFRCR SERPLBLD CKD-EPI 2021: >90 ML/MIN/1.73M2
EGFRCR SERPLBLD CKD-EPI 2021: >90 ML/MIN/1.73M2
ERYTHROCYTE [DISTWIDTH] IN BLOOD BY AUTOMATED COUNT: 13.3 % (ref 10–15)
GLUCOSE SERPL-MCNC: 110 MG/DL (ref 70–99)
HCO3 SERPL-SCNC: 23 MMOL/L (ref 22–29)
HCT VFR BLD AUTO: 31.4 % (ref 40–53)
HGB BLD-MCNC: 11 G/DL (ref 13.3–17.7)
HOLD SPECIMEN: NORMAL
HOLD SPECIMEN: NORMAL
MAGNESIUM SERPL-MCNC: 1.4 MG/DL (ref 1.7–2.3)
MAGNESIUM SERPL-MCNC: 1.9 MG/DL (ref 1.7–2.3)
MCH RBC QN AUTO: 35.9 PG (ref 26.5–33)
MCHC RBC AUTO-ENTMCNC: 35 G/DL (ref 31.5–36.5)
MCV RBC AUTO: 103 FL (ref 78–100)
PHOSPHATE SERPL-MCNC: 1.9 MG/DL (ref 2.5–4.5)
PHOSPHATE SERPL-MCNC: 2.3 MG/DL (ref 2.5–4.5)
PLATELET # BLD AUTO: 89 10E3/UL (ref 150–450)
POTASSIUM SERPL-SCNC: 3.2 MMOL/L (ref 3.4–5.3)
POTASSIUM SERPL-SCNC: 3.3 MMOL/L (ref 3.4–5.3)
POTASSIUM SERPL-SCNC: 3.9 MMOL/L (ref 3.4–5.3)
RBC # BLD AUTO: 3.06 10E6/UL (ref 4.4–5.9)
SODIUM SERPL-SCNC: 134 MMOL/L (ref 135–145)
WBC # BLD AUTO: 9.9 10E3/UL (ref 4–11)

## 2025-05-02 PROCEDURE — 36415 COLL VENOUS BLD VENIPUNCTURE: CPT | Performed by: FAMILY MEDICINE

## 2025-05-02 PROCEDURE — 85027 COMPLETE CBC AUTOMATED: CPT | Performed by: FAMILY MEDICINE

## 2025-05-02 PROCEDURE — 84100 ASSAY OF PHOSPHORUS: CPT | Performed by: FAMILY MEDICINE

## 2025-05-02 PROCEDURE — 258N000003 HC RX IP 258 OP 636: Performed by: FAMILY MEDICINE

## 2025-05-02 PROCEDURE — 82565 ASSAY OF CREATININE: CPT | Performed by: FAMILY MEDICINE

## 2025-05-02 PROCEDURE — 92610 EVALUATE SWALLOWING FUNCTION: CPT | Mod: GN | Performed by: SPEECH-LANGUAGE PATHOLOGIST

## 2025-05-02 PROCEDURE — 250N000011 HC RX IP 250 OP 636: Performed by: FAMILY MEDICINE

## 2025-05-02 PROCEDURE — 80048 BASIC METABOLIC PNL TOTAL CA: CPT | Performed by: FAMILY MEDICINE

## 2025-05-02 PROCEDURE — 120N000001 HC R&B MED SURG/OB

## 2025-05-02 PROCEDURE — 99232 SBSQ HOSP IP/OBS MODERATE 35: CPT | Performed by: FAMILY MEDICINE

## 2025-05-02 PROCEDURE — 83735 ASSAY OF MAGNESIUM: CPT | Performed by: FAMILY MEDICINE

## 2025-05-02 PROCEDURE — 84132 ASSAY OF SERUM POTASSIUM: CPT | Performed by: FAMILY MEDICINE

## 2025-05-02 PROCEDURE — 250N000013 HC RX MED GY IP 250 OP 250 PS 637: Performed by: FAMILY MEDICINE

## 2025-05-02 PROCEDURE — 250N000009 HC RX 250: Performed by: FAMILY MEDICINE

## 2025-05-02 RX ORDER — MAGNESIUM SULFATE HEPTAHYDRATE 40 MG/ML
4 INJECTION, SOLUTION INTRAVENOUS ONCE
Status: COMPLETED | OUTPATIENT
Start: 2025-05-02 | End: 2025-05-02

## 2025-05-02 RX ORDER — SACCHAROMYCES BOULARDII 250 MG
250 CAPSULE ORAL 2 TIMES DAILY
Status: DISCONTINUED | OUTPATIENT
Start: 2025-05-02 | End: 2025-05-03 | Stop reason: HOSPADM

## 2025-05-02 RX ORDER — AMPICILLIN AND SULBACTAM 2; 1 G/1; G/1
3 INJECTION, POWDER, FOR SOLUTION INTRAMUSCULAR; INTRAVENOUS EVERY 6 HOURS
Status: DISCONTINUED | OUTPATIENT
Start: 2025-05-02 | End: 2025-05-03

## 2025-05-02 RX ORDER — PANTOPRAZOLE SODIUM 40 MG/1
40 TABLET, DELAYED RELEASE ORAL
Status: DISCONTINUED | OUTPATIENT
Start: 2025-05-02 | End: 2025-05-03 | Stop reason: HOSPADM

## 2025-05-02 RX ORDER — POTASSIUM CHLORIDE 1500 MG/1
40 TABLET, EXTENDED RELEASE ORAL ONCE
Status: COMPLETED | OUTPATIENT
Start: 2025-05-02 | End: 2025-05-02

## 2025-05-02 RX ADMIN — Medication 1 TABLET: at 10:29

## 2025-05-02 RX ADMIN — Medication 250 MG: at 11:47

## 2025-05-02 RX ADMIN — SODIUM PHOSPHATE, MONOBASIC, MONOHYDRATE AND SODIUM PHOSPHATE, DIBASIC, ANHYDROUS 15 MMOL: 142; 276 INJECTION, SOLUTION INTRAVENOUS at 11:45

## 2025-05-02 RX ADMIN — THIAMINE HCL TAB 100 MG 100 MG: 100 TAB at 10:29

## 2025-05-02 RX ADMIN — DOXYCYCLINE 100 MG: 100 INJECTION, POWDER, LYOPHILIZED, FOR SOLUTION INTRAVENOUS at 10:28

## 2025-05-02 RX ADMIN — DOXYCYCLINE 100 MG: 100 INJECTION, POWDER, LYOPHILIZED, FOR SOLUTION INTRAVENOUS at 22:03

## 2025-05-02 RX ADMIN — POTASSIUM & SODIUM PHOSPHATES POWDER PACK 280-160-250 MG 1 PACKET: 280-160-250 PACK at 22:02

## 2025-05-02 RX ADMIN — TAMSULOSIN HYDROCHLORIDE 0.4 MG: 0.4 CAPSULE ORAL at 19:42

## 2025-05-02 RX ADMIN — POTASSIUM CHLORIDE 40 MEQ: 1500 TABLET, EXTENDED RELEASE ORAL at 16:38

## 2025-05-02 RX ADMIN — FOLIC ACID 1 MG: 1 TABLET ORAL at 10:29

## 2025-05-02 RX ADMIN — AMPICILLIN SODIUM AND SULBACTAM SODIUM 3 G: 2; 1 INJECTION, POWDER, FOR SOLUTION INTRAMUSCULAR; INTRAVENOUS at 15:52

## 2025-05-02 RX ADMIN — MAGNESIUM SULFATE HEPTAHYDRATE 4 G: 4 INJECTION, SOLUTION INTRAVENOUS at 07:35

## 2025-05-02 RX ADMIN — POTASSIUM CHLORIDE 40 MEQ: 1500 TABLET, EXTENDED RELEASE ORAL at 07:48

## 2025-05-02 RX ADMIN — ENOXAPARIN SODIUM 30 MG: 30 INJECTION SUBCUTANEOUS at 22:00

## 2025-05-02 RX ADMIN — POTASSIUM & SODIUM PHOSPHATES POWDER PACK 280-160-250 MG 1 PACKET: 280-160-250 PACK at 19:42

## 2025-05-02 RX ADMIN — SODIUM CHLORIDE: 9 INJECTION, SOLUTION INTRAVENOUS at 07:11

## 2025-05-02 RX ADMIN — AMPICILLIN SODIUM AND SULBACTAM SODIUM 3 G: 2; 1 INJECTION, POWDER, FOR SOLUTION INTRAMUSCULAR; INTRAVENOUS at 19:43

## 2025-05-02 RX ADMIN — FINASTERIDE 5 MG: 5 TABLET, FILM COATED ORAL at 10:29

## 2025-05-02 RX ADMIN — PANTOPRAZOLE SODIUM 40 MG: 40 TABLET, DELAYED RELEASE ORAL at 17:46

## 2025-05-02 RX ADMIN — Medication 250 MG: at 19:42

## 2025-05-02 ASSESSMENT — ACTIVITIES OF DAILY LIVING (ADL)
ADLS_ACUITY_SCORE: 63

## 2025-05-02 NOTE — PROGRESS NOTES
:    Received message from [facility] and they are declining patient at this time.     Met with patient to update him about this. He was agreeable to having referrals sent to [facility] in [place], [facility] in [place], [facility] in [place], as well as [facility].    Updated Marilee Batista and Isi Pinedo at [facility] as well as True Clancy at 's Crisis Response Team about the referrals.    JODY Sen on 5/2/2025 at 1:42 PM

## 2025-05-02 NOTE — PLAN OF CARE
"Goal Outcome Evaluation:  VSS, temp elevated. Patient had some urinary incontinence. Bladder scanned at 595, patient straight cath himself and 600 mL out. 1 episode of diarrhea. Asst 1 with walker. CIWA score 1 throughout night. Blanchable redness to left shoulder, Allevyn applied.       Plan of Care Reviewed With: patient    Overall Patient Progress: improvingOverall Patient Progress: improving    Outcome Evaluation: VSS    Blood pressure 107/57, pulse 65, temperature 100.1  F (37.8  C), temperature source Tympanic, resp. rate 16, height 1.93 m (6' 4\"), weight 64.4 kg (141 lb 14.4 oz), SpO2 93%.    Perri Schuler RN on 5/2/2025 at 5:53 AM    "

## 2025-05-02 NOTE — PROGRESS NOTES
SAFETY CHECKLIST  ID Bands and Risk clasps correct and in place (DNR, Fall risk, Allergy, Latex, Limb):  Yes  All Lines Reconciled and labeled correctly: Yes  Whiteboard updated:Yes  Environmental interventions: Yes  Verify Tele #:  1  Perri Schuler RN on 5/1/2025 at 7:28 PM

## 2025-05-02 NOTE — PROGRESS NOTES
[facility]    Medicine Progress Note - Hospitalist Service    Date of Admission:  4/30/2025    Assessment & Plan      Avinash Wray is a 74 year old male with history of bladder cancer and urinary retention requiring self cath, COPD, alcohol dependence presenting for repeated falls.  He is currently residing at the [facility].  He is being followed by the veterans crisis response team and adult protection from [facility].  Drinking 1.75 mL of bourbon every 4 days.  Drink this morning at 7 AM.  Has no appetite for the past couple days.  Admits to coughing more recently.  Is supposed to be performing self cath twice daily, but has only been performing it daily.  Last was 24 hours ago.  He had over 800 mL in bladder.  He was recently hospitalized just over a month ago for sepsis from a urinary source.    Principal Problem:    Sepsis (H)    Generalized weakness    Falls frequently    Pneumonia  Workup in the ED revealed WBC 14.3, lactic acid 5.9.  Temp 100.3  There is a left lower lobe opacity.  UA is abnormal.  May have sepsis from UTI and/or pneumonia  Blood cultures NGTD  Urine culture NGTD, favor pneumonia as etiology  Ordered sputum cultures  Lactic acid improved to 2.5 after IVF.   Albuterol nebs as needed  On ceftriaxone and doxycycline for 2 days. Temp still elevated. Consider aspiration. Evaluation by SLP  Change to Unasyn and doxycycline  PT and OT to assess  Looking at SNF rehab given weakness      Complicated UTI (urinary tract infection)  Urine culture NGTD    Active Problems:    Hypomagnesemia  Chronic on home replacement  1.1 in ED  Likely due to alcohol and poor nutrition  Replace IV now, per protocol thereafter      Hypophosphatemia  Likely chronic, seen last hospitalization  1.9 in ED, due to alcohol and poor nutrition  Replace      Moderate protein calorie malnutrition  Prealbumin 16.9    Chronic obstructive pulmonary disease, unspecified COPD type (H)  Not on  "inhalers  Continues to smoke but declined a nicotine patch  as he got a rash the last time       Alcohol dependence with unspecified alcohol-induced disorder (H)  Regular alcohol use  CIWA protocol low scores, no withdrawal. Discontinue protocol       BPH with urinary retention  Self cath BID.  Continue to follow with urology            Diet: Combination Diet Regular Diet Adult  Snacks/Supplements Adult: Ensure Enlive; With Meals    DVT Prophylaxis: Enoxaparin (Lovenox) SQ  Pearce Catheter: Not present  Lines: None     Cardiac Monitoring: None  Code Status: No CPR- Do NOT Intubate      Clinically Significant Risk Factors        # Hypokalemia: Lowest K = 3.1 mmol/L in last 2 days, will replace as needed  # Hyponatremia: Lowest Na = 134 mmol/L in last 2 days, will monitor as appropriate  # Hypochloremia: Lowest Cl = 95 mmol/L in last 2 days, will monitor as appropriate  # Hypocalcemia: Lowest Ca = 8 mg/dL in last 2 days, will monitor and replace as appropriate   # Hypomagnesemia: Lowest Mg = 1.1 mg/dL in last 2 days, will replace as needed     # Thrombocytopenia: Lowest platelets = 89 in last 2 days, will monitor for bleeding              # Cachexia: Estimated body mass index is 17.27 kg/m  as calculated from the following:    Height as of this encounter: 1.93 m (6' 4\").    Weight as of this encounter: 64.4 kg (141 lb 14.4 oz)., PRESENT ON ADMISSION  # Severe Malnutrition: based on nutrition assessment and treatment provided per dietitian's recommendations., PRESENT ON ADMISSION          Social Drivers of Health    Housing Stability: High Risk (4/30/2025)    Housing Stability     Do you have housing? : Yes     Are you worried about losing your housing?: Yes   Tobacco Use: High Risk (7/10/2024)    Patient History     Smoking Tobacco Use: Every Day     Smokeless Tobacco Use: Never   Alcohol Use: Alcohol Misuse (6/15/2019)    Received from [facility]    AUDIT-C     Frequency of Alcohol Consumption: 4 or " more times a week     Average Number of Drinks: 5 or 6   Transportation Needs: High Risk (4/30/2025)    Transportation Needs     Within the past 12 months, has lack of transportation kept you from medical appointments, getting your medicines, non-medical meetings or appointments, work, or from getting things that you need?: Yes   Physical Activity: Unknown (6/13/2024)    Exercise Vital Sign     Days of Exercise per Week: 2 days   Social Connections: Unknown (6/13/2024)    Social Connection and Isolation Panel [NHANES]     Frequency of Social Gatherings with Friends and Family: Once a week          Disposition Plan     Medically Ready for Discharge: Anticipated in 2-4 Days             Felix Lambert MD  Hospitalist Service  [facility]  Securely message with [employer] (more info)  Text page via [facility] Paging/Directory   ______________________________________________________________________    Interval History   Urinary incontinence noted. Continues self straight cath  Diarrhea this morning  Minimal CIWA score  Still feels weak. Hard to sit up in bed, but able to get up to bathroom.  Occasional cough    Physical Exam   Vital Signs: Temp: 99.9  F (37.7  C) Temp src: Tympanic BP: 108/56 Pulse: 57   Resp: 14 SpO2: 92 % O2 Device: None (Room air)    Weight: 141 lbs 14.4 oz    General Appearance: Alert. No acute distress  Chest/Respiratory Exam: crackles left base. No wheezing  Cardiovascular Exam: Regular rate and rhythm. S1, S2, no murmur, gallop, or rubs.  Gastrointestinal Exam: Soft, nontender  Extremities: No lower extremity edema.  Psychiatric: Normal affect and mentation      Medical Decision Making             Data     I have personally reviewed the following data over the past 24 hrs:    9.9  \   11.0 (L)   / 89 (L)     134 (L) 101 9.2 /  110 (H)   3.2 (L) 23 0.52 (L) \       Imaging results reviewed over the past 24 hrs:   No results found for this or any previous visit (from the past 24  hours).

## 2025-05-02 NOTE — PROGRESS NOTES
05/02/25 1700   Appointment Info   Signing Clinician's Name / Credentials (SLP) Brandon Wylie MA, CCC-SLP   General Information   Onset of Illness/Injury or Date of Surgery 04/30/25   Referring Physician Dr. Felix Lambert   Patient/Family Therapy Goal Statement (SLP) Determine safety of swallow   Pertinent History of Current Problem Sepsis with alcohol dependency   General Observations Patient sitting upright in bed, patient briefly interacting with SLP.   Type of Evaluation   Type of Evaluation Swallow Evaluation   Oral Motor   Oral Musculature generally intact   Structural Abnormalities none present   Mucosal Quality adequate   Facial Symmetry (Oral Motor)   Facial Symmetry (Oral Motor) WNL   Lip Function (Oral Motor)   Lip Range of Motion (Oral Motor) WNL   Tongue Function (Oral Motor)   Tongue Strength (Oral Motor) WFL   Tongue Coordination/Speed (Oral Motor) WNL   Tongue ROM (Oral Motor) WNL   Jaw Function (Oral Motor)   Jaw Function (Oral Motor) WNL   Vocal Quality/Secretion Management (Oral Motor)   Vocal Quality (Oral Motor) WFL   Secretion Management (Oral Motor) WNL   General Swallowing Observations   Respiratory Support room air   Current Diet/Method of Nutritional Intake (General Swallowing Observations, NIS) regular diet   Swallowing Evaluation Clinical swallow evaluation   Clinical Swallow Evaluation   Feeding Assistance no assistance needed   Clinical Swallow Evaluation Textures Trialed thin liquids;solid foods   Clinical Swallow Eval: Thin Liquid Texture Trial   Mode of Presentation, Thin Liquids cup   Volume of Liquid or Food Presented 4 oz.   Oral Phase of Swallow WFL   Pharyngeal Phase of Swallow intact   Diagnostic Statement Patient administered 10 trials of thin liquids via cup and straw, with no clinical signs of penetration or aspiration present. Patient sequential drinking with adequate hyolaryngeal excursion and no changes in voice upon phonation of /a/. Thin liquids deemed safe for  patient consumption.   Clinical Swallow Evaluation: Solid Food Texture Trial   Mode of Presentation self-fed   Volume Presented 2 crackers   Oral Phase WFL   Pharyngeal Phase intact   Diagnostic Statement Patient administered 5 trials of solid foods with crackers, patient demonstrating slightly prolonged but adequate mastication with trials and no clinical signs of penetration or aspiration observed. Patient demonstrating clear voice quality without oral residue or pocketing upon SLP observation. Regular textures deemed safe for patient consumption.   Esophageal Phase of Swallow   Patient reports or presents with symptoms of esophageal dysphagia No   Swallowing Recommendations   Diet Consistency Recommendations regular diet   Supervision Level for Intake patient independent   Mode of Delivery Recommendations bolus size, small;slow rate of intake   Monitoring/Assistance Required (Eating/Swallowing) check mouth frequently for oral residue/pocketing;monitor for cough or change in vocal quality with intake;stop eating activities when fatigue is present   Recommended Feeding/Eating Techniques (Swallow Eval) patient is independent, no specific recommendations   Medication Administration Recommendations, Swallowing (SLP) Via thins or crushed if able   Comment, Swallowing Recommendations Recommend patient to be monitored on regular diet by nursing staff for coughing and other signs of fatigue from regular diet. Patient tolerating clinical swallow trials, although SLP recommends small bites and sips with a slow rate of intake to promote patient safety.   Clinical Impression   Criteria for Skilled Therapeutic Interventions Met (SLP Eval) Evaluation only   SLP Diagnosis Mild oropharyngeal dysphagia   Problem List (SLP) Sepsis   Risks & Benefits of therapy have been explained evaluation/treatment results reviewed;risks/benefits reviewed   Clinical Impression Comments Patient independent with feeding and tolerating regular diet,  staff to monitor for changes over patient's stay.   SLP Total Evaluation Time   Eval: oral/pharyngeal swallow function, clinical swallow Minutes ([id number]) 30   SLP Discharge Planning   SLP Plan Patient to continue regular diet and be monitored by nursing staff for changes in vocal quality or excess coughing.   SLP Discharge Recommendation Transitional Care Facility   SLP Rationale for DC Rec Patient impacted by alcohol dependency and requiring additional care.   SLP Brief overview of current status  Patient demonstrating mild oropharyngeal dysphagia, but tolerating regular textures well throughout assessment. Patient to discharge 5/3/2025.   SLP Time and Intention   Total Session Time (sum of timed and untimed services) 30   Psychosocial Support   Trust Relationship/Rapport care explained;choices provided;questions answered;questions encouraged

## 2025-05-02 NOTE — PROGRESS NOTES
Interdisciplinary Discharge Planning Note    Anticipated Discharge Date: TBD    Anticipated Discharge Location: SNF    Clinical Needs Before Discharge:  adequate fluid and/or nutritional intake and stable functional status    Treatment Needs After Discharge:  rehab (PT, OT, ST)    Potential Barriers to Discharge: Finding placement     JODY Sen  5/2/2025,  1:43 PM

## 2025-05-02 NOTE — PLAN OF CARE
Goal Outcome Evaluation:    A/O, VSS, afebrile, on room air.  Mag, Potassium, phosphorus replaced today.  Pt has urinary retention and self caths at home.  Pt reports usually straight caths at about 800mL and uses 18 Yemeni catheter.  Straight cath at 1300 for 850mL humberto urine.  Pt is up with assist of 1, gait belt, and walker.  Allevyn on LT upper shoulder for area of redness.  No episodes of diarrhea during the day.          Plan of Care Reviewed With: patient    Overall Patient Progress: improvingOverall Patient Progress: improving    Outcome Evaluation: VSS; Afebrile, on room air

## 2025-05-03 VITALS
RESPIRATION RATE: 18 BRPM | HEART RATE: 52 BPM | OXYGEN SATURATION: 93 % | TEMPERATURE: 98.7 F | HEIGHT: 76 IN | DIASTOLIC BLOOD PRESSURE: 69 MMHG | WEIGHT: 144.3 LBS | SYSTOLIC BLOOD PRESSURE: 130 MMHG | BODY MASS INDEX: 17.57 KG/M2

## 2025-05-03 LAB
ANION GAP SERPL CALCULATED.3IONS-SCNC: 10 MMOL/L (ref 7–15)
BACTERIA UR CULT: ABNORMAL
BUN SERPL-MCNC: 6.7 MG/DL (ref 8–23)
CALCIUM SERPL-MCNC: 7.9 MG/DL (ref 8.8–10.4)
CHLORIDE SERPL-SCNC: 101 MMOL/L (ref 98–107)
CREAT SERPL-MCNC: 0.49 MG/DL (ref 0.67–1.17)
EGFRCR SERPLBLD CKD-EPI 2021: >90 ML/MIN/1.73M2
ERYTHROCYTE [DISTWIDTH] IN BLOOD BY AUTOMATED COUNT: 13.4 % (ref 10–15)
GLUCOSE SERPL-MCNC: 96 MG/DL (ref 70–99)
HCO3 SERPL-SCNC: 23 MMOL/L (ref 22–29)
HCT VFR BLD AUTO: 31.9 % (ref 40–53)
HGB BLD-MCNC: 11.3 G/DL (ref 13.3–17.7)
HOLD SPECIMEN: NORMAL
HOLD SPECIMEN: NORMAL
MAGNESIUM SERPL-MCNC: 1.3 MG/DL (ref 1.7–2.3)
MCH RBC QN AUTO: 36.1 PG (ref 26.5–33)
MCHC RBC AUTO-ENTMCNC: 35.4 G/DL (ref 31.5–36.5)
MCV RBC AUTO: 102 FL (ref 78–100)
PHOSPHATE SERPL-MCNC: 2.9 MG/DL (ref 2.5–4.5)
PLATELET # BLD AUTO: 97 10E3/UL (ref 150–450)
POTASSIUM SERPL-SCNC: 3.2 MMOL/L (ref 3.4–5.3)
RBC # BLD AUTO: 3.13 10E6/UL (ref 4.4–5.9)
SODIUM SERPL-SCNC: 134 MMOL/L (ref 135–145)
WBC # BLD AUTO: 9.7 10E3/UL (ref 4–11)

## 2025-05-03 PROCEDURE — 250N000011 HC RX IP 250 OP 636: Performed by: FAMILY MEDICINE

## 2025-05-03 PROCEDURE — 84100 ASSAY OF PHOSPHORUS: CPT | Performed by: FAMILY MEDICINE

## 2025-05-03 PROCEDURE — 250N000013 HC RX MED GY IP 250 OP 250 PS 637: Performed by: FAMILY MEDICINE

## 2025-05-03 PROCEDURE — 80048 BASIC METABOLIC PNL TOTAL CA: CPT | Performed by: FAMILY MEDICINE

## 2025-05-03 PROCEDURE — 83735 ASSAY OF MAGNESIUM: CPT | Performed by: FAMILY MEDICINE

## 2025-05-03 PROCEDURE — 36415 COLL VENOUS BLD VENIPUNCTURE: CPT | Performed by: FAMILY MEDICINE

## 2025-05-03 PROCEDURE — 85018 HEMOGLOBIN: CPT | Performed by: FAMILY MEDICINE

## 2025-05-03 PROCEDURE — 99239 HOSP IP/OBS DSCHRG MGMT >30: CPT | Performed by: FAMILY MEDICINE

## 2025-05-03 RX ORDER — MAGNESIUM SULFATE HEPTAHYDRATE 40 MG/ML
4 INJECTION, SOLUTION INTRAVENOUS ONCE
Status: COMPLETED | OUTPATIENT
Start: 2025-05-03 | End: 2025-05-03

## 2025-05-03 RX ORDER — POTASSIUM CHLORIDE 1500 MG/1
40 TABLET, EXTENDED RELEASE ORAL ONCE
Status: COMPLETED | OUTPATIENT
Start: 2025-05-03 | End: 2025-05-03

## 2025-05-03 RX ORDER — LEVOFLOXACIN 750 MG/1
750 TABLET, FILM COATED ORAL DAILY
Status: DISCONTINUED | OUTPATIENT
Start: 2025-05-03 | End: 2025-05-03 | Stop reason: HOSPADM

## 2025-05-03 RX ORDER — LEVOFLOXACIN 750 MG/1
750 TABLET, FILM COATED ORAL DAILY
Qty: 5 TABLET | Refills: 0 | Status: SHIPPED | OUTPATIENT
Start: 2025-05-04 | End: 2025-05-09

## 2025-05-03 RX ADMIN — LEVOFLOXACIN 750 MG: 750 TABLET, FILM COATED ORAL at 09:48

## 2025-05-03 RX ADMIN — AMPICILLIN SODIUM AND SULBACTAM SODIUM 3 G: 2; 1 INJECTION, POWDER, FOR SOLUTION INTRAMUSCULAR; INTRAVENOUS at 02:24

## 2025-05-03 RX ADMIN — POTASSIUM & SODIUM PHOSPHATES POWDER PACK 280-160-250 MG 1 PACKET: 280-160-250 PACK at 02:24

## 2025-05-03 RX ADMIN — SODIUM CHLORIDE: 9 INJECTION, SOLUTION INTRAVENOUS at 02:23

## 2025-05-03 RX ADMIN — PANTOPRAZOLE SODIUM 40 MG: 40 TABLET, DELAYED RELEASE ORAL at 07:44

## 2025-05-03 ASSESSMENT — ACTIVITIES OF DAILY LIVING (ADL)
ADLS_ACUITY_SCORE: 63

## 2025-05-03 NOTE — PLAN OF CARE
Goal Outcome Evaluation:      Plan of Care Reviewed With: patient    Overall Patient Progress: no change    Outcome Evaluation: Requesting discharge

## 2025-05-03 NOTE — PROGRESS NOTES
VSS, afebrile. C/O chronic back pain, refuse pain medication. Dr. Farfan and Charge RN made aware that pt is refusing all medications. After discussion w/MD, pt agreed to take Levaquin 750mg  po. This was administered per order. Pt non compliant with NS IV and is requesting discharge. Education and benefits discussed with pt for medication administration.

## 2025-05-03 NOTE — PHARMACY - DISCHARGE MEDICATION RECONCILIATION AND EDUCATION
Pharmacy:  Discharge Counseling and Medication Reconciliation    Avinash Wray  [address]  [place] [address]  [phone] (home)   74 year old male  PCP: Soledad Person    Allergies: Patient has no known allergies.    Discharge Counseling:    Pharmacist met with patient (and/or family) today to review the medication portion of the After Visit Summary (with an emphasis on NEW medications) and to address patient's questions/concerns.    Summary of Education:   Levofloxacin: Patient is starting levofloxacin outpatient. Patient was educated on the importance of adherence, how to take, and side effects. Patient stated that he was going to  his medication today.     Materials Provided:  MedCounselor sheets printed from Clinical Pharmacology on: Levofloxacin    Discharge Medication Reconciliation:    It has been determined that the patient has an adequate supply of medications available or which can be obtained from the patient's preferred pharmacy, which HE/SHE has confirmed as: [facility] [An updated medication list will be faxed to the patient's pharmacy.]    Thank you for the consult.    [facility]........May 3, 2025 10:36 AM

## 2025-05-03 NOTE — DISCHARGE SUMMARY
"[facility]  Hospitalist Discharge Summary      Date of Admission:  4/30/2025  Date of Discharge:  5/3/2025  Discharging Provider: Felix Lambert MD  Discharge Service: Hospitalist Service    Discharge Diagnoses   Principal Problem:    Sepsis without acute organ dysfunction (H)  Active Problems:    Hypomagnesemia    Hypophosphatemia    Generalized weakness    Falls frequently    Pneumonia    Complicated UTI (urinary tract infection)      Clinically Significant Risk Factors     # Cachexia: Estimated body mass index is 17.56 kg/m  as calculated from the following:    Height as of this encounter: 1.93 m (6' 4\").    Weight as of this encounter: 65.5 kg (144 lb 4.8 oz).    # Severe Malnutrition: based on nutrition assessment and treatment provided per dietitian's recommendations.      Follow-ups Needed After Discharge   Follow-up Appointments       Follow-up and recommended labs and tests       Follow up with primary care provider, Soledad Person, within 7 days for hospital follow- up.  The following labs/tests are recommended: BMP, magnesium.                Unresulted Labs Ordered in the Past 30 Days of this Admission       Date and Time Order Name Status Description    4/30/2025  6:10 PM Respiratory Aerobic Bacterial Culture with Gram Stain Preliminary     4/30/2025  2:52 PM Blood Culture Peripheral Blood Preliminary     4/30/2025  2:52 PM Blood Culture Peripheral Blood Preliminary         These results will be followed up by hospitalist pool    Discharge Disposition   Discharged to home  Condition at discharge: Stable    Hospital Course   Avinash Wray is a 74 year old male with history of bladder cancer and urinary retention requiring self cath, COPD, alcohol dependence presenting for repeated falls.  He is currently residing at the [facility].  He is being followed by the veterans crisis response team and adult protection from [facility].  Drinking 1.75 mL of bourbon every 4 days.  " Drink this morning at 7 AM.  Has no appetite for the past couple days.  Admits to coughing more recently.  Is supposed to be performing self cath twice daily, but has only been performing it daily.  Last was 24 hours ago.  He had over 800 mL in bladder.  He was recently hospitalized just over a month ago for sepsis from a urinary source.    Principal Problem:    Sepsis (H)    Generalized weakness    Falls frequently  Workup in the ED revealed WBC 14.3, lactic acid 5.9.  Temp 100.3  There is a left lower lobe opacity.  UA is abnormal.  Lactic acid improved to 2.5 after IVF.   Blood cultures NGTD  After treatment appears to have sepsis from UTI and pneumonia  On ceftriaxone and doxycycline for 2 days. Temp still elevated, changed to Unasyn and doxycycline for potential aspiration.   Urine culture returned with staph epi resistant to oxacillin and penicillin.   Given Levaquin 750 mg PO  Originally planned SNF, but patient does not want to go to SNF  He will not stay hospitalized any longer. Was able to get up and shower without assistance. Safe enough to discharge. Assures me he will take antibiotics.   Patient has an active case with adult protective services due to lack of self care. May leave AMA, but after negotiation was able to discharge with plan to take antibiotics as directed  Informed True from [facility] about situation. He will follow up and offer support. [facility] will follow up as well.      Pneumonia  LLL opacity. Given lack of improvement, considered aspiration. Evaluation by SLP without aspiration signs  Should have been covered with antibiotics from admission, Levaquin on discharge  Albuterol nebs as needed      Complicated UTI (urinary tract infection) due to catheterization  Urine culture with resistant staph epi. Treat with Levaquin 750 mg daily x 6 days  Continue self cath BID    Active Problems:    Hypomagnesemia  Chronic on home replacement  1.1 in ED  Likely due to alcohol and poor  nutrition  Replaced with IV until patient refused. Believes magnesium is causing bruising  1.3 on discharge      Hypophosphatemia  Likely chronic, seen last hospitalization  1.9 in ED, due to alcohol and poor nutrition  Replaced, normal at 2.9 on discharge      Moderate protein calorie malnutrition  Prealbumin 16.9    Chronic obstructive pulmonary disease, unspecified COPD type (H)  Not on inhalers  Continues to smoke but declined a nicotine patch  as he got a rash the last time       Alcohol dependence with unspecified alcohol-induced disorder (H)  Regular alcohol use  CIWA protocol low scores, no withdrawal. Discontinue protocol       BPH with urinary retention  Self cath has only been doing daily and should be BID.  Continue to follow with urology        Consultations This Hospital Stay   SOCIAL WORK IP CONSULT  OCCUPATIONAL THERAPY ADULT IP CONSULT  PHYSICAL THERAPY ADULT IP CONSULT  SOCIAL WORK IP CONSULT  SPEECH LANGUAGE PATH ADULT IP CONSULT    Code Status   No CPR- Do NOT Intubate    Time Spent on this Encounter   I, Felix Lambert MD, personally saw the patient today and spent greater than 55 minutes discharging this patient.       Felix Lambert MD  [facility]  [address]  [place] [address]  Phone: [phone]  Fax: [phone]  ______________________________________________________________________    Physical Exam   Vital Signs: Temp: 98.7  F (37.1  C) Temp src: Tympanic BP: 130/69 Pulse: 52   Resp: 18 SpO2: 93 % O2 Device: None (Room air)    Weight: 144 lbs 4.8 oz  General Appearance: Alert. No acute distress  Chest/Respiratory Exam: crackles left base. No wheezing  Cardiovascular Exam: Regular rate and rhythm. S1, S2, no murmur, gallop, or rubs.  Gastrointestinal Exam: Soft, nontender  Extremities: No lower extremity edema.  Psychiatric: Normal affect and mentation       Primary Care Physician   Soledad Person    Discharge Orders      Reason for your  hospital stay    Sepsis from pneumonia and UTI     Follow-up and recommended labs and tests     Follow up with primary care provider, Soledad Person, within 7 days for hospital follow- up.  The following labs/tests are recommended: BMP, magnesium.     Activity    Your activity upon discharge: activity as tolerated     Diet    Follow this diet upon discharge: regular diet       Significant Results and Procedures   Most Recent 3 CBC's:  Recent Labs   Lab Test 05/03/25  0620 05/02/25  0544 05/01/25  0534   WBC 9.7 9.9 11.9*   HGB 11.3* 11.0* 12.1*   * 103* 103*   PLT 97* 89* 89*     Most Recent 3 BMP's:  Recent Labs   Lab Test 05/03/25  0620 05/02/25  2118 05/02/25  1303 05/02/25  0544 05/01/25  1201 05/01/25  0534   *  --   --  134*  --  136   POTASSIUM 3.2* 3.9 3.3* 3.2*   < > 3.1*   CHLORIDE 101  --   --  101  --  100   CO2 23  --   --  23  --  24   BUN 6.7*  --   --  9.2  --  10.9   CR 0.49*  --  0.51* 0.52*  --  0.59*   ANIONGAP 10  --   --  10  --  12   PRESTON 7.9*  --   --  8.0*  --  8.3*   GLC 96  --   --  110*  --  101*    < > = values in this interval not displayed.     Most Recent 2 LFT's:  Recent Labs   Lab Test 04/30/25  1507 03/21/25  1629   AST 83* 188*   ALT 41 125*   ALKPHOS 123 91   BILITOTAL 2.0* 2.9*   ,   Results for orders placed or performed during the hospital encounter of 04/30/25   XR Chest Port 1 View    Narrative    PROCEDURE:  XR CHEST PORT 1 VIEW    HISTORY:  Fever.     COMPARISON:  None.    FINDINGS:   The cardiac silhouette is normal in size. The pulmonary vasculature is  normal.  There is a perihilar opacity in the left lung. This was not  seen in 2021. This opacity is suspicious for pneumonia but careful  follow-up to clearing is recommended. Emphysematous changes are noted  in both lungs. There is apical pleural thickening seen on the right  which is stable from previous examination No pleural effusion or  pneumothorax.      Impression    IMPRESSION:  Left perihilar opacity  suspicious for pneumonia      MARIANA SUMMERS MD         SYSTEM ID:  [id number]   CT Head w/o Contrast    Narrative    PROCEDURE: CT HEAD W/O CONTRAST     HISTORY: fall, etoh.    COMPARISON: 3/21/2025    TECHNIQUE:  Helical images of the head from the foramen magnum to the  vertex were obtained without contrast.    FINDINGS: The ventricles and sulci are normal in volume. No acute  intracranial hemorrhage, mass effect, midline shift, hydrocephalus or  basilar cystern effacement are present.    The grey-white matter interface is preserved.    The calvarium is intact. The mastoid air cells are clear.  The  visualized paranasal sinuses are clear.      Impression    IMPRESSION: Normal brain for age      MARIANA SUMMERS MD         SYSTEM ID:  [id number]   CT Cervical Spine w/o Contrast    Narrative    PROCEDURE: CT CERVICAL SPINE W/O CONTRAST 4/30/2025 4:10 PM    HISTORY: fall    COMPARISONS: None.    Meds/Dose Given:    TECHNIQUE: CT scan of the cervical spine with sagittal and coronal  reconstructions.    FINDINGS: There is degenerative change at the middle atlantoaxial  joint. There is decrease in height in the C5-C6 and C6-C7 discs. There  is mild forward subluxation of C4 on C5 and  C7 on T1 due to facet  joint degenerative changes. There are no fractures of the vertebral  bodies or arches. Advanced facet joint degenerative changes are noted  worse on the left right. Calcific plaquing at the carotid  bifurcations. The prevertebral soft tissues are otherwise normal.         Impression    IMPRESSION: Advanced degenerative changes of the cervical spine no  acute fractures.    MARIANA SUMMERS MD         SYSTEM ID:  [id number]   XR Pelvis w Hip Port Left 1 View    Narrative    PROCEDURE: XR PELVIS AND HIP PORTABLE LEFT 1 VIEW 4/30/2025 3:30 PM    HISTORY: fall, left hip pain    COMPARISONS: None.    TECHNIQUE: Pelvis one view, left hip one view    FINDINGS: The pelvis is intact. The sacrum and sacroiliac joints  appear  normal. Degenerative changes are present in the lower lumbar  spine. The right proximal femur is intact.    Left hip: There is no left hip fracture or destructive lesion.  Articular spaces are normal in height at the left hip.         Impression    IMPRESSION: No pelvic or left hip fracture.    MARIANA SUMMERS MD         SYSTEM ID:  [id number]       Discharge Medications   Current Discharge Medication List        START taking these medications    Details   levofloxacin (LEVAQUIN) 750 MG tablet Take 1 tablet (750 mg) by mouth daily for 5 days.  Qty: 5 tablet, Refills: 0    Associated Diagnoses: Complicated UTI (urinary tract infection); Severe sepsis with acute organ dysfunction (H)           CONTINUE these medications which have NOT CHANGED    Details   atorvastatin (LIPITOR) 20 MG tablet Take 20 mg by mouth daily.      finasteride (PROSCAR) 5 MG tablet Take 5 mg by mouth daily.      magnesium oxide (MAG-OX) 400 MG tablet Take 1 tablet (400 mg) by mouth 2 times daily.  Qty: 60 tablet, Refills: 0    Comments: Rosa Cares Please  Associated Diagnoses: Severe sepsis with acute organ dysfunction (H); Acute cystitis with hematuria; Mixed hyperlipidemia; Benign localized prostatic hyperplasia with lower urinary tract symptoms (LUTS); Malignant neoplasm of urinary bladder, unspecified site (H)      omeprazole (PRILOSEC) 20 MG DR capsule Take 1 capsule (20 mg) by mouth daily  Qty: 90 capsule, Refills: 3    Associated Diagnoses: Hou's esophageal ulceration      tamsulosin (FLOMAX) 0.4 MG capsule Take 0.8 mg by mouth daily.           Allergies   No Known Allergies

## 2025-05-03 NOTE — PROGRESS NOTES
At MD request contacted [facility]-to inquire of any interventions they could provide to assist pt requesting to discharge back to his motel room. S/W Nathan who indicated if pt was to discharge they would not be able to provide any assistance to patient or [facility].  True w/[facility] arrived to discuss with pt the possibility of remaining inpt. Pt continues to request discharge.

## 2025-05-03 NOTE — PLAN OF CARE
"Goal Outcome Evaluation: Patients vss. /62 (BP Location: Left arm, Patient Position: Supine, Cuff Size: Adult Small)   Pulse 62   Temp 100.2  F (37.9  C) (Tympanic)   Resp 16   Ht 1.93 m (6' 4\")   Wt 64.4 kg (141 lb 14.4 oz)   SpO2 92%   BMI 17.27 kg/m   complaints of mild chronic pain to lower back. Patient straight cathed around 2100 for 800ml's humberto clear urine. Patient is an assist of 1  with gait belt and walker for ambulation, patient has an unsteady wobbly gait. Patient is having small amounts of loose stools. Patient stated \"That doctor is a sadistic bastard, he knows the antibiotics are giving me diarrhea and he still puts me on them\" Educated patient that the antibiotics were for the infection he has, and that he is also on a probiotic. Patient stated \"I'm going to request to go home tomorrow, I'm sick of all of this\" Asked patient if he brought himself to the hospital, patient stated \"well I couldn't stand up so I had no choice\" Asked patient if he thought he would be safe to leave, as he still is not very steady, patient stated \"Well, I'll either live or die\"                       "

## 2025-05-03 NOTE — PROGRESS NOTES
"Pt has stated he is refusing all IV and oral medications. \"These meds are the problem and making things worse. RN encouraged medication to be taken and benefits, however he is still refusing. Charge and Dr. Farfan notified.  "

## 2025-05-04 LAB
BACTERIA SPT CULT: ABNORMAL
GRAM STAIN RESULT: ABNORMAL

## 2025-05-05 ENCOUNTER — PATIENT OUTREACH (OUTPATIENT)
Dept: FAMILY MEDICINE | Facility: OTHER | Age: 75
End: 2025-05-05
Payer: MEDICARE

## 2025-05-05 LAB
BACTERIA BLD CULT: NO GROWTH
BACTERIA BLD CULT: NO GROWTH

## 2025-05-05 NOTE — TELEPHONE ENCOUNTER
Transitions of Care Outreach  Chief Complaint   Patient presents with    Hospital F/U       Most Recent Admission Date: 4/30/2025   Most Recent Admission Diagnosis: Hypomagnesemia - E83.42  Hypophosphatemia - E83.39  Pneumonia - J18.9  Generalized weakness - R53.1  Falls frequently - R29.6  Complicated UTI (urinary tract infection) - N39.0  Sepsis (H) - A41.9     Most Recent Discharge Date: 5/3/2025   Most Recent Discharge Diagnosis: Generalized weakness - R53.1  Sepsis (H) - A41.9  Hypomagnesemia - E83.42  Hypophosphatemia - E83.39  Falls frequently - R29.6  Complicated UTI (urinary tract infection) - N39.0  Pneumonia - J18.9  Severe sepsis with acute organ dysfunction (H) - A41.9, R65.20     Transitions of Care Assessment    Discharge Assessment  How are you doing now that you are home?: Not very good.  How are your symptoms? (Red Flag symptoms escalate to triage hotline per guidelines): Unchanged  Do you know how to contact your clinic care team if you have future questions or changes to your health status? : Yes  Does the patient have their discharge instructions? : Yes  Does the patient have questions regarding their discharge instructions? : No  Were you started on any new medications or were there changes to any of your previous medications? : Yes  Does the patient have all of their medications?: Yes  Do you have questions regarding any of your medications? : No  Do you have all of your needed medical supplies or equipment (DME)?  (i.e. oxygen tank, CPAP, cane, etc.): Yes    Follow up Plan     Discharge Follow-Up  Discharge follow up appointment scheduled in alignment with recommended follow up timeframe or Transitions of Risk Category? (Low = within 30 days; Moderate= within 14 days; High= within 7 days): Yes  Discharge Follow Up Appointment Date: 05/13/25  Discharge Follow Up Appointment Scheduled with?: Primary Care Provider    Future Appointments   Date Time Provider Department Center   5/13/2025  1:15 PM  Soledad Person APRN CNP [facility]       Outpatient Plan as outlined on AVS reviewed with patient.    For any urgent concerns, please contact our 24 hour nurse triage line: [phone] ([phone])       Fay Barksdale RN

## 2025-05-05 NOTE — PROGRESS NOTES
:     Received a phone call from STEPHAN Mesa [facility]. She stated that patient was active with [facility]. She was wondering if a new home care referral has been ordered. At this time, there was no home care ordered.     JODY Clay on 5/5/2025 at 10:39 AM

## 2025-05-07 ENCOUNTER — TELEPHONE (OUTPATIENT)
Dept: UROLOGY | Facility: OTHER | Age: 75
End: 2025-05-07
Payer: MEDICARE

## 2025-05-07 NOTE — TELEPHONE ENCOUNTER
Received an [facility] from the [facility] for [facility]. I contacted [facility] to schedule. Patient states he is moving to [place] the middle of May to a home over there.  He declined to schedule at [facility]. Patient states he will contact the [facility] for [facility] referral to [place].  Patient declined to schedule at [facility].    Jordyn Schulte on 5/7/2025 at 3:30 PM

## 2025-05-13 ENCOUNTER — OFFICE VISIT (OUTPATIENT)
Dept: FAMILY MEDICINE | Facility: OTHER | Age: 75
End: 2025-05-13
Attending: NURSE PRACTITIONER
Payer: MEDICARE

## 2025-05-13 ENCOUNTER — RESULTS FOLLOW-UP (OUTPATIENT)
Dept: FAMILY MEDICINE | Facility: OTHER | Age: 75
End: 2025-05-13

## 2025-05-13 VITALS
TEMPERATURE: 97.4 F | SYSTOLIC BLOOD PRESSURE: 112 MMHG | HEIGHT: 76 IN | BODY MASS INDEX: 17.27 KG/M2 | HEART RATE: 92 BPM | RESPIRATION RATE: 20 BRPM | DIASTOLIC BLOOD PRESSURE: 64 MMHG | WEIGHT: 141.8 LBS | OXYGEN SATURATION: 97 %

## 2025-05-13 DIAGNOSIS — R53.1 GENERALIZED WEAKNESS: ICD-10-CM

## 2025-05-13 DIAGNOSIS — E83.42 HYPOMAGNESEMIA: ICD-10-CM

## 2025-05-13 DIAGNOSIS — J44.9 CHRONIC OBSTRUCTIVE PULMONARY DISEASE, UNSPECIFIED COPD TYPE (H): Primary | ICD-10-CM

## 2025-05-13 DIAGNOSIS — R29.6 FALLS FREQUENTLY: ICD-10-CM

## 2025-05-13 DIAGNOSIS — E78.2 MIXED HYPERLIPIDEMIA: ICD-10-CM

## 2025-05-13 DIAGNOSIS — E87.6 HYPOKALEMIA: ICD-10-CM

## 2025-05-13 DIAGNOSIS — N40.1 BENIGN LOCALIZED PROSTATIC HYPERPLASIA WITH LOWER URINARY TRACT SYMPTOMS (LUTS): ICD-10-CM

## 2025-05-13 LAB
ANION GAP SERPL CALCULATED.3IONS-SCNC: 10 MMOL/L (ref 7–15)
BUN SERPL-MCNC: 11.3 MG/DL (ref 8–23)
CALCIUM SERPL-MCNC: 8.8 MG/DL (ref 8.8–10.4)
CHLORIDE SERPL-SCNC: 92 MMOL/L (ref 98–107)
CREAT SERPL-MCNC: 0.72 MG/DL (ref 0.67–1.17)
EGFRCR SERPLBLD CKD-EPI 2021: >90 ML/MIN/1.73M2
GLUCOSE SERPL-MCNC: 109 MG/DL (ref 70–99)
HCO3 SERPL-SCNC: 36 MMOL/L (ref 22–29)
MAGNESIUM SERPL-MCNC: 1.1 MG/DL (ref 1.7–2.3)
POTASSIUM SERPL-SCNC: 3.2 MMOL/L (ref 3.4–5.3)
SODIUM SERPL-SCNC: 138 MMOL/L (ref 135–145)

## 2025-05-13 PROCEDURE — 82565 ASSAY OF CREATININE: CPT | Mod: ZL | Performed by: NURSE PRACTITIONER

## 2025-05-13 PROCEDURE — G0463 HOSPITAL OUTPT CLINIC VISIT: HCPCS

## 2025-05-13 PROCEDURE — 99495 TRANSJ CARE MGMT MOD F2F 14D: CPT | Performed by: NURSE PRACTITIONER

## 2025-05-13 PROCEDURE — 36415 COLL VENOUS BLD VENIPUNCTURE: CPT | Mod: ZL | Performed by: NURSE PRACTITIONER

## 2025-05-13 PROCEDURE — 83735 ASSAY OF MAGNESIUM: CPT | Mod: ZL | Performed by: NURSE PRACTITIONER

## 2025-05-13 ASSESSMENT — PATIENT HEALTH QUESTIONNAIRE - PHQ9
10. IF YOU CHECKED OFF ANY PROBLEMS, HOW DIFFICULT HAVE THESE PROBLEMS MADE IT FOR YOU TO DO YOUR WORK, TAKE CARE OF THINGS AT HOME, OR GET ALONG WITH OTHER PEOPLE: SOMEWHAT DIFFICULT
SUM OF ALL RESPONSES TO PHQ QUESTIONS 1-9: 13
SUM OF ALL RESPONSES TO PHQ QUESTIONS 1-9: 13

## 2025-05-13 ASSESSMENT — PAIN SCALES - GENERAL: PAINLEVEL_OUTOF10: NO PAIN (0)

## 2025-05-13 NOTE — NURSING NOTE
Patient presents today for hospital follow up.    Medication Reconciliation Complete    Emely Melvin LPN  5/13/2025 12:46 PM

## 2025-05-13 NOTE — PROGRESS NOTES
Assessment & Plan   Problem List Items Addressed This Visit          Respiratory    Chronic obstructive pulmonary disease, unspecified COPD type (H) - Primary       Endocrine    Mixed hyperlipidemia    Hypomagnesemia    Relevant Orders    Magnesium    Basic Metabolic Panel    Hypokalemia    Relevant Orders    Basic Metabolic Panel       Urinary    Benign localized prostatic hyperplasia with lower urinary tract symptoms (LUTS)       Other    Generalized weakness    Relevant Orders    Basic Metabolic Panel    Falls frequently    Relevant Orders    Basic Metabolic Panel      Does continue to smoke, has COPD, reports he has an inhaler at home that he uses as needed.  He does not know the name of this, needs refills but [facility].  Medication reconciliation completed today.  BMP magnesium order updated, will call with results.  Paperwork completed for admission to [facility] for rehab.  They will let me know if they need anything further at this time.    The longitudinal plan of care for the diagnosis(es)/condition(s) as documented were addressed during this visit. Due to the added complexity in care, I will continue to support Star in the subsequent management and with ongoing continuity of care.     MED REC REQUIRED  Post Medication Reconciliation Status: discharge medications reconciled and changed, per note/orders  Nicotine/Tobacco Cessation  He reports that he has been smoking cigarettes. He started smoking about 53 years ago. He has a 53.4 pack-year smoking history. He has never used smokeless tobacco.  Nicotine/Tobacco Cessation Plan  Information offered: Patient not interested at this time      Depression Screening Follow Up        5/13/2025    12:43 PM   PHQ   PHQ-9 Total Score 13    Q9: Thoughts of better off dead/self-harm past 2 weeks More than half the days   F/U: Thoughts of suicide or self-harm No   F/U: Safety concerns Yes       Patient-reported     Follow Up Actions Taken  Crisis  resource information provided in the After Visit Summary  Working with crisis response, systems in place for help both physically and mentally  Discussed the following ways the patient can remain in a safe environment:  be around others      Oneil Graves is a 74 year old, presenting for the following health issues:  Hospital F/U    History of Present Illness       Reason for visit:  Follow up    He eats 0-1 servings of fruits and vegetables daily.He consumes 2 sweetened beverage(s) daily.He exercises with enough effort to increase his heart rate 9 or less minutes per day.  He exercises with enough effort to increase his heart rate 3 or less days per week.   He is taking medications regularly.            5/5/2025   Post Discharge Outreach   How are you doing now that you are home? Not very good.   How are your symptoms? (Red Flag symptoms escalate to triage hotline per guidelines) Unchanged   Does the patient have their discharge instructions?  Yes   Does the patient have questions regarding their discharge instructions?  No   Were you started on any new medications or were there changes to any of your previous medications?  Yes   Does the patient have all of their medications? Yes   Do you have questions regarding any of your medications?  No   Do you have all of your needed medical supplies or equipment (DME)?  (i.e. oxygen tank, CPAP, cane, etc.) Yes   Discharge Follow Up Appointment Date 5/13/2025   Discharge Follow Up Appointment Scheduled with? Primary Care Provider       Hospital Follow-up Visit:    Hospital/Nursing Home/IP Rehab Facility: [facility]  Most Recent Admission Date: 4/30/2025   Most Recent Admission Diagnosis: Hypomagnesemia - E83.42  Hypophosphatemia - E83.39  Pneumonia - J18.9  Generalized weakness - R53.1  Falls frequently - R29.6  Complicated UTI (urinary tract infection) - N39.0  Sepsis (H) - A41.9     Most Recent Discharge Date: 5/3/2025   Most Recent Discharge  Diagnosis: Generalized weakness - R53.1  Sepsis (H) - A41.9  Hypomagnesemia - E83.42  Hypophosphatemia - E83.39  Falls frequently - R29.6  Complicated UTI (urinary tract infection) - N39.0  Pneumonia - J18.9  Severe sepsis with acute organ dysfunction (H) - A41.9, R65.20   Was the patient in the ICU or did the patient experience delirium during hospitalization?  No  Do you have any other stressors you would like to discuss with your provider? No    Problems taking medications regularly:  None  Medication changes since discharge: None  Problems adhering to non-medication therapy:  None    Summary of hospitalization:  [facility] hospital discharge summary reviewed  Diagnostic Tests/Treatments reviewed.  Follow up needed: magnesium/BMP  Other Healthcare Providers Involved in Patient s Care:         Physical Therapy  Update since discharge: improved.         Plan of care communicated with patient and other healthcare provider     He presents to clinic today for hospital follow-up.  He is accompanied by True who works with [facility]/crisis response.  Star reports that because he left [place] he was unable to go to [facility] for rehab.  They are willing to accept him, has paperwork for me to complete today.  He does report that he continues to have his balance off, unable to walk short distances, legs are wobbly.  He reports he has to tell his left leg what to do in order for it to [place] with devices.  He is [facility].  He is aware that he needs to have blood work completed today, states if he has low magnesium, he is not interested in taking this because of the side effects he has.  He continues to self cath 1-2 times a day.  Has not been taking Flomax as prescribed, reports if he takes it at night then he wakes up a couple times during the night feeling like he has to go to the bathroom.  He has not tried to take this during the day.  Medications are prescribed through  "[facility] administration.          Objective    /64   Pulse 92   Temp 97.4  F (36.3  C)   Resp 20   Ht 1.93 m (6' 4\")   Wt 64.3 kg (141 lb 12.8 oz)   SpO2 97%   BMI 17.26 kg/m    Body mass index is 17.26 kg/m .  Physical Exam   GENERAL: alert and no distress  EYES: Eyes grossly normal to inspection  RESP: lungs clear to auscultation - no rales, rhonchi or wheezes, occasional wet cough, O2 sats 97% on room air, no respiratory distress  CV: regular rate and rhythm, normal S1 S2  NEURO: Normal strength and tone, mentation intact and speech normal  PSYCH: mentation appears normal, affect normal/bright            Signed Electronically by: TIMOTEO Seals CNP    "

## 2025-05-21 VITALS
TEMPERATURE: 97.7 F | WEIGHT: 146 LBS | RESPIRATION RATE: 16 BRPM | BODY MASS INDEX: 17.78 KG/M2 | HEART RATE: 76 BPM | HEIGHT: 76 IN | OXYGEN SATURATION: 96 % | DIASTOLIC BLOOD PRESSURE: 70 MMHG | SYSTOLIC BLOOD PRESSURE: 128 MMHG

## 2025-05-21 PROBLEM — Z60.2 PROBLEMS RELATED TO LIVING ALONE: Status: ACTIVE | Noted: 2025-05-21

## 2025-05-21 PROBLEM — M62.81 MUSCLE WEAKNESS (GENERALIZED): Status: ACTIVE | Noted: 2025-05-21

## 2025-05-21 PROBLEM — R55 SYNCOPE AND COLLAPSE: Status: ACTIVE | Noted: 2025-05-21

## 2025-05-21 PROBLEM — F10.10 HARMFUL USE OF ALCOHOL: Status: ACTIVE | Noted: 2025-05-21

## 2025-05-21 PROBLEM — Z87.440 PERSONAL HISTORY OF URINARY (TRACT) INFECTIONS: Status: ACTIVE | Noted: 2025-05-21

## 2025-05-21 PROBLEM — R93.41 ABNORMAL RADIOLOGIC FINDINGS ON DIAGNOSTIC IMAGING OF RENAL PELVIS, URETER, OR BLADDER: Status: ACTIVE | Noted: 2025-05-21

## 2025-05-21 PROBLEM — Z74.1 NEED FOR ASSISTANCE WITH PERSONAL CARE: Status: ACTIVE | Noted: 2025-05-21

## 2025-05-22 ENCOUNTER — NURSING HOME VISIT (OUTPATIENT)
Dept: GERIATRICS | Facility: OTHER | Age: 75
End: 2025-05-22

## 2025-05-22 DIAGNOSIS — R53.1 GENERALIZED WEAKNESS: ICD-10-CM

## 2025-05-22 DIAGNOSIS — F10.29 ALCOHOL DEPENDENCE WITH UNSPECIFIED ALCOHOL-INDUCED DISORDER (H): ICD-10-CM

## 2025-05-22 DIAGNOSIS — E78.2 MIXED HYPERLIPIDEMIA: ICD-10-CM

## 2025-05-22 DIAGNOSIS — E83.42 HYPOMAGNESEMIA: ICD-10-CM

## 2025-05-22 DIAGNOSIS — N40.1 BENIGN LOCALIZED PROSTATIC HYPERPLASIA WITH LOWER URINARY TRACT SYMPTOMS (LUTS): ICD-10-CM

## 2025-05-22 DIAGNOSIS — Z09 HOSPITAL DISCHARGE FOLLOW-UP: Primary | ICD-10-CM

## 2025-05-22 DIAGNOSIS — J18.9 PNEUMONIA DUE TO INFECTIOUS ORGANISM, UNSPECIFIED LATERALITY, UNSPECIFIED PART OF LUNG: ICD-10-CM

## 2025-05-22 DIAGNOSIS — R29.6 RECURRENT FALLS: ICD-10-CM

## 2025-05-22 DIAGNOSIS — C67.9 MALIGNANT NEOPLASM OF URINARY BLADDER, UNSPECIFIED SITE (H): ICD-10-CM

## 2025-05-22 DIAGNOSIS — Z87.440 PERSONAL HISTORY OF URINARY TRACT INFECTION: ICD-10-CM

## 2025-05-22 NOTE — PROGRESS NOTES
HISTORY OF PRESENT ILLNESS:      Avinash is a 74 year old male (1950)  resident of [facility] who is being seen today for an initial visit and hospital follow-up.     Patient has a history of bladder cancer, COPD, alcohol dependence, recurrent falls.     Patient was residing at a hotel in [place] prior hospitalization.    Patient was admitted to this facility for rehabilitation following hospitalization at [facility].    Patient hospitalized 4/30-5/3 at [facility], presenting with recurrent falls. Found to be septic from UTI, pneumonia. He was initially treated with ceftriaxone and doxycycline, changed to unasyn. Urine culture with staph epi resistant to oxacillin and penicillin. He was started on levaquin. He was discharged to home, left AMA. He followed up in the clinic on 5/13 and was set up for admission at [facility].    Discussed with nursing staff who have the following concerns: None. Care conference this afternoon. Unclear plan at discharge.     Patient is seen in their room. Family is not present. Notes he is feeing fine, better each day. Denies any concerns today, but states his medications for his bladder were messed up. This is now fixed. Breathing feels better. He is coughing up less and less. Denies shortness of breath at rest. No fevers, chills. He tells me he does not want any nicotine patches or lozenges as these products have resulted in a rash in the past. He tells me he will quit smoking regardless when he leaves here. He is trying to cut back however.     He has been catheterizing twice daily. Denies any hematuria.     Also tells me he was drinking about 1.75 L of bourban every 2-3 days. Has been in treatment 6 times. He really desires to quit. He states that if he keeps busy he won't drink but otherwise starts again.      He does not have any family or friends. He says everyone he worked with has passed away.    He states he has been doing well with PT, less wobbly.  He did weights with upper body today.    Feels his appetite is much better.    [facility] crisis response following.    Current medications, allergies, and interdisciplinary care plan are reviewed.    Patient Active Problem List    Diagnosis Date Noted    Abnormal radiologic findings on diagnostic imaging of renal pelvis, ureter, or bladder 05/21/2025     Priority: Medium    Harmful use of alcohol 05/21/2025     Priority: Medium    Muscle weakness (generalized) 05/21/2025     Priority: Medium    Need for assistance with personal care 05/21/2025     Priority: Medium    Personal history of urinary (tract) infections 05/21/2025     Priority: Medium    Problems related to living alone 05/21/2025     Priority: Medium    Syncope and collapse 05/21/2025     Priority: Medium    Generalized weakness 04/30/2025     Priority: Medium    Falls frequently 04/30/2025     Priority: Medium    Sepsis without acute organ dysfunction (H) 04/30/2025     Priority: Medium    Pneumonia 04/30/2025     Priority: Medium    Complicated UTI (urinary tract infection) 04/30/2025     Priority: Medium    Hypophosphatemia 03/25/2025     Priority: Medium    Acute cystitis with hematuria 03/22/2025     Priority: Medium    Hypokalemia 03/22/2025     Priority: Medium    Hypomagnesemia 03/21/2025     Priority: Medium    Severe sepsis with acute organ dysfunction (H) - hyperlactatemia due to UTI 03/21/2025     Priority: Medium    Chronic obstructive pulmonary disease, unspecified COPD type (H) 06/14/2024     Priority: Medium    Alcohol dependence with unspecified alcohol-induced disorder (H) 06/14/2024     Priority: Medium    Abnormal liver enzymes 06/14/2024     Priority: Medium    Asymptomatic varicose veins 06/14/2024     Priority: Medium    Low back pain 06/14/2024     Priority: Medium    Seborrheic dermatitis, unspecified 06/14/2024     Priority: Medium    Multiple pulmonary nodules 06/14/2024     Priority: Medium     Jan 07, 2019 Entered By: ISABELA CHAPPELL  "Comment: Lung nodule tracking begun 10/10/2017.      Osteoarthrosis 06/14/2024     Priority: Medium     Oct 16, 2012 Entered By: KHADIJAH BULLOCK Comment: Neck, shoulders, right wrist and bilateral hands      Other psoriasis 06/14/2024     Priority: Medium    Other retention of urine 06/14/2024     Priority: Medium    Personal history of malignant neoplasm of bladder 06/14/2024     Priority: Medium    Mixed hyperlipidemia 06/14/2024     Priority: Medium    Convulsions, unspecified convulsion type (H) 06/13/2024     Priority: Medium    Benign localized prostatic hyperplasia with lower urinary tract symptoms (LUTS) 06/13/2024     Priority: Medium    Malignant neoplasm of urinary bladder (H) 03/09/2023     Priority: Medium    Restless leg 12/14/2021     Priority: Medium    Venous insufficiency 01/11/2014     Priority: Medium    Other viral warts 05/18/2007     Priority: Medium          Social History     Socioeconomic History    Marital status:      Spouse name: Not on file    Number of children: Not on file    Years of education: Not on file    Highest education level: Not on file   Occupational History    Not on file   Tobacco Use    Smoking status: Every Day     Current packs/day: 1.00     Average packs/day: 1 pack/day for 53.4 years (53.4 ttl pk-yrs)     Types: Cigarettes     Start date: 1972    Smokeless tobacco: Never   Vaping Use    Vaping status: Never Used   Substance and Sexual Activity    Alcohol use: Yes    Drug use: Not Currently     Comment: Former: \"POT and anything I could get my hands on.  I would stick things in my arms.\"    Sexual activity: Not Currently   Other Topics Concern    Not on file   Social History Narrative    Preloaded 1/31/2013.     Social Drivers of Health     Financial Resource Strain: Low Risk  (4/30/2025)    Financial Resource Strain     Within the past 12 months, have you or your family members you live with been unable to get utilities (heat, electricity) when it was really " needed?: No   Food Insecurity: Low Risk  (4/30/2025)    Food Insecurity     Within the past 12 months, did you worry that your food would run out before you got money to buy more?: No     Within the past 12 months, did the food you bought just not last and you didn t have money to get more?: No   Transportation Needs: High Risk (4/30/2025)    Transportation Needs     Within the past 12 months, has lack of transportation kept you from medical appointments, getting your medicines, non-medical meetings or appointments, work, or from getting things that you need?: Yes   Physical Activity: Unknown (6/13/2024)    Exercise Vital Sign     Days of Exercise per Week: 2 days     Minutes of Exercise per Session: Not on file   Stress: No Stress Concern Present (6/13/2024)    [place] [employer] - Occupational Stress Questionnaire     Feeling of Stress : Not at all   Social Connections: Unknown (6/13/2024)    Social Connection and Isolation Panel [NHANES]     Frequency of Communication with Friends and Family: Not on file     Frequency of Social Gatherings with Friends and Family: Once a week     Attends Christianity Services: Not on file     Active Member of Clubs or Organizations: Not on file     Attends Club or Organization Meetings: Not on file     Marital Status: Not on file   Interpersonal Safety: Low Risk  (4/30/2025)    Interpersonal Safety     Do you feel physically and emotionally safe where you currently live?: Yes     Within the past 12 months, have you been hit, slapped, kicked or otherwise physically hurt by someone?: No     Within the past 12 months, have you been humiliated or emotionally abused in other ways by your partner or ex-partner?: No   Housing Stability: High Risk (4/30/2025)    Housing Stability     Do you have housing? : Yes     Are you worried about losing your housing?: Yes        Current Outpatient Medications   Medication Sig Dispense Refill    atorvastatin (LIPITOR) 20 MG tablet  "Take 20 mg by mouth daily.      finasteride (PROSCAR) 5 MG tablet Take 5 mg by mouth daily.      omeprazole (PRILOSEC) 20 MG DR capsule Take 1 capsule (20 mg) by mouth daily 90 capsule 3    tamsulosin (FLOMAX) 0.4 MG capsule Take 0.8 mg by mouth daily.       No current facility-administered medications for this visit.       No Known Allergies    I have reviewed the care plan and do agree with the plan.    ROS:  No chest pain, shortness of breath, fever, chills, headache, nausea, vomiting, dysuria, or changes in bowel habits.  Appetite is good.  NO pain noted.    OBJECTIVE:  /70   Pulse 76   Temp 97.7  F (36.5  C)   Resp 16   Ht 1.93 m (6' 4\")   Wt 66.2 kg (146 lb)   SpO2 96%   BMI 17.77 kg/m      GENERAL:  Alert, and in no acute distress  RESP:  Lungs clear.  No rales, rhonchi, or wheezing  CV:  RRR.  S1 S2 with NO murmur. No clicks or rubs.  ABD: Soft, non tender, non distended, no organomegaly. BS are normal.   SKIN:  Age-related changes.  No suspicious lesions or rashes.  PSYCH:  Affect is bright.  NEURO: Mental status is alert. Memory is intact.   EXTREM:  No edema.  Pulses palpable.    Lab/Diagnostic data:    Reviewed in Epic    ASSESSMENT/ORDERS:  Star was seen today for initial visit.    Diagnoses and all orders for this visit:    Hospital discharge follow-up/Personal history of urinary tract infection  With hospitalization 4/30-5/4. Discharged on levaquin. Monitor.    Pneumonia due to infectious organism, unspecified laterality, unspecified part of lung  Breathing stable, improved. No hypoxia. Monitor.    Benign localized prostatic hyperplasia with lower urinary tract symptoms (LUTS)  Now taking flomax in the morning again which has been helpful. Continue proscar. No urinary concerns today. Patient is self catheterizing 1-2 times daily. Urology referral placed.    Alcohol dependence with unspecified alcohol-induced disorder (H)  PTA drinking approximately 1.75 L of bourban every 2-3 days. Social " services working on discharge planning. [facility] crisis response following. Doing well in supportive, controlled environment.     Generalized weakness/Recurrent falls  No recent falls. Continue Pt/OT, appreciate recommendations.    Mixed hyperlipidemia  Continue statin.    Hypomagnesemia  Remains low. Patient refuses supplementation.    Total time spent with patient visit was 45 min including patient visit, review of pertinent clinical information, and treatment plan.    Shivani Qureshi, CNP

## 2025-05-28 ENCOUNTER — TELEPHONE (OUTPATIENT)
Dept: GERIATRICS | Facility: OTHER | Age: 75
End: 2025-05-28

## 2025-06-18 ENCOUNTER — TRANSFERRED RECORDS (OUTPATIENT)
Dept: HEALTH INFORMATION MANAGEMENT | Facility: CLINIC | Age: 75
End: 2025-06-18

## 2025-07-07 VITALS
SYSTOLIC BLOOD PRESSURE: 123 MMHG | WEIGHT: 162 LBS | TEMPERATURE: 98.9 F | OXYGEN SATURATION: 95 % | BODY MASS INDEX: 19.72 KG/M2 | DIASTOLIC BLOOD PRESSURE: 62 MMHG | HEART RATE: 72 BPM

## 2025-07-10 ENCOUNTER — NURSING HOME VISIT (OUTPATIENT)
Dept: GERIATRICS | Facility: OTHER | Age: 75
End: 2025-07-10
Payer: MEDICARE

## 2025-07-10 DIAGNOSIS — R29.6 RECURRENT FALLS: ICD-10-CM

## 2025-07-10 DIAGNOSIS — F10.29 ALCOHOL DEPENDENCE WITH UNSPECIFIED ALCOHOL-INDUCED DISORDER (H): ICD-10-CM

## 2025-07-10 DIAGNOSIS — E83.42 HYPOMAGNESEMIA: ICD-10-CM

## 2025-07-10 DIAGNOSIS — Z78.9 NURSING HOME RESIDENT: Primary | ICD-10-CM

## 2025-07-10 DIAGNOSIS — E78.2 MIXED HYPERLIPIDEMIA: ICD-10-CM

## 2025-07-10 DIAGNOSIS — C67.9 MALIGNANT NEOPLASM OF URINARY BLADDER, UNSPECIFIED SITE (H): ICD-10-CM

## 2025-07-10 DIAGNOSIS — G25.81 RESTLESS LEG SYNDROME: ICD-10-CM

## 2025-07-10 DIAGNOSIS — Z87.440 PERSONAL HISTORY OF URINARY TRACT INFECTION: ICD-10-CM

## 2025-07-10 DIAGNOSIS — R25.3 MUSCLE TWITCHING: ICD-10-CM

## 2025-07-10 DIAGNOSIS — N40.1 BENIGN LOCALIZED PROSTATIC HYPERPLASIA WITH LOWER URINARY TRACT SYMPTOMS (LUTS): ICD-10-CM

## 2025-07-10 NOTE — PROGRESS NOTES
HISTORY OF PRESENT ILLNESS:      Avinash is a 74 year old male (1950)  resident of [facility] who is being seen today for a routine visit.    Patient has a history of bladder cancer, COPD, alcohol dependence, recurrent falls.     Patient was residing at a hotel in [place] prior hospitalization.    Patient was admitted to this facility for rehabilitation following hospitalization at [facility].    Patient hospitalized 4/30-5/3 at [facility], presenting with recurrent falls. Found to be septic from UTI, pneumonia. He was initially treated with ceftriaxone and doxycycline, changed to unasyn. Urine culture with staph epi resistant to oxacillin and penicillin. He was started on levaquin. He was discharged to home, left AMA. He followed up in the clinic on 5/13 and was set up for admission at [facility].    Discussed with nursing staff who have the following concerns: None. Balance poor. Unclear plan moving forward.     Patient is seen in his room. He is an adequate historian. His biggest concern is [facility] and [facility]. Also mentions his balance. He is working with therapy. No recent falls.     He is smoking 3/4 pack of cigarettes per day. He is not drinking, states he only does that when he is bored. He tells me the plan is that he will go to [facility] or may move to [place].    Denies breathing concerns. He sleeps well. His weight is up over 20 lbs since being here. Notes he never used to have appetite when he was drinking but now eats his full meal.     He also mentions muscle twitching in his legs periodically and restless legs at night. We discussed repeat labs and mediation options. He will consider gabapentin, await lab results first.    Continues to self cath bid.      Current medications, allergies, and interdisciplinary care plan are reviewed.    Patient Active Problem List    Diagnosis Date Noted    Abnormal radiologic findings on diagnostic imaging of  renal pelvis, ureter, or bladder 05/21/2025     Priority: Medium    Harmful use of alcohol 05/21/2025     Priority: Medium    Muscle weakness (generalized) 05/21/2025     Priority: Medium    Need for assistance with personal care 05/21/2025     Priority: Medium    Personal history of urinary (tract) infections 05/21/2025     Priority: Medium    Problems related to living alone 05/21/2025     Priority: Medium    Syncope and collapse 05/21/2025     Priority: Medium    Generalized weakness 04/30/2025     Priority: Medium    Falls frequently 04/30/2025     Priority: Medium    Sepsis without acute organ dysfunction (H) 04/30/2025     Priority: Medium    Pneumonia 04/30/2025     Priority: Medium    Complicated UTI (urinary tract infection) 04/30/2025     Priority: Medium    Hypophosphatemia 03/25/2025     Priority: Medium    Acute cystitis with hematuria 03/22/2025     Priority: Medium    Hypokalemia 03/22/2025     Priority: Medium    Hypomagnesemia 03/21/2025     Priority: Medium    Severe sepsis with acute organ dysfunction (H) - hyperlactatemia due to UTI 03/21/2025     Priority: Medium    Chronic obstructive pulmonary disease, unspecified COPD type (H) 06/14/2024     Priority: Medium    Alcohol dependence with unspecified alcohol-induced disorder (H) 06/14/2024     Priority: Medium    Abnormal liver enzymes 06/14/2024     Priority: Medium    Asymptomatic varicose veins 06/14/2024     Priority: Medium    Low back pain 06/14/2024     Priority: Medium    Seborrheic dermatitis, unspecified 06/14/2024     Priority: Medium    Multiple pulmonary nodules 06/14/2024     Priority: Medium     Jan 07, 2019 Entered By: ISABELA CHAPPELL Comment: Lung nodule tracking begun 10/10/2017.      Osteoarthrosis 06/14/2024     Priority: Medium     Oct 16, 2012 Entered By: KHADIJAH BULLOCK Comment: Neck, shoulders, right wrist and bilateral hands      Other psoriasis 06/14/2024     Priority: Medium    Other retention of urine 06/14/2024      "Priority: Medium    Personal history of malignant neoplasm of bladder 06/14/2024     Priority: Medium    Mixed hyperlipidemia 06/14/2024     Priority: Medium    Convulsions, unspecified convulsion type (H) 06/13/2024     Priority: Medium    Benign localized prostatic hyperplasia with lower urinary tract symptoms (LUTS) 06/13/2024     Priority: Medium    Malignant neoplasm of urinary bladder (H) 03/09/2023     Priority: Medium    Restless leg 12/14/2021     Priority: Medium    Venous insufficiency 01/11/2014     Priority: Medium    Other viral warts 05/18/2007     Priority: Medium          Social History     Socioeconomic History    Marital status:      Spouse name: Not on file    Number of children: Not on file    Years of education: Not on file    Highest education level: Not on file   Occupational History    Not on file   Tobacco Use    Smoking status: Every Day     Current packs/day: 1.00     Average packs/day: 1 pack/day for 53.5 years (53.5 ttl pk-yrs)     Types: Cigarettes     Start date: 1972    Smokeless tobacco: Never   Vaping Use    Vaping status: Never Used   Substance and Sexual Activity    Alcohol use: Yes    Drug use: Not Currently     Comment: Former: \"POT and anything I could get my hands on.  I would stick things in my arms.\"    Sexual activity: Not Currently   Other Topics Concern    Not on file   Social History Narrative    Preloaded 1/31/2013.     Social Drivers of Health     Financial Resource Strain: Low Risk  (4/30/2025)    Financial Resource Strain     Within the past 12 months, have you or your family members you live with been unable to get utilities (heat, electricity) when it was really needed?: No   Food Insecurity: Low Risk  (4/30/2025)    Food Insecurity     Within the past 12 months, did you worry that your food would run out before you got money to buy more?: No     Within the past 12 months, did the food you bought just not last and you didn t have money to get more?: No "   Transportation Needs: High Risk (4/30/2025)    Transportation Needs     Within the past 12 months, has lack of transportation kept you from medical appointments, getting your medicines, non-medical meetings or appointments, work, or from getting things that you need?: Yes   Physical Activity: Unknown (6/13/2024)    Exercise Vital Sign     Days of Exercise per Week: 2 days     Minutes of Exercise per Session: Not on file   Stress: No Stress Concern Present (6/13/2024)    [employer] - Occupational Stress Questionnaire     Feeling of Stress : Not at all   Social Connections: Unknown (6/13/2024)    Social Connection and Isolation Panel [NHANES]     Frequency of Communication with Friends and Family: Not on file     Frequency of Social Gatherings with Friends and Family: Once a week     Attends L[employer]-day Services: Not on file     Active Member of Clubs or Organizations: Not on file     Attends Club or Organization Meetings: Not on file     Marital Status: Not on file   Interpersonal Safety: Low Risk  (4/30/2025)    Interpersonal Safety     Do you feel physically and emotionally safe where you currently live?: Yes     Within the past 12 months, have you been hit, slapped, kicked or otherwise physically hurt by someone?: No     Within the past 12 months, have you been humiliated or emotionally abused in other ways by your partner or ex-partner?: No   Housing Stability: High Risk (4/30/2025)    Housing Stability     Do you have housing? : Yes     Are you worried about losing your housing?: Yes        Current Outpatient Medications   Medication Sig Dispense Refill    albuterol (PROAIR HFA/PROVENTIL HFA/VENTOLIN HFA) 108 (90 Base) MCG/ACT inhaler Inhale 2 puffs into the lungs every 6 hours as needed.      atorvastatin (LIPITOR) 20 MG tablet Take 20 mg by mouth daily.      finasteride (PROSCAR) 5 MG tablet Take 5 mg by mouth daily.      omeprazole (PRILOSEC) 20 MG DR capsule Take 1 capsule (20  mg) by mouth daily 90 capsule 3    tamsulosin (FLOMAX) 0.4 MG capsule Take 0.8 mg by mouth daily.       No current facility-administered medications for this visit.       No Known Allergies    I have reviewed the care plan and do agree with the plan.    ROS:  No chest pain, shortness of breath, fever, chills, headache, nausea, vomiting, dysuria, or changes in bowel habits.  Appetite is good.  NO pain noted.    OBJECTIVE:  /62   Pulse 72   Temp 98.9  F (37.2  C)   Wt 73.5 kg (162 lb)   SpO2 95%   BMI 19.72 kg/m      GENERAL:  Alert, and in no acute distress  RESP:  Lungs clear.  No rales, rhonchi, or wheezing  CV:  RRR.  S1 S2 with NO murmur. No clicks or rubs.  ABD: Soft, non tender, non distended, no organomegaly. BS are normal.   SKIN:  Age-related changes.  No suspicious lesions or rashes.  PSYCH:  Affect is bright.  NEURO: Mental status is alert. Memory is intact.   EXTREM:  No edema.  Pulses palpable.    Lab/Diagnostic data:    Reviewed in Epic    ASSESSMENT/ORDERS:  Star was seen today for initial visit.    Diagnoses and all orders for this visit:    Personal history of urinary tract infection, complicated   Resolved. With hospitalization 4/30-5/4. Completed levaquin. History of urinary retention, self caths BID. No concerns today.     Pneumonia due to infectious organism, unspecified laterality, unspecified part of lung  Resolved. Breathing is stable with no signs of recurrence. Monitor.    Benign localized prostatic hyperplasia with lower urinary tract symptoms (LUTS)  Continue flomax and proscar. No urinary concerns today. Patient is self catheterizing 1-2 times daily.  He is scheduled for urology in October.     Alcohol dependence with unspecified alcohol-induced disorder (H)   working on discharge planning. [facility]. Patient notes the current plan is for him to move to [place] or to [facility] in [place].    Generalized weakness/Recurrent falls / Balance  concerns  Continue Pt/OT, appreciate recommendations. Discussed the potential role of the regular etoh use on balance. Suspect some neuropathy and cerebellar degeneration. THis is overall stable with no recent falls.    Mixed hyperlipidemia  Continue statin.    Hypomagnesemia  Remains low, most recently 1.1 on 5/13. Patient refuses supplementation. Suspect this may improve with less etoh. This is likely contributing to muscle twitching and leg symptoms. See below, plan for repeat labs.    Muscle twitching/Restless leg syndrome  Suspect this may be due to above. Will plan to repeat CMP, magnesium, iron studies on lab day at facility. Await results.    Total time spent with patient visit was 30 min including patient visit, review of pertinent clinical information, and treatment plan.    Shivani Qureshi, CNP

## 2025-07-16 ENCOUNTER — TRANSFERRED RECORDS (OUTPATIENT)
Dept: HEALTH INFORMATION MANAGEMENT | Facility: CLINIC | Age: 75
End: 2025-07-16
Payer: MEDICARE

## 2025-07-18 ENCOUNTER — TRANSFERRED RECORDS (OUTPATIENT)
Dept: HEALTH INFORMATION MANAGEMENT | Facility: CLINIC | Age: 75
End: 2025-07-18
Payer: MEDICARE

## 2025-07-19 ENCOUNTER — HEALTH MAINTENANCE LETTER (OUTPATIENT)
Age: 75
End: 2025-07-19

## 2025-07-30 ENCOUNTER — PATIENT OUTREACH (OUTPATIENT)
Dept: CARE COORDINATION | Facility: CLINIC | Age: 75
End: 2025-07-30
Payer: MEDICARE

## 2025-07-31 ENCOUNTER — HOSPITAL ENCOUNTER (OUTPATIENT)
Dept: CT IMAGING | Facility: HOSPITAL | Age: 75
End: 2025-07-31
Attending: NURSE PRACTITIONER
Payer: COMMERCIAL

## 2025-07-31 DIAGNOSIS — R91.1 LUNG NODULE: ICD-10-CM

## 2025-07-31 DIAGNOSIS — Z01.89 ENCOUNTER FOR OTHER SPECIFIED SPECIAL EXAMINATIONS: ICD-10-CM

## 2025-07-31 PROCEDURE — 71250 CT THORAX DX C-: CPT

## 2025-07-31 PROCEDURE — 71250 CT THORAX DX C-: CPT | Mod: 26 | Performed by: RADIOLOGY

## 2025-08-01 ENCOUNTER — TRANSFERRED RECORDS (OUTPATIENT)
Dept: HEALTH INFORMATION MANAGEMENT | Facility: CLINIC | Age: 75
End: 2025-08-01
Payer: MEDICARE

## 2025-08-03 ENCOUNTER — ANESTHESIA EVENT (OUTPATIENT)
Dept: SURGERY | Facility: HOSPITAL | Age: 75
End: 2025-08-03
Payer: COMMERCIAL

## 2025-08-03 ASSESSMENT — ENCOUNTER SYMPTOMS: SEIZURES: 1

## 2025-08-03 ASSESSMENT — COPD QUESTIONNAIRES: COPD: 1

## 2025-08-06 ENCOUNTER — TRANSFERRED RECORDS (OUTPATIENT)
Dept: HEALTH INFORMATION MANAGEMENT | Facility: CLINIC | Age: 75
End: 2025-08-06
Payer: MEDICARE

## 2025-08-06 ENCOUNTER — TELEPHONE (OUTPATIENT)
Dept: GERIATRICS | Facility: OTHER | Age: 75
End: 2025-08-06

## 2025-08-07 ENCOUNTER — HOSPITAL ENCOUNTER (OUTPATIENT)
Facility: HOSPITAL | Age: 75
Discharge: HOME | End: 2025-08-07
Attending: OPHTHALMOLOGY | Admitting: OPHTHALMOLOGY
Payer: COMMERCIAL

## 2025-08-07 ENCOUNTER — ANESTHESIA (OUTPATIENT)
Dept: SURGERY | Facility: HOSPITAL | Age: 75
End: 2025-08-07
Payer: COMMERCIAL

## 2025-08-07 ENCOUNTER — TRANSFERRED RECORDS (OUTPATIENT)
Dept: HEALTH INFORMATION MANAGEMENT | Facility: HOSPITAL | Age: 75
End: 2025-08-07

## 2025-08-07 VITALS
DIASTOLIC BLOOD PRESSURE: 95 MMHG | TEMPERATURE: 97.2 F | HEART RATE: 78 BPM | RESPIRATION RATE: 16 BRPM | HEIGHT: 76 IN | OXYGEN SATURATION: 95 % | WEIGHT: 162 LBS | BODY MASS INDEX: 19.73 KG/M2 | SYSTOLIC BLOOD PRESSURE: 126 MMHG

## 2025-08-07 PROCEDURE — 272N000001 HC OR GENERAL SUPPLY STERILE: Performed by: OPHTHALMOLOGY

## 2025-08-07 PROCEDURE — 370N000017 HC ANESTHESIA TECHNICAL FEE, PER MIN: Performed by: OPHTHALMOLOGY

## 2025-08-07 PROCEDURE — 710N000012 HC RECOVERY PHASE 2, PER MINUTE: Performed by: OPHTHALMOLOGY

## 2025-08-07 PROCEDURE — 999N000141 HC STATISTIC PRE-PROCEDURE NURSING ASSESSMENT: Performed by: OPHTHALMOLOGY

## 2025-08-07 PROCEDURE — 250N000009 HC RX 250: Performed by: OPHTHALMOLOGY

## 2025-08-07 PROCEDURE — 360N000076 HC SURGERY LEVEL 3, PER MIN: Performed by: OPHTHALMOLOGY

## 2025-08-07 PROCEDURE — V2787 ASTIGMATISM-CORRECT FUNCTION: HCPCS | Performed by: OPHTHALMOLOGY

## 2025-08-07 PROCEDURE — 250N000011 HC RX IP 250 OP 636: Performed by: OPHTHALMOLOGY

## 2025-08-07 DEVICE — IMPLANTABLE DEVICE: Type: IMPLANTABLE DEVICE | Site: EYE | Status: FUNCTIONAL

## 2025-08-07 RX ORDER — CYCLOPENTOLATE HYDROCHLORIDE 20 MG/ML
1 SOLUTION/ DROPS OPHTHALMIC ONCE
Status: COMPLETED | OUTPATIENT
Start: 2025-08-07 | End: 2025-08-07

## 2025-08-07 RX ORDER — CYCLOPENTOLAT/TROPIC/PHENYLEPH 1%-1%-2.5%
2 DROPS (EA) OPHTHALMIC (EYE)
Status: COMPLETED | OUTPATIENT
Start: 2025-08-07 | End: 2025-08-07

## 2025-08-07 RX ORDER — ACETAMINOPHEN 325 MG/1
650 TABLET ORAL
Status: COMPLETED | OUTPATIENT
Start: 2025-08-07 | End: 2025-08-07

## 2025-08-07 RX ORDER — LIDOCAINE HYDROCHLORIDE 10 MG/ML
INJECTION, SOLUTION EPIDURAL; INFILTRATION; INTRACAUDAL; PERINEURAL PRN
Status: DISCONTINUED | OUTPATIENT
Start: 2025-08-07 | End: 2025-08-07 | Stop reason: HOSPADM

## 2025-08-07 RX ORDER — MOXIFLOXACIN IN NACL,ISO-OS/PF 1.6 MG/ML
SYRINGE (ML) INTRAOCULAR PRN
Status: DISCONTINUED | OUTPATIENT
Start: 2025-08-07 | End: 2025-08-07 | Stop reason: HOSPADM

## 2025-08-07 RX ORDER — PROPARACAINE HYDROCHLORIDE 5 MG/ML
1 SOLUTION/ DROPS OPHTHALMIC
Status: COMPLETED | OUTPATIENT
Start: 2025-08-07 | End: 2025-08-07

## 2025-08-07 RX ORDER — NALOXONE HYDROCHLORIDE 0.4 MG/ML
0.1 INJECTION, SOLUTION INTRAMUSCULAR; INTRAVENOUS; SUBCUTANEOUS
Status: DISCONTINUED | OUTPATIENT
Start: 2025-08-07 | End: 2025-08-07 | Stop reason: HOSPADM

## 2025-08-07 RX ORDER — ONDANSETRON 4 MG/1
4 TABLET, ORALLY DISINTEGRATING ORAL EVERY 30 MIN PRN
Status: DISCONTINUED | OUTPATIENT
Start: 2025-08-07 | End: 2025-08-07 | Stop reason: HOSPADM

## 2025-08-07 RX ORDER — TETRACAINE HYDROCHLORIDE 5 MG/ML
SOLUTION OPHTHALMIC PRN
Status: DISCONTINUED | OUTPATIENT
Start: 2025-08-07 | End: 2025-08-07 | Stop reason: HOSPADM

## 2025-08-07 RX ORDER — PHENYLEPHRINE HYDROCHLORIDE 100 MG/ML
1 SOLUTION/ DROPS OPHTHALMIC ONCE
Status: COMPLETED | OUTPATIENT
Start: 2025-08-07 | End: 2025-08-07

## 2025-08-07 RX ORDER — ONDANSETRON 2 MG/ML
4 INJECTION INTRAMUSCULAR; INTRAVENOUS EVERY 30 MIN PRN
Status: DISCONTINUED | OUTPATIENT
Start: 2025-08-07 | End: 2025-08-07 | Stop reason: HOSPADM

## 2025-08-07 RX ORDER — PROPARACAINE HYDROCHLORIDE 5 MG/ML
1 SOLUTION/ DROPS OPHTHALMIC ONCE
Status: COMPLETED | OUTPATIENT
Start: 2025-08-07 | End: 2025-08-07

## 2025-08-07 RX ORDER — SODIUM CHLORIDE, SODIUM LACTATE, POTASSIUM CHLORIDE, CALCIUM CHLORIDE 600; 310; 30; 20 MG/100ML; MG/100ML; MG/100ML; MG/100ML
INJECTION, SOLUTION INTRAVENOUS CONTINUOUS
Status: DISCONTINUED | OUTPATIENT
Start: 2025-08-07 | End: 2025-08-07 | Stop reason: HOSPADM

## 2025-08-07 RX ORDER — LIDOCAINE HYDROCHLORIDE 20 MG/ML
JELLY TOPICAL ONCE
Status: COMPLETED | OUTPATIENT
Start: 2025-08-07 | End: 2025-08-07

## 2025-08-07 RX ORDER — PILOCARPINE HYDROCHLORIDE 10 MG/ML
SOLUTION/ DROPS OPHTHALMIC PRN
Status: DISCONTINUED | OUTPATIENT
Start: 2025-08-07 | End: 2025-08-07 | Stop reason: HOSPADM

## 2025-08-07 RX ORDER — PHENYLEPHRINE HYDROCHLORIDE 100 MG/ML
1 SOLUTION/ DROPS OPHTHALMIC
Status: COMPLETED | OUTPATIENT
Start: 2025-08-07 | End: 2025-08-07

## 2025-08-07 RX ORDER — MAGNESIUM GLYCINATE 100 MG
200 CAPSULE ORAL AT BEDTIME
COMMUNITY
Start: 2025-08-07

## 2025-08-07 RX ORDER — DEXAMETHASONE SODIUM PHOSPHATE 10 MG/ML
4 INJECTION, SOLUTION INTRAMUSCULAR; INTRAVENOUS
Status: DISCONTINUED | OUTPATIENT
Start: 2025-08-07 | End: 2025-08-07 | Stop reason: HOSPADM

## 2025-08-07 RX ORDER — LIDOCAINE 40 MG/G
CREAM TOPICAL
Status: DISCONTINUED | OUTPATIENT
Start: 2025-08-07 | End: 2025-08-07 | Stop reason: HOSPADM

## 2025-08-07 RX ORDER — LEVOBUNOLOL HYDROCHLORIDE 5 MG/ML
SOLUTION/ DROPS OPHTHALMIC PRN
Status: DISCONTINUED | OUTPATIENT
Start: 2025-08-07 | End: 2025-08-07 | Stop reason: HOSPADM

## 2025-08-07 RX ADMIN — PHENYLEPHRINE HYDROCHLORIDE 1 DROP: 100 SOLUTION/ DROPS OPHTHALMIC at 07:53

## 2025-08-07 RX ADMIN — LIDOCAINE HYDROCHLORIDE: 20 JELLY TOPICAL at 08:16

## 2025-08-07 RX ADMIN — Medication 2 DROP: at 07:58

## 2025-08-07 RX ADMIN — Medication 2 DROP: at 08:07

## 2025-08-07 RX ADMIN — PROPARACAINE HYDROCHLORIDE 1 DROP: 5 SOLUTION/ DROPS OPHTHALMIC at 07:46

## 2025-08-07 RX ADMIN — PHENYLEPHRINE HYDROCHLORIDE 1 DROP: 100 SOLUTION/ DROPS OPHTHALMIC at 08:12

## 2025-08-07 RX ADMIN — PROPARACAINE HYDROCHLORIDE 1 DROP: 5 SOLUTION/ DROPS OPHTHALMIC at 08:03

## 2025-08-07 RX ADMIN — CYCLOPENTOLATE HYDROCHLORIDE 1 DROP: 20 SOLUTION/ DROPS OPHTHALMIC at 07:50

## 2025-08-07 ASSESSMENT — ACTIVITIES OF DAILY LIVING (ADL)
ADLS_ACUITY_SCORE: 40
ADLS_ACUITY_SCORE: 40
ADLS_ACUITY_SCORE: 41

## 2025-08-07 ASSESSMENT — LIFESTYLE VARIABLES: TOBACCO_USE: 1

## 2025-08-11 VITALS
RESPIRATION RATE: 20 BRPM | BODY MASS INDEX: 20.36 KG/M2 | OXYGEN SATURATION: 95 % | HEIGHT: 76 IN | WEIGHT: 167.2 LBS | SYSTOLIC BLOOD PRESSURE: 123 MMHG | DIASTOLIC BLOOD PRESSURE: 62 MMHG | HEART RATE: 72 BPM | TEMPERATURE: 98.9 F

## 2025-08-11 RX ORDER — KETOROLAC TROMETHAMINE 5 MG/ML
1 SOLUTION OPHTHALMIC 4 TIMES DAILY
COMMUNITY

## 2025-08-11 RX ORDER — PREDNISOLONE ACETATE 10 MG/ML
1 SUSPENSION/ DROPS OPHTHALMIC 4 TIMES DAILY
COMMUNITY

## 2025-08-14 ENCOUNTER — NURSING HOME VISIT (OUTPATIENT)
Dept: GERIATRICS | Facility: OTHER | Age: 75
End: 2025-08-14

## 2025-08-14 DIAGNOSIS — Z87.440 PERSONAL HISTORY OF URINARY TRACT INFECTION: ICD-10-CM

## 2025-08-14 DIAGNOSIS — N40.1 BENIGN LOCALIZED PROSTATIC HYPERPLASIA WITH LOWER URINARY TRACT SYMPTOMS (LUTS): ICD-10-CM

## 2025-08-14 DIAGNOSIS — Z78.9 NURSING HOME RESIDENT: Primary | ICD-10-CM

## 2025-08-14 DIAGNOSIS — R93.89 ABNORMAL CHEST CT: ICD-10-CM

## (undated) DEVICE — SYR 50ML LL W/O NDL 309653

## (undated) DEVICE — PACK PHACO STELLARIS VAC 1 AUX DRP 1 NDL WRNCH BL5110

## (undated) DEVICE — CANNULA IRR 7/8IN 25GA VSTC VSCFL 45D 9MM DISP 585045

## (undated) DEVICE — GLOVE 7.5 PROTEXIS PI CLASSIC 2D72PL75X

## (undated) DEVICE — GLOVE PROTEXIS POWDER FREE 7.0 ORTHO LIME 2D73ET70

## (undated) DEVICE — SET HANDPIECE IRRIG/ASPIRATION 2.2-2.8X5.4" 45DEG TIP 85910S

## (undated) DEVICE — ENDO FORCEP ENDOJAW BIOPSY 2.8MMX230CM FB-220U

## (undated) DEVICE — CANNULA IRR 7/8IN 30GA VSTC VSCFL 45D 9MM DISP 585046

## (undated) DEVICE — EYE PREP BETADINE 5% SOLUTION 30ML 0065-0411-30

## (undated) DEVICE — ENDO BITE BLOCK 60 MAXI LF 00712804

## (undated) DEVICE — EYE KNIFE SLIT XSTAR VISITEC 2.8MM 45DEG 373728

## (undated) DEVICE — INJECTOR PORT FOR IOL LENSES SOFPORT EZ-28

## (undated) DEVICE — BIN-CATARACT BIN BN37

## (undated) DEVICE — BIN-LENS IMPLANT CART

## (undated) DEVICE — Device

## (undated) DEVICE — BIN-TECNIS DCB00 LENSES

## (undated) DEVICE — EYE KNIFE MICRO 15DEG BEVEL 377516

## (undated) DEVICE — INSTRUMENT WIPE VISIWIPE 581047

## (undated) DEVICE — EYE CANN IRR 25GA HYDRODISSECTING 585037

## (undated) DEVICE — SOL WATER 1500ML

## (undated) DEVICE — TUBING SUCTION 10'X3/16" N510

## (undated) DEVICE — ENDO TRAP POLYP E-TRAP 00711099

## (undated) DEVICE — ENDO KIT COMPLIANCE DYKENDOCMPLY

## (undated) DEVICE — EYE CANN IRR 25GA CYSTOTOME 581610

## (undated) DEVICE — ENDO BRUSH CHANNEL MASTER CLEANING 2-4.2MM BW-412T

## (undated) DEVICE — SUCTION MANIFOLD NEPTUNE 2 SYS 4 PORT 0702-020-000

## (undated) DEVICE — ENDO SNARE EXACTO COLD 9MM LOOP 2.4MMX230CM 00711115

## (undated) DEVICE — PACK EYE CUSTOM SEY32EPMBO

## (undated) RX ORDER — DOXYCYCLINE 100 MG/1
CAPSULE ORAL
Status: DISPENSED
Start: 2025-04-30

## (undated) RX ORDER — LIDOCAINE HYDROCHLORIDE 20 MG/ML
INJECTION, SOLUTION EPIDURAL; INFILTRATION; INTRACAUDAL; PERINEURAL
Status: DISPENSED
Start: 2020-11-12

## (undated) RX ORDER — PROPOFOL 10 MG/ML
INJECTION, EMULSION INTRAVENOUS
Status: DISPENSED
Start: 2020-11-12

## (undated) RX ORDER — SULFAMETHOXAZOLE/TRIMETHOPRIM 800-160 MG
TABLET ORAL
Status: DISPENSED
Start: 2020-09-28

## (undated) RX ORDER — CEFTRIAXONE SODIUM 1 G
VIAL (EA) INJECTION
Status: DISPENSED
Start: 2025-03-21

## (undated) RX ORDER — SODIUM CHLORIDE, SODIUM LACTATE, POTASSIUM CHLORIDE, CALCIUM CHLORIDE 600; 310; 30; 20 MG/100ML; MG/100ML; MG/100ML; MG/100ML
INJECTION, SOLUTION INTRAVENOUS
Status: DISPENSED
Start: 2020-11-12

## (undated) RX ORDER — MAGNESIUM SULFATE HEPTAHYDRATE 40 MG/ML
INJECTION, SOLUTION INTRAVENOUS
Status: DISPENSED
Start: 2025-03-21

## (undated) RX ORDER — SODIUM CHLORIDE 9 MG/ML
INJECTION, SOLUTION INTRAVENOUS
Status: DISPENSED
Start: 2020-09-28

## (undated) RX ORDER — MAGNESIUM SULFATE HEPTAHYDRATE 40 MG/ML
INJECTION, SOLUTION INTRAVENOUS
Status: DISPENSED
Start: 2025-04-30

## (undated) RX ORDER — ONDANSETRON 2 MG/ML
INJECTION INTRAMUSCULAR; INTRAVENOUS
Status: DISPENSED
Start: 2021-12-12

## (undated) RX ORDER — IBUPROFEN 400 MG/1
TABLET, FILM COATED ORAL
Status: DISPENSED
Start: 2021-12-12

## (undated) RX ORDER — CEFTRIAXONE SODIUM 1 G
VIAL (EA) INJECTION
Status: DISPENSED
Start: 2025-04-30

## (undated) RX ORDER — GINSENG 100 MG
CAPSULE ORAL
Status: DISPENSED
Start: 2024-07-03

## (undated) RX ORDER — LIDOCAINE HYDROCHLORIDE 20 MG/ML
JELLY TOPICAL
Status: DISPENSED
Start: 2025-04-30

## (undated) RX ORDER — FLUCONAZOLE 150 MG/1
TABLET ORAL
Status: DISPENSED
Start: 2020-09-28